# Patient Record
Sex: FEMALE | Race: WHITE | NOT HISPANIC OR LATINO | Employment: OTHER | URBAN - METROPOLITAN AREA
[De-identification: names, ages, dates, MRNs, and addresses within clinical notes are randomized per-mention and may not be internally consistent; named-entity substitution may affect disease eponyms.]

---

## 2017-01-04 ENCOUNTER — OFFICE VISIT (OUTPATIENT)
Dept: NEUROSURGERY | Facility: CLINIC | Age: 72
End: 2017-01-04

## 2017-01-04 VITALS
DIASTOLIC BLOOD PRESSURE: 60 MMHG | WEIGHT: 140 LBS | BODY MASS INDEX: 25.76 KG/M2 | HEIGHT: 62 IN | TEMPERATURE: 97.3 F | SYSTOLIC BLOOD PRESSURE: 144 MMHG

## 2017-01-04 DIAGNOSIS — M51.16 HERNIATION OF LUMBAR INTERVERTEBRAL DISC WITH RADICULOPATHY: Primary | ICD-10-CM

## 2017-01-04 PROCEDURE — 99024 POSTOP FOLLOW-UP VISIT: CPT | Performed by: PHYSICIAN ASSISTANT

## 2017-01-04 RX ORDER — ETODOLAC 600 MG/1
TABLET, EXTENDED RELEASE ORAL
Refills: 2 | COMMUNITY
Start: 2016-12-22 | End: 2017-07-08 | Stop reason: HOSPADM

## 2017-01-04 NOTE — PROGRESS NOTES
Patient: Paulina Johnson  : 1945  Chart #: [id number]    Date of Service: 2017    CHIEF COMPLAINT: right L4 5 disc herniation with radiculopathy and spinal stenosis    History of Present Illness Ms. Johnson is a kind 71-year-old female with a protracted history of severe back and predominantly right leg pain.  She had substantial walking and standing intolerance.  Her studies revealed a huge disc herniation at L4 5 with marked compromise of the spinal canal.  Given her ongoing symptoms, on 2016 she underwent right L4 5 laminotomy with medial facetectomy, foraminotomy and L4 5 discectomy.  Surgery was without overt intraoperative complication.    Today Ms. Johnson is 3 weeks postop.  She continues to have intermittent pain in the right leg.  She is more uncomfortable at nighttime.  She is using Aleve for pain control.  The severe pain she had prior to surgery is absent.  She has had no incisional difficulties.  She denies left-sided symptoms.    The following portions of the patient's history were reviewed and updated as appropriate: allergies, current medications, past family history, past medical history, past social history, past surgical history and problem list.    Review of Systems   Constitutional: Negative for activity change, appetite change, chills, diaphoresis, fatigue, fever and unexpected weight change.   HENT: Negative for congestion, dental problem, drooling, ear discharge, ear pain, facial swelling, hearing loss, mouth sores, nosebleeds, postnasal drip, rhinorrhea, sinus pressure, sneezing, sore throat, tinnitus, trouble swallowing and voice change.    Eyes: Negative for photophobia, pain, discharge, redness, itching and visual disturbance.   Respiratory: Negative for apnea, cough, choking, chest tightness, shortness of breath, wheezing and stridor.    Cardiovascular: Negative for chest pain, palpitations and leg swelling.   Gastrointestinal: Negative for abdominal distention,  "abdominal pain, anal bleeding, blood in stool, constipation, diarrhea, nausea, rectal pain and vomiting.   Endocrine: Negative for cold intolerance, heat intolerance, polydipsia, polyphagia and polyuria.   Genitourinary: Negative for decreased urine volume, difficulty urinating, dysuria, enuresis, flank pain, frequency, genital sores, hematuria and urgency.   Musculoskeletal: Positive for arthralgias, back pain, gait problem and myalgias. Negative for joint swelling, neck pain and neck stiffness.   Skin: Negative for color change, pallor, rash and wound.   Allergic/Immunologic: Negative for environmental allergies, food allergies and immunocompromised state.   Neurological: Positive for numbness. Negative for dizziness, tremors, seizures, syncope, facial asymmetry, speech difficulty, weakness, light-headedness and headaches.        Patient reports falling several times recently   Hematological: Negative for adenopathy. Does not bruise/bleed easily.   Psychiatric/Behavioral: Negative for agitation, behavioral problems, confusion, decreased concentration, dysphoric mood, hallucinations, self-injury, sleep disturbance and suicidal ideas. The patient is not nervous/anxious and is not hyperactive.        Objective   Vital Signs: Blood pressure 144/60, temperature 97.3 °F (36.3 °C), height 62\" (157.5 cm), weight 140 lb (63.5 kg).  Physical Exam   Constitutional: She appears well-developed and well-nourished.   Skin:   Examination of lumbar incision reveals incision to be intact.  There is some scabbing along the length of the incision.   No warmth erythema or induration. No swelling    Patient is ambulating more upright and without pain.   Nursing note and vitals reviewed.    Assessment/Plan   Medical Decision Making: Ms. Johnson is 3 weeks status post decompressive lumbar surgery and is making good progress.  She continues to have some mild, intermittant pain which I would expect to improve with some more time.  Her " incision is healing nicely and I discussed further wound care instructions.  She will be leaving for [place] next week and will not return until the weather is warm again in [place].  I encouraged her to get up and walk around frequently during her flight.  She will call us if she has any further issues.               Malika Fernando PA-C  Patient Care Team:  Hernan Thompson MD as PCP - General (Internal Medicine)  Hernan Thompson MD as Consulting Physician (Internal Medicine)

## 2017-01-04 NOTE — MR AVS SNAPSHOT
Paulina Johnson   1/4/2017 12:30 PM   Office Visit    Dept Phone:  [phone]   Encounter #:  [id number]    Provider:  Malika Fernando PA-C   Department:  [facility]                Your Full Care Plan              Your Updated Medication List          This list is accurate as of: 1/4/17  1:02 PM.  Always use your most recent med list.                aspirin 81 MG EC tablet       clopidogrel 75 MG tablet   Commonly known as:  PLAVIX   Take 1 tablet by mouth Daily. Resume in 1 week.       DULoxetine 60 MG capsule   Commonly known as:  CYMBALTA       etodolac  MG 24 hr tablet   Commonly known as:  LODINE XL       furosemide 40 MG tablet   Commonly known as:  LASIX       insulin aspart 100 UNIT/ML injection   Commonly known as:  novoLOG       insulin detemir 100 UNIT/ML injection   Commonly known as:  LEVEMIR       levothyroxine 50 MCG tablet   Commonly known as:  SYNTHROID, LEVOTHROID       metFORMIN 850 MG tablet   Commonly known as:  GLUCOPHAGE       oxyCODONE-acetaminophen 5-325 MG per tablet   Commonly known as:  PERCOCET   Take 1 tablet by mouth 3 (Three) Times a Day As Needed (as needed for moderate pain).       raloxifene 60 MG tablet   Commonly known as:  EVISTA       sertraline 50 MG tablet   Commonly known as:  ZOLOFT       simvastatin 20 MG tablet   Commonly known as:  ZOCOR       valsartan 160 MG tablet   Commonly known as:  DIOVAN               You Were Diagnosed With        Codes Comments    Herniation of lumbar intervertebral disc with radiculopathy    -  Primary ICD-10-CM: M51.16  ICD-9-CM: 722.10       Instructions     None    Patient Instructions History      Upcoming Appointments     Visit Type Date Time Department    POST-OP 1/4/2017 12:30 PM [facility]      MyChart Signup     [facility] allows you to send messages to your doctor, view your test results, renew your prescriptions, schedule  "appointments, and more. To sign up, go to [email or web address] and click on the Sign Up Now link in the New User? box. Enter your BTIG Activation Code exactly as it appears below along with the last four digits of your Social Security Number and your Date of Birth () to complete the sign-up process. If you do not sign up before the expiration date, you must request a new code.    BTIG Activation Code: [email or web address]  Expires: 2017  1:02 PM    If you have questions, you can email [email or web address] or call [phone] to talk to our BTIG staff. Remember, BTIG is NOT to be used for urgent needs. For medical emergencies, dial 911.               Other Info from Your Visit           Allergies     Azithromycin  Other (See Comments)    unsure    Ciprofloxacin  Other (See Comments)    unsure    Daypro [Oxaprozin]  Other (See Comments)    unsure    Lortab [Hydrocodone-acetaminophen]  Other (See Comments)    unsure    Penicillins  Other (See Comments)    unsure      Reason for Visit     Post-op           Vital Signs     Blood Pressure Temperature Height Weight Body Mass Index Smoking Status    144/60 97.3 °F (36.3 °C) 62\" (157.5 cm) 140 lb (63.5 kg) 25.61 kg/m2 Never Smoker      Problems and Diagnoses Noted     Herniation of lumbar intervertebral disc with radiculopathy    -  Primary        "

## 2017-04-07 ENCOUNTER — TELEPHONE (OUTPATIENT)
Dept: NEUROSURGERY | Facility: CLINIC | Age: 72
End: 2017-04-07

## 2017-04-07 RX ORDER — METHYLPREDNISOLONE 4 MG/1
TABLET ORAL
Qty: 1 EACH | Refills: 0 | Status: SHIPPED | OUTPATIENT
Start: 2017-04-07 | End: 2017-04-23

## 2017-04-07 NOTE — TELEPHONE ENCOUNTER
Provider:  Isra  Caller: patient  Time of call:  11:20   Phone #:  [phone]  Surgery:  Right lumbar discectomy  Surgery Date:  12-14-16  Last visit:   01/04/17  Next visit: None    BILL:         Reason for call:    Patient states that she has right hip pain that runs down her right lower extremity into the right foot.      When did it start: It has been going on all week.      Where is it located:right hip, right lower extremity and right foot.    How long has this been going on/is this new or the same as before surgery: She said it feels the same as before surgery.    Characteristics of symptom/severity: She describes the pain as stabbing.  She said she has to use her walker again.     Timing- Is it constant or intermittent: constant    What makes it worse: Being ambulatory.    What makes it better: Yanely knocks the edge off.    What therapies/medications have you tried: Aleve.  I told her to use some ice on her hip.

## 2017-04-07 NOTE — TELEPHONE ENCOUNTER
Called patient to discuss c/o right back pain x 5 days s/p Right L4/5 discectomy in Dec 2016.  States she was pain free for at least 3 weeks before resuming normal effort of housework and laundry.  She started having back pain on Sunday starting in her lower back and hip and radiating down the back of her leg to the bottom of the foot.  The pain is improved with aleve, laying down, and propping her leg on a pillow.  Walking makes the pain worse.  No falls since her surgery.  No apparent acute injuries related to the onset of her pain.      Recommended Ms. Johnson to take it easy this weekend and try to do as little housework as possible.  Advised her to avoid doing laundry over the weekend.  Advised her to monitor her sugars closely since will eRx Medro dosepak for the inflammation.  Advised her to hold the Aleve until after finishing the Medro dosepak.  Recommended ice packs for 20m q2h while awake.  Advised her to call on Monday if pain is not improved and we will schedule an appointment for her to be seen in clinic.

## 2017-04-10 ENCOUNTER — TELEPHONE (OUTPATIENT)
Dept: NEUROSURGERY | Facility: CLINIC | Age: 72
End: 2017-04-10

## 2017-04-10 NOTE — TELEPHONE ENCOUNTER
Called patient to f/up back pain over the weekend.  Last week she reported a progressive improvement of her pain s/p L4/5 discectomy in December. She most recently had a 3 week pain-free interval before developing sx of right back and hip pain after resuming normal effort of housework.  She reported the pain going from right back and hip going down back of leg to her foot.  The pain is improved with laying down and Aleve, provoked with walking.  Recommended ice therapy, rest, and a medro dosepak over the weekend.  I called her to see how her pain is today and she said she is still in pain.  I called Magdalena and she is calling patient to set up an appointment to see Dr. Dhaliwal tomorrow at 3:45.     KAH

## 2017-04-11 ENCOUNTER — OFFICE VISIT (OUTPATIENT)
Dept: NEUROSURGERY | Facility: CLINIC | Age: 72
End: 2017-04-11

## 2017-04-11 VITALS — BODY MASS INDEX: 25.03 KG/M2 | WEIGHT: 136 LBS | TEMPERATURE: 97.5 F | HEIGHT: 62 IN

## 2017-04-11 DIAGNOSIS — M51.16 HERNIATION OF LUMBAR INTERVERTEBRAL DISC WITH RADICULOPATHY: Primary | ICD-10-CM

## 2017-04-11 DIAGNOSIS — M48.062 LUMBAR STENOSIS WITH NEUROGENIC CLAUDICATION: ICD-10-CM

## 2017-04-11 PROCEDURE — 99213 OFFICE O/P EST LOW 20 MIN: CPT | Performed by: NEUROLOGICAL SURGERY

## 2017-04-11 RX ORDER — OXYCODONE HYDROCHLORIDE AND ACETAMINOPHEN 5; 325 MG/1; MG/1
1 TABLET ORAL 3 TIMES DAILY PRN
Qty: 50 TABLET | Refills: 0 | Status: ON HOLD | OUTPATIENT
Start: 2017-04-11 | End: 2017-04-25

## 2017-04-11 NOTE — PROGRESS NOTES
Patient: Paulina Johnson  : 1945    Primary Care Provider: Hernan Thompson MD    Requesting Provider: As above        History    Chief Complaint: Back and right leg pain.    History of Present Illness: Mr. Johnson is a 71-year-old woman who is well known to my service.  She presented with severe back and predominantly right leg pain.  She had significant walking and standing intolerance.  Her studies had revealed a very large disc herniation at L4-5 with marked compromise of the spinal canal.  On 2016 she underwent right L4-5 discectomy.  She was seen on one occasion in early January and was doing much better.  She went to [place] and was doing nicely.  She returned home about 2-1/2 weeks ago and then awoke a morning or 2 later with severe pain in her back right buttock.  The pain sometimes will extend all the way down to the foot.  She has no left-sided symptoms.  She denies bowel or bladder dysfunction.    Review of Systems   Constitutional: Negative for activity change, appetite change, chills, diaphoresis, fatigue, fever and unexpected weight change.   HENT: Negative for congestion, dental problem, drooling, ear discharge, ear pain, facial swelling, hearing loss, mouth sores, nosebleeds, postnasal drip, rhinorrhea, sinus pressure, sneezing, sore throat, tinnitus, trouble swallowing and voice change.    Eyes: Negative for photophobia, pain, discharge, redness, itching and visual disturbance.   Respiratory: Negative for apnea, cough, choking, chest tightness, shortness of breath, wheezing and stridor.    Cardiovascular: Negative for chest pain, palpitations and leg swelling.   Gastrointestinal: Negative for abdominal distention, abdominal pain, anal bleeding, blood in stool, constipation, diarrhea, nausea, rectal pain and vomiting.   Endocrine: Negative for cold intolerance, heat intolerance, polydipsia, polyphagia and polyuria.   Genitourinary: Negative for decreased urine volume,  "difficulty urinating, dysuria, enuresis, flank pain, frequency, genital sores, hematuria and urgency.   Musculoskeletal: Positive for back pain. Negative for arthralgias, gait problem, joint swelling, myalgias, neck pain and neck stiffness.   Skin: Negative for color change, pallor, rash and wound.   Allergic/Immunologic: Negative for environmental allergies, food allergies and immunocompromised state.   Neurological: Negative for dizziness, tremors, seizures, syncope, facial asymmetry, speech difficulty, weakness, light-headedness, numbness and headaches.   Hematological: Negative for adenopathy. Does not bruise/bleed easily.   Psychiatric/Behavioral: Negative for agitation, behavioral problems, confusion, decreased concentration, dysphoric mood, hallucinations, self-injury, sleep disturbance and suicidal ideas. The patient is not nervous/anxious and is not hyperactive.        The patient's past medical history, past surgical history, family history, and social history have been reviewed at length in the electronic medical record.    Physical Exam:   Temp 97.5 °F (36.4 °C)  Ht 62\" (157.5 cm)  Wt 136 lb (61.7 kg)  BMI 24.87 kg/m2  MUSCULOSKELETAL:  Straight leg raising is negative.  Edgar's Sign is negative.  ROM in back normal.  Tenderness in the back to palpation is not observed.  NEUROLOGICAL:  Strength is intact in the lower extremities to direct testing.  Muscle tone is normal throughout.  Station and gait are normal.  Sensation is intact to light touch testing throughout.  Deep tendon reflexes are 1+ and symmetrical.  Coordination is intact.      Medical Decision Making    Diagnosis:   Patient complaints the patient may harbor a recurrent radiculopathy.    Treatment Options:   I prescribed Percocet for pain.  I referred her for an MRI of the lumbar spine with and without gadolinium.  She will follow up thereafter.       Diagnosis Plan   1. Herniation of lumbar intervertebral disc with radiculopathy  " oxyCODONE-acetaminophen (PERCOCET) 5-325 MG per tablet   2. Lumbar stenosis with neurogenic claudication         Scribed for Vick Dhaliwal MD by Faith Garza CMA on 04/11/2017 at 4:45 PM    I, Dr. Dhaliwal, personally performed the services described in the documentation, as scribed in my presence, and it is both accurate and complete.

## 2017-04-19 ENCOUNTER — TELEPHONE (OUTPATIENT)
Dept: NEUROSURGERY | Facility: CLINIC | Age: 72
End: 2017-04-19

## 2017-04-19 ENCOUNTER — OFFICE VISIT (OUTPATIENT)
Dept: NEUROSURGERY | Facility: CLINIC | Age: 72
End: 2017-04-19

## 2017-04-19 ENCOUNTER — PREP FOR SURGERY (OUTPATIENT)
Dept: NEUROSURGERY | Facility: CLINIC | Age: 72
End: 2017-04-19

## 2017-04-19 ENCOUNTER — HOSPITAL ENCOUNTER (OUTPATIENT)
Dept: MRI IMAGING | Facility: HOSPITAL | Age: 72
Discharge: HOME | End: 2017-04-19
Attending: NEUROLOGICAL SURGERY | Admitting: NEUROLOGICAL SURGERY

## 2017-04-19 VITALS — HEIGHT: 62 IN | TEMPERATURE: 98.4 F | WEIGHT: 138 LBS | BODY MASS INDEX: 25.4 KG/M2

## 2017-04-19 DIAGNOSIS — M48.062 LUMBAR STENOSIS WITH NEUROGENIC CLAUDICATION: ICD-10-CM

## 2017-04-19 DIAGNOSIS — M51.26 RECURRENT HERNIATION OF LUMBAR DISC: Primary | ICD-10-CM

## 2017-04-19 DIAGNOSIS — M51.16 HERNIATION OF LUMBAR INTERVERTEBRAL DISC WITH RADICULOPATHY: ICD-10-CM

## 2017-04-19 DIAGNOSIS — M51.26 LUMBAR DISC HERNIATION: ICD-10-CM

## 2017-04-19 DIAGNOSIS — M51.26 RECURRENT DISPLACEMENT OF LUMBAR DISC: Primary | ICD-10-CM

## 2017-04-19 PROCEDURE — 99214 OFFICE O/P EST MOD 30 MIN: CPT | Performed by: NEUROLOGICAL SURGERY

## 2017-04-19 PROCEDURE — 72148 MRI LUMBAR SPINE W/O DYE: CPT

## 2017-04-19 RX ORDER — CHLORHEXIDINE GLUCONATE 4 G/100ML
SOLUTION TOPICAL
Qty: 120 ML | Refills: 0 | Status: SHIPPED | OUTPATIENT
Start: 2017-04-19 | End: 2017-04-25 | Stop reason: HOSPADM

## 2017-04-19 RX ORDER — SODIUM CHLORIDE 0.9 % (FLUSH) 0.9 %
1-10 SYRINGE (ML) INJECTION AS NEEDED
Status: CANCELLED | OUTPATIENT
Start: 2017-04-19

## 2017-04-19 RX ORDER — FAMOTIDINE 10 MG
20 TABLET ORAL
Status: CANCELLED | OUTPATIENT
Start: 2017-04-19

## 2017-04-19 NOTE — H&P
Patient: Paulina Johnson  : 1945     Primary Care Provider: Hernan Thompson MD     Requesting Provider: As above           History     Chief Complaint: Back and right leg pain.     History of Present Illness: Mr. Johnson is a 71-year-old woman who is well known to my service. She presented with severe back and predominantly right leg pain. She had significant walking and standing intolerance. Her studies had revealed a very large disc herniation at L4-5 with marked compromise of the spinal canal. On 2016 she underwent right L4-5 discectomy. She was seen on one occasion in early January and was doing much better. She went to [place] and was doing nicely. She returned home about 3-1/2 weeks ago and then awoke a morning or 2 later with severe pain in her back right buttock. The pain sometimes will extend all the way down to the foot. She has no left-sided symptoms. She denies bowel or bladder dysfunction. She continues to use a rolling walker to get around given her left pain. Her pain seems to be worse in the mornings.     Review of Systems   Constitutional: Negative for activity change, appetite change, chills, diaphoresis, fatigue, fever and unexpected weight change.   HENT: Negative for congestion, dental problem, drooling, ear discharge, ear pain, facial swelling, hearing loss, mouth sores, nosebleeds, postnasal drip, rhinorrhea, sinus pressure, sneezing, sore throat, tinnitus, trouble swallowing and voice change.   Eyes: Negative for photophobia, pain, discharge, redness, itching and visual disturbance.   Respiratory: Negative for apnea, cough, choking, chest tightness, shortness of breath, wheezing and stridor.   Cardiovascular: Negative for chest pain, palpitations and leg swelling.   Gastrointestinal: Negative for abdominal distention, abdominal pain, anal bleeding, blood in stool, constipation, diarrhea, nausea, rectal pain and vomiting.   Endocrine: Negative for cold intolerance,  "heat intolerance, polydipsia, polyphagia and polyuria.   Genitourinary: Negative for decreased urine volume, difficulty urinating, dysuria, enuresis, flank pain, frequency, genital sores, hematuria and urgency.   Musculoskeletal: Positive for back pain. Negative for arthralgias, gait problem, joint swelling, myalgias, neck pain and neck stiffness.   Skin: Negative for color change, pallor, rash and wound.   Allergic/Immunologic: Negative for environmental allergies, food allergies and immunocompromised state.   Neurological: Negative for dizziness, tremors, seizures, syncope, facial asymmetry, speech difficulty, weakness, light-headedness, numbness and headaches.   Hematological: Negative for adenopathy. Does not bruise/bleed easily.   Psychiatric/Behavioral: Negative for agitation, behavioral problems, confusion, decreased concentration, dysphoric mood, hallucinations, self-injury, sleep disturbance and suicidal ideas. The patient is not nervous/anxious and is not hyperactive.         The patient's past medical history, past surgical history, family history, and social history have been reviewed at length in the electronic medical record.     Past Medical History:   Diagnosis Date   • Arthritis    • Back pain    • Depression    • Diabetes mellitus     checks sugar 3 times per day    • History of stroke     2015   • Hyperlipidemia    • Hypertension    • Skin cancer     generalized areas multiple areas   • Wears glasses     \"driving\"     Past Surgical History:   Procedure Laterality Date   • ANTERIOR CERVICAL DISCECTOMY  12/18/2003    ACD w/ allografting and plating C5-7 (Dr. Dhaliwal)   • COLONOSCOPY      x5   • EYE SURGERY      cataracts bilat with lens    • HEMORRHOIDECTOMY     • KIDNEY SURGERY     • LUMBAR DISCECTOMY Right 12/14/2016    Procedure: RIGHT LUMBAR DISCECTOMY L4-5;  Surgeon: Vick Dhaliwal MD;  Location: [facility] beds at the [facility];  Service:    • REPLACEMENT TOTAL KNEE Bilateral    • TIBIA FRACTURE SURGERY      right  " "    No family history on file.  Social History     Social History   • Marital status:      Spouse name: N/A   • Number of children: N/A   • Years of education: N/A     Occupational History   • Not on file.     Social History Main Topics   • Smoking status: Never Smoker   • Smokeless tobacco: Never Used   • Alcohol use No   • Drug use: No   • Sexual activity: Not on file     Other Topics Concern   • Not on file     Social History Narrative     Allergies   Allergen Reactions   • Azithromycin Other (See Comments)     unsure   • Ciprofloxacin Other (See Comments)     unsure   • Daypro [Oxaprozin] Other (See Comments)     unsure   • Lortab [Hydrocodone-Acetaminophen] Other (See Comments)     unsure   • Penicillins Other (See Comments)     unsure       Physical Exam:   Temp 98.4 °F (36.9 °C)  Ht 62\" (157.5 cm)  Wt 138 lb (62.6 kg)  BMI 25.24 kg/m2  MUSCULOSKELETAL:  Straight leg raising is negative.  Edgar's Sign is negative.  ROM in back normal.  Tenderness in the back to palpation is not observed.  NEUROLOGICAL:  Strength is intact in the lower extremities to direct testing.  Muscle tone is normal throughout.  Station and gait are normal.  Sensation is intact to light touch testing throughout.        Medical Decision Making     Data Review:   Lumbar MRI from today reveals a large recurrent disc extrusion on the right at L4-5 that extends into the recess and even into the proximal foramen.     Diagnosis:   Recurrent right L4-5 disc herniation with severe radiculopathy.      Treatment Options:   Given her level of pain I recommended redo right L4-5 discectomy. This will likely improve her symptoms substantially although it may not completely eradicate them. The nature of the procedure as well as the potential risks, complications, limitations, and alternatives to the procedure were discussed at length with the patient and the patient has agreed to proceed with surgery.          Diagnosis Plan   1. Recurrent " displacement of lumbar disc      2. Herniation of lumbar intervertebral disc with radiculopathy      3. Lumbar stenosis with neurogenic claudication      4. Lumbar disc herniation

## 2017-04-19 NOTE — PROGRESS NOTES
Patient: Paulina Johnson  : 1945    Primary Care Provider: Hernan Thompson MD    Requesting Provider: As above        History    Chief Complaint: Back and right leg pain.    History of Present Illness: Mr. Johnson is a 71-year-old woman who is well known to my service. She presented with severe back and predominantly right leg pain. She had significant walking and standing intolerance. Her studies had revealed a very large disc herniation at L4-5 with marked compromise of the spinal canal. On 2016 she underwent right L4-5 discectomy. She was seen on one occasion in early January and was doing much better. She went to [place] and was doing nicely. She returned home about 3-1/2 weeks ago and then awoke a morning or 2 later with severe pain in her back right buttock. The pain sometimes will extend all the way down to the foot. She has no left-sided symptoms. She denies bowel or bladder dysfunction.  She continues to use a rolling walker to get around given her left pain.  Her pain seems to be worse in the mornings.    Review of Systems   Constitutional: Negative for activity change, appetite change, chills, diaphoresis, fatigue, fever and unexpected weight change.   HENT: Negative for congestion, dental problem, drooling, ear discharge, ear pain, facial swelling, hearing loss, mouth sores, nosebleeds, postnasal drip, rhinorrhea, sinus pressure, sneezing, sore throat, tinnitus, trouble swallowing and voice change.    Eyes: Negative for photophobia, pain, discharge, redness, itching and visual disturbance.   Respiratory: Negative for apnea, cough, choking, chest tightness, shortness of breath, wheezing and stridor.    Cardiovascular: Negative for chest pain, palpitations and leg swelling.   Gastrointestinal: Negative for abdominal distention, abdominal pain, anal bleeding, blood in stool, constipation, diarrhea, nausea, rectal pain and vomiting.   Endocrine: Negative for cold intolerance, heat  "intolerance, polydipsia, polyphagia and polyuria.   Genitourinary: Negative for decreased urine volume, difficulty urinating, dysuria, enuresis, flank pain, frequency, genital sores, hematuria and urgency.   Musculoskeletal: Positive for back pain. Negative for arthralgias, gait problem, joint swelling, myalgias, neck pain and neck stiffness.   Skin: Negative for color change, pallor, rash and wound.   Allergic/Immunologic: Negative for environmental allergies, food allergies and immunocompromised state.   Neurological: Negative for dizziness, tremors, seizures, syncope, facial asymmetry, speech difficulty, weakness, light-headedness, numbness and headaches.   Hematological: Negative for adenopathy. Does not bruise/bleed easily.   Psychiatric/Behavioral: Negative for agitation, behavioral problems, confusion, decreased concentration, dysphoric mood, hallucinations, self-injury, sleep disturbance and suicidal ideas. The patient is not nervous/anxious and is not hyperactive.        The patient's past medical history, past surgical history, family history, and social history have been reviewed at length in the electronic medical record.    Physical Exam:   Temp 98.4 °F (36.9 °C)  Ht 62\" (157.5 cm)  Wt 138 lb (62.6 kg)  BMI 25.24 kg/m2  MUSCULOSKELETAL:  Straight leg raising is negative.  Edgar's Sign is negative.  ROM in back normal.  Tenderness in the back to palpation is not observed.  NEUROLOGICAL:  Strength is intact in the lower extremities to direct testing.  Muscle tone is normal throughout.  Station and gait are normal.  Sensation is intact to light touch testing throughout.      Medical Decision Making    Data Review:   Lumbar MRI from today reveals a large recurrent disc extrusion on the right at L4-5 that extends into the recess and even into the proximal foramen.    Diagnosis:   Recurrent right L4-5 disc herniation with severe radiculopathy.     Treatment Options:   Given her level of pain I " recommended redo right L4-5 discectomy.  This will likely improve her symptoms substantially although it may not completely eradicate them.  The nature of the procedure as well as the potential risks, complications, limitations, and alternatives to the procedure were discussed at length with the patient and the patient has agreed to proceed with surgery.       Diagnosis Plan   1. Recurrent displacement of lumbar disc     2. Herniation of lumbar intervertebral disc with radiculopathy     3. Lumbar stenosis with neurogenic claudication     4. Lumbar disc herniation         Scribed for Vick Dhaliwal MD by Faith Garza CMA on 04/19/2017 at 2:04 PM    I, Dr. Dhaliwal, personally performed the services described in the documentation, as scribed in my presence, and it is both accurate and complete.

## 2017-04-19 NOTE — TELEPHONE ENCOUNTER
Provider:  Isra  Caller: Mehnaz  Time of call:     Phone #:  [phone]. [phone]  Surgery:  L4-5 Discectomy  Surgery Date:  12/14/17  Last visit:   4/11/17  Next visit: Today    BILL:         Reason for call:       Pt MRI was changed to no contrast per Dr. Cates due to CFR level of 27 and Creatine level of 1.8, wanted to make us aware

## 2017-04-23 ENCOUNTER — APPOINTMENT (OUTPATIENT)
Dept: PREADMISSION TESTING | Facility: HOSPITAL | Age: 72
End: 2017-04-23

## 2017-04-23 VITALS — BODY MASS INDEX: 25.96 KG/M2 | WEIGHT: 141.09 LBS | HEIGHT: 62 IN

## 2017-04-23 LAB
DEPRECATED RDW RBC AUTO: 48.2 FL (ref 37–54)
ERYTHROCYTE [DISTWIDTH] IN BLOOD BY AUTOMATED COUNT: 15.4 % (ref 11.3–14.5)
HCT VFR BLD AUTO: 30.2 % (ref 34.5–44)
HGB BLD-MCNC: 9.4 G/DL (ref 11.5–15.5)
MCH RBC QN AUTO: 26.7 PG (ref 27–31)
MCHC RBC AUTO-ENTMCNC: 31.1 G/DL (ref 32–36)
MCV RBC AUTO: 85.8 FL (ref 80–99)
MRSA DNA SPEC QL NAA+PROBE: NEGATIVE
PLATELET # BLD AUTO: 433 10*3/MM3 (ref 150–450)
PMV BLD AUTO: 8.7 FL (ref 6–12)
POTASSIUM BLD-SCNC: 4.6 MMOL/L (ref 3.5–5.5)
RBC # BLD AUTO: 3.52 10*6/MM3 (ref 3.89–5.14)
WBC NRBC COR # BLD: 7.23 10*3/MM3 (ref 3.5–10.8)

## 2017-04-23 PROCEDURE — 84132 ASSAY OF SERUM POTASSIUM: CPT

## 2017-04-23 PROCEDURE — 85027 COMPLETE CBC AUTOMATED: CPT

## 2017-04-23 PROCEDURE — 87641 MR-STAPH DNA AMP PROBE: CPT

## 2017-04-23 PROCEDURE — 36415 COLL VENOUS BLD VENIPUNCTURE: CPT

## 2017-04-24 ENCOUNTER — ANESTHESIA EVENT (OUTPATIENT)
Dept: PERIOP | Facility: HOSPITAL | Age: 72
End: 2017-04-24

## 2017-04-24 ENCOUNTER — APPOINTMENT (OUTPATIENT)
Dept: GENERAL RADIOLOGY | Facility: HOSPITAL | Age: 72
End: 2017-04-24

## 2017-04-24 ENCOUNTER — ANESTHESIA (OUTPATIENT)
Dept: PERIOP | Facility: HOSPITAL | Age: 72
End: 2017-04-24

## 2017-04-24 ENCOUNTER — HOSPITAL ENCOUNTER (OUTPATIENT)
Facility: HOSPITAL | Age: 72
Discharge: HOME | End: 2017-04-25
Attending: NEUROLOGICAL SURGERY | Admitting: NEUROLOGICAL SURGERY

## 2017-04-24 DIAGNOSIS — M51.26 RECURRENT HERNIATION OF LUMBAR DISC: ICD-10-CM

## 2017-04-24 DIAGNOSIS — M51.16 HERNIATION OF LUMBAR INTERVERTEBRAL DISC WITH RADICULOPATHY: ICD-10-CM

## 2017-04-24 LAB
BASE EXCESS BLDA CALC-SCNC: 0 MMOL/L (ref 0–2)
CA-I BLDA-SCNC: 1.18 MMOL/L (ref 1.12–1.3)
CHLORIDE BLDA-SCNC: 105 MMOL/L (ref 98–105)
CO2 BLDA-SCNC: 23.6 MMOL/L (ref 23–27)
GLUCOSE BLDA-MCNC: 275 MG/DL (ref 67–93)
GLUCOSE BLDC GLUCOMTR-MCNC: 129 MG/DL (ref 70–130)
GLUCOSE BLDC GLUCOMTR-MCNC: 195 MG/DL (ref 70–130)
GLUCOSE BLDC GLUCOMTR-MCNC: 270 MG/DL (ref 70–130)
GLUCOSE BLDC GLUCOMTR-MCNC: 288 MG/DL (ref 70–130)
GLUCOSE BLDC GLUCOMTR-MCNC: 306 MG/DL (ref 70–130)
GLUCOSE BLDC GLUCOMTR-MCNC: 400 MG/DL (ref 70–130)
GLUCOSE BLDC GLUCOMTR-MCNC: 469 MG/DL (ref 70–130)
HCO3 BLDA-SCNC: 22.6 MMOL/L (ref 20–26)
HCT VFR BLDA CALC: 27 % (ref 39–51)
HGB BLDA-MCNC: 9.3 G/DL (ref 10–16)
PCO2 BLDA: 29.5 MM HG (ref 34–46)
PH BLDA: 7.5 PH UNITS (ref 7.34–7.46)
PO2 BLDA: 198.4 MMHG (ref 79–99)
POTASSIUM BLDA-SCNC: 4.72 MMOL/L (ref 3.5–5.3)
SAO2 % BLD: 98.8 % (ref 92–98)
SODIUM BLDA-SCNC: 134.8 MMOL/L (ref 134–146)

## 2017-04-24 PROCEDURE — 85018 HEMOGLOBIN: CPT | Performed by: NEUROLOGICAL SURGERY

## 2017-04-24 PROCEDURE — 63710000001 INSULIN LISPRO (HUMAN) PER 5 UNITS: Performed by: NURSE ANESTHETIST, CERTIFIED REGISTERED

## 2017-04-24 PROCEDURE — 82962 GLUCOSE BLOOD TEST: CPT

## 2017-04-24 PROCEDURE — 25010000002 FENTANYL CITRATE (PF) 100 MCG/2ML SOLUTION: Performed by: NURSE ANESTHETIST, CERTIFIED REGISTERED

## 2017-04-24 PROCEDURE — 63042 LAMINOTOMY SINGLE LUMBAR: CPT | Performed by: NEUROLOGICAL SURGERY

## 2017-04-24 PROCEDURE — 82330 ASSAY OF CALCIUM: CPT | Performed by: NEUROLOGICAL SURGERY

## 2017-04-24 PROCEDURE — 82805 BLOOD GASES W/O2 SATURATION: CPT | Performed by: NEUROLOGICAL SURGERY

## 2017-04-24 PROCEDURE — 84295 ASSAY OF SERUM SODIUM: CPT | Performed by: NEUROLOGICAL SURGERY

## 2017-04-24 PROCEDURE — 25010000002 ONDANSETRON PER 1 MG: Performed by: NURSE ANESTHETIST, CERTIFIED REGISTERED

## 2017-04-24 PROCEDURE — A9270 NON-COVERED ITEM OR SERVICE: HCPCS | Performed by: ANESTHESIOLOGY

## 2017-04-24 PROCEDURE — 25010000002 PHENYLEPHRINE PER 1 ML: Performed by: NURSE ANESTHETIST, CERTIFIED REGISTERED

## 2017-04-24 PROCEDURE — 63710000001 INSULIN REGULAR HUMAN PER 5 UNITS: Performed by: ANESTHESIOLOGY

## 2017-04-24 PROCEDURE — 25010000002 NEOSTIGMINE PER 0.5 MG: Performed by: NURSE ANESTHETIST, CERTIFIED REGISTERED

## 2017-04-24 PROCEDURE — 25010000002 FENTANYL CITRATE (PF) 100 MCG/2ML SOLUTION: Performed by: ANESTHESIOLOGY

## 2017-04-24 PROCEDURE — 82435 ASSAY OF BLOOD CHLORIDE: CPT | Performed by: NEUROLOGICAL SURGERY

## 2017-04-24 PROCEDURE — 25010000002 ONDANSETRON PER 1 MG: Performed by: NEUROLOGICAL SURGERY

## 2017-04-24 PROCEDURE — 25010000002 PROPOFOL 10 MG/ML EMULSION: Performed by: NURSE ANESTHETIST, CERTIFIED REGISTERED

## 2017-04-24 PROCEDURE — 63710000001 FAMOTIDINE 20 MG TABLET: Performed by: ANESTHESIOLOGY

## 2017-04-24 PROCEDURE — 25010000002 VANCOMYCIN PER 500 MG: Performed by: NEUROLOGICAL SURGERY

## 2017-04-24 PROCEDURE — 84132 ASSAY OF SERUM POTASSIUM: CPT | Performed by: NEUROLOGICAL SURGERY

## 2017-04-24 PROCEDURE — G0378 HOSPITAL OBSERVATION PER HR: HCPCS

## 2017-04-24 PROCEDURE — 63710000001 INSULIN REGULAR HUMAN PER 5 UNITS: Performed by: NEUROLOGICAL SURGERY

## 2017-04-24 PROCEDURE — A9270 NON-COVERED ITEM OR SERVICE: HCPCS | Performed by: NURSE ANESTHETIST, CERTIFIED REGISTERED

## 2017-04-24 PROCEDURE — 76000 FLUOROSCOPY <1 HR PHYS/QHP: CPT

## 2017-04-24 RX ORDER — SODIUM CHLORIDE 0.9 % (FLUSH) 0.9 %
1-10 SYRINGE (ML) INJECTION AS NEEDED
Status: DISCONTINUED | OUTPATIENT
Start: 2017-04-24 | End: 2017-04-25 | Stop reason: HOSPADM

## 2017-04-24 RX ORDER — DIPHENHYDRAMINE HCL 25 MG
25 CAPSULE ORAL NIGHTLY PRN
Status: DISCONTINUED | OUTPATIENT
Start: 2017-04-24 | End: 2017-04-25 | Stop reason: HOSPADM

## 2017-04-24 RX ORDER — ATORVASTATIN CALCIUM 10 MG/1
2.5 TABLET, FILM COATED ORAL DAILY
Status: DISCONTINUED | OUTPATIENT
Start: 2017-04-24 | End: 2017-04-25 | Stop reason: HOSPADM

## 2017-04-24 RX ORDER — VALSARTAN 160 MG/1
160 TABLET ORAL DAILY
Status: DISCONTINUED | OUTPATIENT
Start: 2017-04-24 | End: 2017-04-25 | Stop reason: HOSPADM

## 2017-04-24 RX ORDER — NALOXONE HCL 0.4 MG/ML
0.4 VIAL (ML) INJECTION
Status: DISCONTINUED | OUTPATIENT
Start: 2017-04-24 | End: 2017-04-25 | Stop reason: HOSPADM

## 2017-04-24 RX ORDER — FUROSEMIDE 40 MG/1
40 TABLET ORAL DAILY
Status: DISCONTINUED | OUTPATIENT
Start: 2017-04-24 | End: 2017-04-25 | Stop reason: HOSPADM

## 2017-04-24 RX ORDER — FENTANYL CITRATE 50 UG/ML
50 INJECTION, SOLUTION INTRAMUSCULAR; INTRAVENOUS
Status: DISCONTINUED | OUTPATIENT
Start: 2017-04-24 | End: 2017-04-24 | Stop reason: HOSPADM

## 2017-04-24 RX ORDER — MAGNESIUM HYDROXIDE 1200 MG/15ML
LIQUID ORAL AS NEEDED
Status: DISCONTINUED | OUTPATIENT
Start: 2017-04-24 | End: 2017-04-24 | Stop reason: HOSPADM

## 2017-04-24 RX ORDER — FAMOTIDINE 10 MG/ML
20 INJECTION, SOLUTION INTRAVENOUS 2 TIMES DAILY
Status: DISCONTINUED | OUTPATIENT
Start: 2017-04-24 | End: 2017-04-25 | Stop reason: HOSPADM

## 2017-04-24 RX ORDER — ATRACURIUM BESYLATE 10 MG/ML
INJECTION, SOLUTION INTRAVENOUS AS NEEDED
Status: DISCONTINUED | OUTPATIENT
Start: 2017-04-24 | End: 2017-04-24 | Stop reason: SURG

## 2017-04-24 RX ORDER — FAMOTIDINE 20 MG/1
20 TABLET, FILM COATED ORAL
Status: DISCONTINUED | OUTPATIENT
Start: 2017-04-24 | End: 2017-04-24

## 2017-04-24 RX ORDER — FAMOTIDINE 10 MG/ML
20 INJECTION, SOLUTION INTRAVENOUS
Status: DISCONTINUED | OUTPATIENT
Start: 2017-04-24 | End: 2017-04-24

## 2017-04-24 RX ORDER — PROPOFOL 10 MG/ML
VIAL (ML) INTRAVENOUS AS NEEDED
Status: DISCONTINUED | OUTPATIENT
Start: 2017-04-24 | End: 2017-04-24 | Stop reason: SURG

## 2017-04-24 RX ORDER — LEVOTHYROXINE SODIUM 0.05 MG/1
50 TABLET ORAL DAILY
Status: DISCONTINUED | OUTPATIENT
Start: 2017-04-24 | End: 2017-04-25 | Stop reason: HOSPADM

## 2017-04-24 RX ORDER — ONDANSETRON 2 MG/ML
4 INJECTION INTRAMUSCULAR; INTRAVENOUS EVERY 6 HOURS PRN
Status: DISCONTINUED | OUTPATIENT
Start: 2017-04-24 | End: 2017-04-25 | Stop reason: HOSPADM

## 2017-04-24 RX ORDER — BISACODYL 5 MG/1
5 TABLET, DELAYED RELEASE ORAL DAILY PRN
Status: DISCONTINUED | OUTPATIENT
Start: 2017-04-24 | End: 2017-04-25 | Stop reason: HOSPADM

## 2017-04-24 RX ORDER — DULOXETIN HYDROCHLORIDE 30 MG/1
30 CAPSULE, DELAYED RELEASE ORAL DAILY
Status: DISCONTINUED | OUTPATIENT
Start: 2017-04-24 | End: 2017-04-25 | Stop reason: HOSPADM

## 2017-04-24 RX ORDER — SODIUM CHLORIDE 9 MG/ML
INJECTION, SOLUTION INTRAVENOUS CONTINUOUS PRN
Status: DISCONTINUED | OUTPATIENT
Start: 2017-04-24 | End: 2017-04-24 | Stop reason: SURG

## 2017-04-24 RX ORDER — ONDANSETRON 2 MG/ML
INJECTION INTRAMUSCULAR; INTRAVENOUS AS NEEDED
Status: DISCONTINUED | OUTPATIENT
Start: 2017-04-24 | End: 2017-04-24 | Stop reason: SURG

## 2017-04-24 RX ORDER — MORPHINE SULFATE 10 MG/ML
4 INJECTION INTRAMUSCULAR; INTRAVENOUS; SUBCUTANEOUS EVERY 4 HOURS PRN
Status: DISCONTINUED | OUTPATIENT
Start: 2017-04-24 | End: 2017-04-25 | Stop reason: HOSPADM

## 2017-04-24 RX ORDER — DOCUSATE SODIUM 100 MG/1
100 CAPSULE, LIQUID FILLED ORAL 2 TIMES DAILY PRN
Status: DISCONTINUED | OUTPATIENT
Start: 2017-04-24 | End: 2017-04-25 | Stop reason: HOSPADM

## 2017-04-24 RX ORDER — BUPIVACAINE HYDROCHLORIDE AND EPINEPHRINE 2.5; 5 MG/ML; UG/ML
INJECTION, SOLUTION EPIDURAL; INFILTRATION; INTRACAUDAL; PERINEURAL AS NEEDED
Status: DISCONTINUED | OUTPATIENT
Start: 2017-04-24 | End: 2017-04-24 | Stop reason: HOSPADM

## 2017-04-24 RX ORDER — ACETAMINOPHEN 325 MG/1
650 TABLET ORAL EVERY 4 HOURS PRN
Status: DISCONTINUED | OUTPATIENT
Start: 2017-04-24 | End: 2017-04-25 | Stop reason: HOSPADM

## 2017-04-24 RX ORDER — GLYCOPYRROLATE 0.2 MG/ML
INJECTION INTRAMUSCULAR; INTRAVENOUS AS NEEDED
Status: DISCONTINUED | OUTPATIENT
Start: 2017-04-24 | End: 2017-04-24 | Stop reason: SURG

## 2017-04-24 RX ORDER — SODIUM CHLORIDE 0.9 % (FLUSH) 0.9 %
1-10 SYRINGE (ML) INJECTION AS NEEDED
Status: DISCONTINUED | OUTPATIENT
Start: 2017-04-24 | End: 2017-04-24

## 2017-04-24 RX ORDER — FENTANYL CITRATE 50 UG/ML
25 INJECTION, SOLUTION INTRAMUSCULAR; INTRAVENOUS
Status: DISCONTINUED | OUTPATIENT
Start: 2017-04-24 | End: 2017-04-24 | Stop reason: HOSPADM

## 2017-04-24 RX ORDER — NICOTINE POLACRILEX 4 MG
15 LOZENGE BUCCAL
Status: DISCONTINUED | OUTPATIENT
Start: 2017-04-24 | End: 2017-04-25 | Stop reason: HOSPADM

## 2017-04-24 RX ORDER — OXYCODONE HYDROCHLORIDE AND ACETAMINOPHEN 5; 325 MG/1; MG/1
1 TABLET ORAL 3 TIMES DAILY PRN
Status: DISCONTINUED | OUTPATIENT
Start: 2017-04-24 | End: 2017-04-25 | Stop reason: HOSPADM

## 2017-04-24 RX ORDER — BISACODYL 10 MG
10 SUPPOSITORY, RECTAL RECTAL DAILY PRN
Status: DISCONTINUED | OUTPATIENT
Start: 2017-04-24 | End: 2017-04-25 | Stop reason: HOSPADM

## 2017-04-24 RX ORDER — SODIUM CHLORIDE, SODIUM LACTATE, POTASSIUM CHLORIDE, CALCIUM CHLORIDE 600; 310; 30; 20 MG/100ML; MG/100ML; MG/100ML; MG/100ML
75 INJECTION, SOLUTION INTRAVENOUS CONTINUOUS
Status: DISCONTINUED | OUTPATIENT
Start: 2017-04-24 | End: 2017-04-25

## 2017-04-24 RX ORDER — FAMOTIDINE 20 MG/1
20 TABLET, FILM COATED ORAL 2 TIMES DAILY
Status: DISCONTINUED | OUTPATIENT
Start: 2017-04-24 | End: 2017-04-25 | Stop reason: HOSPADM

## 2017-04-24 RX ORDER — LIDOCAINE HYDROCHLORIDE 10 MG/ML
INJECTION, SOLUTION INFILTRATION; PERINEURAL AS NEEDED
Status: DISCONTINUED | OUTPATIENT
Start: 2017-04-24 | End: 2017-04-24 | Stop reason: SURG

## 2017-04-24 RX ORDER — ASPIRIN 81 MG/1
81 TABLET ORAL DAILY
Status: DISCONTINUED | OUTPATIENT
Start: 2017-04-24 | End: 2017-04-25 | Stop reason: HOSPADM

## 2017-04-24 RX ORDER — SODIUM CHLORIDE, SODIUM LACTATE, POTASSIUM CHLORIDE, CALCIUM CHLORIDE 600; 310; 30; 20 MG/100ML; MG/100ML; MG/100ML; MG/100ML
9 INJECTION, SOLUTION INTRAVENOUS CONTINUOUS PRN
Status: DISCONTINUED | OUTPATIENT
Start: 2017-04-24 | End: 2017-04-24 | Stop reason: HOSPADM

## 2017-04-24 RX ORDER — SODIUM CHLORIDE, SODIUM LACTATE, POTASSIUM CHLORIDE, CALCIUM CHLORIDE 600; 310; 30; 20 MG/100ML; MG/100ML; MG/100ML; MG/100ML
9 INJECTION, SOLUTION INTRAVENOUS CONTINUOUS PRN
Status: DISCONTINUED | OUTPATIENT
Start: 2017-04-24 | End: 2017-04-24

## 2017-04-24 RX ORDER — DEXTROSE MONOHYDRATE 25 G/50ML
25 INJECTION, SOLUTION INTRAVENOUS
Status: DISCONTINUED | OUTPATIENT
Start: 2017-04-24 | End: 2017-04-25 | Stop reason: HOSPADM

## 2017-04-24 RX ORDER — SODIUM CHLORIDE 0.9 % (FLUSH) 0.9 %
1-10 SYRINGE (ML) INJECTION AS NEEDED
Status: DISCONTINUED | OUTPATIENT
Start: 2017-04-24 | End: 2017-04-24 | Stop reason: HOSPADM

## 2017-04-24 RX ORDER — LIDOCAINE HYDROCHLORIDE 10 MG/ML
0.5 INJECTION, SOLUTION EPIDURAL; INFILTRATION; INTRACAUDAL; PERINEURAL ONCE AS NEEDED
Status: DISCONTINUED | OUTPATIENT
Start: 2017-04-24 | End: 2017-04-24

## 2017-04-24 RX ORDER — VANCOMYCIN HYDROCHLORIDE 1 G/200ML
15 INJECTION, SOLUTION INTRAVENOUS EVERY 12 HOURS
Status: COMPLETED | OUTPATIENT
Start: 2017-04-24 | End: 2017-04-24

## 2017-04-24 RX ORDER — VANCOMYCIN HYDROCHLORIDE 1 G/200ML
15 INJECTION, SOLUTION INTRAVENOUS ONCE
Status: COMPLETED | OUTPATIENT
Start: 2017-04-24 | End: 2017-04-24

## 2017-04-24 RX ORDER — SENNA AND DOCUSATE SODIUM 50; 8.6 MG/1; MG/1
1 TABLET, FILM COATED ORAL NIGHTLY PRN
Status: DISCONTINUED | OUTPATIENT
Start: 2017-04-24 | End: 2017-04-25 | Stop reason: HOSPADM

## 2017-04-24 RX ORDER — FENTANYL CITRATE 50 UG/ML
INJECTION, SOLUTION INTRAMUSCULAR; INTRAVENOUS AS NEEDED
Status: DISCONTINUED | OUTPATIENT
Start: 2017-04-24 | End: 2017-04-24 | Stop reason: SURG

## 2017-04-24 RX ORDER — LIDOCAINE HYDROCHLORIDE 10 MG/ML
0.5 INJECTION, SOLUTION EPIDURAL; INFILTRATION; INTRACAUDAL; PERINEURAL ONCE AS NEEDED
Status: COMPLETED | OUTPATIENT
Start: 2017-04-24 | End: 2017-04-24

## 2017-04-24 RX ADMIN — ATRACURIUM BESYLATE 10 MG: 10 INJECTION, SOLUTION INTRAVENOUS at 11:25

## 2017-04-24 RX ADMIN — ONDANSETRON 4 MG: 2 INJECTION INTRAMUSCULAR; INTRAVENOUS at 11:42

## 2017-04-24 RX ADMIN — LIDOCAINE HYDROCHLORIDE 0.2 ML: 10 INJECTION, SOLUTION EPIDURAL; INFILTRATION; INTRACAUDAL; PERINEURAL at 08:13

## 2017-04-24 RX ADMIN — INSULIN HUMAN 5 UNITS: 100 INJECTION, SOLUTION PARENTERAL at 09:11

## 2017-04-24 RX ADMIN — Medication 3 MG: at 11:45

## 2017-04-24 RX ADMIN — METFORMIN HYDROCHLORIDE 850 MG: 850 TABLET, FILM COATED ORAL at 15:07

## 2017-04-24 RX ADMIN — SODIUM CHLORIDE, POTASSIUM CHLORIDE, SODIUM LACTATE AND CALCIUM CHLORIDE 9 ML/HR: 600; 310; 30; 20 INJECTION, SOLUTION INTRAVENOUS at 08:13

## 2017-04-24 RX ADMIN — OXYCODONE AND ACETAMINOPHEN 1 TABLET: 5; 325 TABLET ORAL at 21:42

## 2017-04-24 RX ADMIN — FAMOTIDINE 20 MG: 20 TABLET ORAL at 18:01

## 2017-04-24 RX ADMIN — OXYCODONE AND ACETAMINOPHEN 1 TABLET: 5; 325 TABLET ORAL at 15:00

## 2017-04-24 RX ADMIN — SODIUM CHLORIDE: 9 INJECTION, SOLUTION INTRAVENOUS at 11:42

## 2017-04-24 RX ADMIN — VANCOMYCIN HYDROCHLORIDE 1000 MG: 1 INJECTION, SOLUTION INTRAVENOUS at 21:41

## 2017-04-24 RX ADMIN — INSULIN HUMAN 10 UNITS: 100 INJECTION, SOLUTION PARENTERAL at 08:28

## 2017-04-24 RX ADMIN — PHENYLEPHRINE HYDROCHLORIDE 50 MCG: 10 INJECTION INTRAVENOUS at 11:03

## 2017-04-24 RX ADMIN — INSULIN HUMAN 8 UNITS: 100 INJECTION, SOLUTION PARENTERAL at 16:17

## 2017-04-24 RX ADMIN — ATRACURIUM BESYLATE 30 MG: 10 INJECTION, SOLUTION INTRAVENOUS at 10:51

## 2017-04-24 RX ADMIN — VANCOMYCIN HYDROCHLORIDE 1000 MG: 1 INJECTION, SOLUTION INTRAVENOUS at 10:13

## 2017-04-24 RX ADMIN — ROBINUL 0.4 MG: 0.2 INJECTION INTRAMUSCULAR; INTRAVENOUS at 11:45

## 2017-04-24 RX ADMIN — LIDOCAINE HYDROCHLORIDE 30 MG: 10 INJECTION, SOLUTION INFILTRATION; PERINEURAL at 10:51

## 2017-04-24 RX ADMIN — VALSARTAN 160 MG: 160 TABLET, FILM COATED ORAL at 15:00

## 2017-04-24 RX ADMIN — FAMOTIDINE 20 MG: 20 TABLET ORAL at 08:16

## 2017-04-24 RX ADMIN — INSULIN LISPRO 7 UNITS: 100 INJECTION, SOLUTION INTRAVENOUS; SUBCUTANEOUS at 12:38

## 2017-04-24 RX ADMIN — ATORVASTATIN CALCIUM 2.5 MG: 10 TABLET, FILM COATED ORAL at 15:02

## 2017-04-24 RX ADMIN — LEVOTHYROXINE SODIUM 50 MCG: 50 TABLET ORAL at 15:01

## 2017-04-24 RX ADMIN — FENTANYL CITRATE 50 MCG: 50 INJECTION, SOLUTION INTRAMUSCULAR; INTRAVENOUS at 09:07

## 2017-04-24 RX ADMIN — PROPOFOL 120 MG: 10 INJECTION, EMULSION INTRAVENOUS at 10:51

## 2017-04-24 RX ADMIN — FUROSEMIDE 40 MG: 40 TABLET ORAL at 15:00

## 2017-04-24 RX ADMIN — FENTANYL CITRATE 50 MCG: 50 INJECTION, SOLUTION INTRAMUSCULAR; INTRAVENOUS at 10:48

## 2017-04-24 RX ADMIN — DULOXETINE HYDROCHLORIDE 30 MG: 30 CAPSULE, DELAYED RELEASE ORAL at 15:00

## 2017-04-24 RX ADMIN — METFORMIN HYDROCHLORIDE 850 MG: 850 TABLET, FILM COATED ORAL at 21:42

## 2017-04-24 RX ADMIN — ASPIRIN 81 MG: 81 TABLET, COATED ORAL at 15:01

## 2017-04-24 RX ADMIN — ONDANSETRON 4 MG: 2 INJECTION INTRAMUSCULAR; INTRAVENOUS at 18:52

## 2017-04-24 NOTE — ANESTHESIA PROCEDURE NOTES
Peripheral IV    Patient location during procedure: OR  Line placed for Fluids/Medication Admin.  Performed By   Anesthesiologist: NEHEMIAS NEUMANN  Preanesthetic Checklist  Completed: patient identified, site marked, surgical consent, pre-op evaluation, timeout performed, IV checked, risks and benefits discussed and monitors and equipment checked  Peripheral IV Prep   Prep: alcohol swabs  Peripheral IV Procedure   Location:  Hand  Catheter size: 20 G         Post Assessment   Dressing Type: tape and transparent.    IV Dressing/Site: clean, dry and intact

## 2017-04-24 NOTE — ANESTHESIA PROCEDURE NOTES
Airway  Airway not difficult    General Information and Staff    Patient location during procedure: OR  CRNA: CARMEN LUNA    Indications and Patient Condition  Indications for airway management: airway protection    Preoxygenated: yes  MILS not maintained throughout  Mask difficulty assessment: 1 - vent by mask    Final Airway Details  Final airway type: endotracheal airway      Successful airway: ETT  Cuffed: yes   Successful intubation technique: direct laryngoscopy  Facilitating devices/methods: intubating stylet  Endotracheal tube insertion site: oral  Blade: Ramón  Blade size: #3  ETT size: 7.0 mm  Cormack-Lehane Classification: grade III - view of epiglottis only  Placement verified by: chest auscultation and capnometry   Measured from: lips  ETT to lips (cm): 20  Number of attempts at approach: 1    Additional Comments  Negative epigastric sounds, Breath sound equal bilaterally with symmetric chest rise and fall

## 2017-04-24 NOTE — ANESTHESIA POSTPROCEDURE EVALUATION
Patient: Paulina Johnson    Procedure Summary     Date Anesthesia Start Anesthesia Stop Room / Location    04/24/17 1046   [facility] / [facility]       Procedure Diagnosis Surgeon Provider    RIGHT RE-DO LUMBAR DISCECTOMY L4-5 (Right Spine Lumbar) Recurrent herniation of lumbar disc  (Recurrent herniation of lumbar disc [M51.26]) MD Ryan Rodriguez MD          Anesthesia Type: general  Last vitals  /66 (04/24/17 1215)    Temp 98.4 °F (36.9 °C) (04/24/17 1209)    Pulse 102 (04/24/17 1215)   Resp 14 (04/24/17 1215)    SpO2 97 % (04/24/17 1215)      Post Anesthesia Care and Evaluation    Patient location during evaluation: PACU  Patient participation: complete - patient participated  Level of consciousness: awake and alert  Pain score: 0  Pain management: adequate  Airway patency: patent  Anesthetic complications: No anesthetic complications  PONV Status: none  Cardiovascular status: hemodynamically stable and acceptable  Respiratory status: nonlabored ventilation, acceptable and nasal cannula  Hydration status: acceptable

## 2017-04-24 NOTE — ANESTHESIA PREPROCEDURE EVALUATION
Anesthesia Evaluation     Patient summary reviewed and Nursing notes reviewed   no history of anesthetic complications:  NPO Status: > 8 hours   Airway   Mallampati: II  TM distance: >3 FB  Neck ROM: full  no difficulty expected  Dental - normal exam     Pulmonary - normal exam    breath sounds clear to auscultation  (+) recent URI resolved,   Cardiovascular   Exercise tolerance: good (4-7 METS)    Rhythm: regular  Rate: abnormal    (+) hypertension well controlled,       Neuro/Psych  (+) TIA, CVA residual symptoms, numbness, psychiatric history Anxiety,    GI/Hepatic/Renal/Endo    (+)  diabetes mellitus type 2 poorly controlled using insulin, hypothyroidism,     Musculoskeletal     (+) back pain,   Abdominal  - normal exam  (-) obese    Abdomen: soft.   Substance History      OB/GYN          Other   (+) arthritis                               Anesthesia Plan    ASA 3     general     intravenous induction   Anesthetic plan and risks discussed with patient.    Plan discussed with CRNA.

## 2017-04-24 NOTE — PLAN OF CARE
Problem: Patient Care Overview (Adult)  Goal: Plan of Care Review  Outcome: Ongoing (interventions implemented as appropriate)    04/24/17 8296   Coping/Psychosocial Response Interventions   Plan Of Care Reviewed With patient;spouse   Patient Care Overview   Progress no change   Outcome Evaluation   Outcome Summary/Follow up Plan Patient is taking po pain meds and so far they seem to control the pain.

## 2017-04-24 NOTE — H&P (VIEW-ONLY)
Patient: Paulina Johnson  : 1945     Primary Care Provider: Hernan Thompson MD     Requesting Provider: As above           History     Chief Complaint: Back and right leg pain.     History of Present Illness: Mr. Johnson is a 71-year-old woman who is well known to my service. She presented with severe back and predominantly right leg pain. She had significant walking and standing intolerance. Her studies had revealed a very large disc herniation at L4-5 with marked compromise of the spinal canal. On 2016 she underwent right L4-5 discectomy. She was seen on one occasion in early January and was doing much better. She went to [place] and was doing nicely. She returned home about 3-1/2 weeks ago and then awoke a morning or 2 later with severe pain in her back right buttock. The pain sometimes will extend all the way down to the foot. She has no left-sided symptoms. She denies bowel or bladder dysfunction. She continues to use a rolling walker to get around given her left pain. Her pain seems to be worse in the mornings.     Review of Systems   Constitutional: Negative for activity change, appetite change, chills, diaphoresis, fatigue, fever and unexpected weight change.   HENT: Negative for congestion, dental problem, drooling, ear discharge, ear pain, facial swelling, hearing loss, mouth sores, nosebleeds, postnasal drip, rhinorrhea, sinus pressure, sneezing, sore throat, tinnitus, trouble swallowing and voice change.   Eyes: Negative for photophobia, pain, discharge, redness, itching and visual disturbance.   Respiratory: Negative for apnea, cough, choking, chest tightness, shortness of breath, wheezing and stridor.   Cardiovascular: Negative for chest pain, palpitations and leg swelling.   Gastrointestinal: Negative for abdominal distention, abdominal pain, anal bleeding, blood in stool, constipation, diarrhea, nausea, rectal pain and vomiting.   Endocrine: Negative for cold intolerance,  "heat intolerance, polydipsia, polyphagia and polyuria.   Genitourinary: Negative for decreased urine volume, difficulty urinating, dysuria, enuresis, flank pain, frequency, genital sores, hematuria and urgency.   Musculoskeletal: Positive for back pain. Negative for arthralgias, gait problem, joint swelling, myalgias, neck pain and neck stiffness.   Skin: Negative for color change, pallor, rash and wound.   Allergic/Immunologic: Negative for environmental allergies, food allergies and immunocompromised state.   Neurological: Negative for dizziness, tremors, seizures, syncope, facial asymmetry, speech difficulty, weakness, light-headedness, numbness and headaches.   Hematological: Negative for adenopathy. Does not bruise/bleed easily.   Psychiatric/Behavioral: Negative for agitation, behavioral problems, confusion, decreased concentration, dysphoric mood, hallucinations, self-injury, sleep disturbance and suicidal ideas. The patient is not nervous/anxious and is not hyperactive.         The patient's past medical history, past surgical history, family history, and social history have been reviewed at length in the electronic medical record.     Past Medical History:   Diagnosis Date   • Arthritis    • Back pain    • Depression    • Diabetes mellitus     checks sugar 3 times per day    • History of stroke     2015   • Hyperlipidemia    • Hypertension    • Skin cancer     generalized areas multiple areas   • Wears glasses     \"driving\"     Past Surgical History:   Procedure Laterality Date   • ANTERIOR CERVICAL DISCECTOMY  12/18/2003    ACD w/ allografting and plating C5-7 (Dr. Dhaliwal)   • COLONOSCOPY      x5   • EYE SURGERY      cataracts bilat with lens    • HEMORRHOIDECTOMY     • KIDNEY SURGERY     • LUMBAR DISCECTOMY Right 12/14/2016    Procedure: RIGHT LUMBAR DISCECTOMY L4-5;  Surgeon: Vick Dhaliwal MD;  Location: [facility];  Service:    • REPLACEMENT TOTAL KNEE Bilateral    • TIBIA FRACTURE SURGERY      right  " "    No family history on file.  Social History     Social History   • Marital status:      Spouse name: N/A   • Number of children: N/A   • Years of education: N/A     Occupational History   • Not on file.     Social History Main Topics   • Smoking status: Never Smoker   • Smokeless tobacco: Never Used   • Alcohol use No   • Drug use: No   • Sexual activity: Not on file     Other Topics Concern   • Not on file     Social History Narrative     Allergies   Allergen Reactions   • Azithromycin Other (See Comments)     unsure   • Ciprofloxacin Other (See Comments)     unsure   • Daypro [Oxaprozin] Other (See Comments)     unsure   • Lortab [Hydrocodone-Acetaminophen] Other (See Comments)     unsure   • Penicillins Other (See Comments)     unsure       Physical Exam:   Temp 98.4 °F (36.9 °C)  Ht 62\" (157.5 cm)  Wt 138 lb (62.6 kg)  BMI 25.24 kg/m2  MUSCULOSKELETAL:  Straight leg raising is negative.  Edgar's Sign is negative.  ROM in back normal.  Tenderness in the back to palpation is not observed.  NEUROLOGICAL:  Strength is intact in the lower extremities to direct testing.  Muscle tone is normal throughout.  Station and gait are normal.  Sensation is intact to light touch testing throughout.        Medical Decision Making     Data Review:   Lumbar MRI from today reveals a large recurrent disc extrusion on the right at L4-5 that extends into the recess and even into the proximal foramen.     Diagnosis:   Recurrent right L4-5 disc herniation with severe radiculopathy.      Treatment Options:   Given her level of pain I recommended redo right L4-5 discectomy. This will likely improve her symptoms substantially although it may not completely eradicate them. The nature of the procedure as well as the potential risks, complications, limitations, and alternatives to the procedure were discussed at length with the patient and the patient has agreed to proceed with surgery.          Diagnosis Plan   1. Recurrent " displacement of lumbar disc      2. Herniation of lumbar intervertebral disc with radiculopathy      3. Lumbar stenosis with neurogenic claudication      4. Lumbar disc herniation

## 2017-04-24 NOTE — OP NOTE
DATE OF PROCEDURE:  04/24/2017    PREOPERATIVE DIAGNOSIS:  Recurrent right L4-L5 disk herniation.      POSTOPERATIVE DIAGNOSIS: Recurrent right L4-L5 disk herniation.     PROCEDURES PERFORMED:  Redo right L4-L5 diskectomy.     SURGEON: Vick Dhaliwal MD     ASSISTANTS:   1.  Dee Flores PA-C   2.  Fadia Bazan PA-C     ANESTHESIA: General endotracheal.     ESTIMATED BLOOD LOSS: Minimal.     SPECIMEN: Disk was harvested, but not sent to pathology.     COMPLICATIONS: None.     INDICATIONS FOR PROCEDURE: The patient is a 71-year-old woman, who in 120/2016 underwent uncomplicated right L4-L5 diskectomy and did well for a long time and then developed recurrent symptoms more recently. She has had severe debilitating pain extending from her back into the right leg. Studies reveal a large recurrent disk herniation at L4-L5 that was felt to be the source for her symptoms. As such, she presents at this time for redo diskectomy. The nature of the procedure, as well as the potential risks, complications, and limitations have been outlined to the patient and she has agreed to proceed with surgery.     DESCRIPTION OF PROCEDURE: The patient was brought to the operating room and while on her cart, general endotracheal anesthesia was achieved. She was then turned prone onto the [place] saddle frame. Special care was ensured to protect pressure points. Her low back was prepared and draped in the usual fashion. The previous incision was opened. The underlying tissues were divided with cautery to provide exposure to the right L4 hemilamina. Another radiograph confirmed this to be the operative level. The laminotomy was extended up as was the medial facetectomy extended. Granulation tissue was taken down. A large amount of gritty disk material had extruded into the gutter and outside of the disk space. This was evacuated using suction and a small pituitary rongeur.  A blunt probe was utilized to tease additional disk material from  cephalad and more contralaterally.  The disk space was incised and additional friable disk material was evacuated using an array of pituitary rongeurs. At completion of the procedure, the dural sac was well decompressed. No further disk fragments were noted. A blunt probe could be passed along the L4 and L5 nerve roots into the foramina. With a Valsalva maneuver, there was no significant bleeding or evidence of CSF leak. The wound was irrigated with an antibiotic-containing solution. The paraspinous muscle fascia was reapproximated in an interrupted fashion with 0 Vicryl suture. Then, 0.25% Marcaine was instilled in the paraspinous musculature and subcutaneous tissue. The subcutaneous tissues were closed in layers with 3-0 Vicryl suture. The skin was closed in a running subcuticular fashion with 3-0 Vicryl suture. Steri-Strips and sterile dressing were applied. She was rolled onto her cart, extubated, and taken to the recovery room in satisfactory condition. She did receive preoperative antibiotics.       MD JADA Salinas/gilma  DD: 04/24/2017 12:01:43  DT: 04/24/2017 13:23:31  Voice Rec. ID #[id number]  Voice Original ID #[id number]  Doc ID #[id number]  Rev. #0  cc:

## 2017-04-24 NOTE — ANESTHESIA POSTPROCEDURE EVALUATION
Patient: Paulina Johnson    Procedure Summary     Date Anesthesia Start Anesthesia Stop Room / Location    04/24/17 1046  [facility]       Procedure Diagnosis Surgeon Provider    RIGHT RE-DO LUMBAR DISCECTOMY L4-5 (Right Spine Lumbar) Recurrent herniation of lumbar disc  (Recurrent herniation of lumbar disc [M51.26]) MD Ryan Rodriguez MD          Anesthesia Type: general  Last vitals  BP     Temp      Pulse     Resp      SpO2        Post Anesthesia Care and Evaluation    Patient location during evaluation: PACU  Patient participation: complete - patient participated  Level of consciousness: awake and alert  Pain score: 0  Pain management: adequate  Airway patency: patent  Anesthetic complications: No anesthetic complications  PONV Status: none  Cardiovascular status: hemodynamically stable and acceptable  Respiratory status: nonlabored ventilation, acceptable and nasal cannula  Hydration status: acceptable

## 2017-04-24 NOTE — INTERVAL H&P NOTE
"Pre-Op H&P (See Recent Office Note Attached)    Chief complaint: Low back pain into RLE    Review of Systems:  General ROS:  no fever, chills, rashes, No change since last office visit  Cardiovascular ROS: no chest pain or dyspnea on exertion  Respiratory ROS: no cough, shortness of breath, or wheezing  Endo:  BS 70's last night and didn't take insulins.  This AM preop , 10 u R insulin given.  Recheck 400, just given 5 u R insulin.  Will monitor BS and treatment per anesthesia for control perioperatively.    Immunization History:   Influenza:  Yes 2016  Pneumococcal:  Yes < 5 years  Tetanus:  unknown    Vital Signs:  BP (!) 183/85 (BP Location: Right arm, Patient Position: Lying)  Pulse 96  Temp 97.7 °F (36.5 °C) (Temporal Artery )   Resp 18  Ht 62\" (157.5 cm)  Wt 141 lb (64 kg)  SpO2 98%  BMI 25.79 kg/m2    Physical Exam:    CV:  S1S2 regular rate and rhythm, no murmur               Resp:  Clear to auscultation; respirations regular, even and unlabored    Results Review:    I reviewed the patient's new clinical results.    Maddie Terry, APRN  4/24/2017 9:04 AM      "

## 2017-04-24 NOTE — BRIEF OP NOTE
LUMBAR DISCECTOMY POSTERIOR 1-2 LEVELS  Procedure Note    Paulina Johnson  4/24/2017    Pre-op Diagnosis:   Recurrent herniation of lumbar disc [M51.26]    Post-op Diagnosis:     Post-Op Diagnosis Codes:     * Recurrent herniation of lumbar disc [M51.26]    Procedure/CPT® Codes:      Procedure(s):  RIGHT RE-DO LUMBAR DISCECTOMY L4-5    Surgeon(s):  Vick Dhaliwal MD    Anesthesia: General    Staff:   Circulator: Sima Brush RN; Sima Nix RN  Scrub Person: Guanaco Law; Yanci Moy  Nursing Assistant: Ansley Alcantara  Assistant: Dee Flores PA-C; Fadia Bazan PA-C    Estimated Blood Loss: * No values recorded between 4/24/2017 10:47 AM and 4/24/2017 12:02 PM *  Urine Voided: * No values recorded between 4/24/2017 10:47 AM and 4/24/2017 12:02 PM *    Specimens:                * No specimens in log *      Drains:           Findings: As above    Complications: None      Vick Dhaliwal MD     Date: 4/24/2017  Time: 12:02 PM

## 2017-04-25 VITALS
RESPIRATION RATE: 16 BRPM | OXYGEN SATURATION: 97 % | WEIGHT: 141 LBS | DIASTOLIC BLOOD PRESSURE: 53 MMHG | TEMPERATURE: 97.4 F | HEART RATE: 85 BPM | SYSTOLIC BLOOD PRESSURE: 114 MMHG | BODY MASS INDEX: 25.95 KG/M2 | HEIGHT: 62 IN

## 2017-04-25 LAB
GLUCOSE BLDC GLUCOMTR-MCNC: 172 MG/DL (ref 70–130)
GLUCOSE BLDC GLUCOMTR-MCNC: 195 MG/DL (ref 70–130)
GLUCOSE BLDC GLUCOMTR-MCNC: 363 MG/DL (ref 70–130)
GLUCOSE BLDC GLUCOMTR-MCNC: 45 MG/DL (ref 70–130)
GLUCOSE BLDC GLUCOMTR-MCNC: 51 MG/DL (ref 70–130)
GLUCOSE BLDC GLUCOMTR-MCNC: 54 MG/DL (ref 70–130)
GLUCOSE BLDC GLUCOMTR-MCNC: 80 MG/DL (ref 70–130)
GLUCOSE BLDC GLUCOMTR-MCNC: 82 MG/DL (ref 70–130)

## 2017-04-25 PROCEDURE — 82962 GLUCOSE BLOOD TEST: CPT

## 2017-04-25 PROCEDURE — 63710000001 INSULIN LISPRO (HUMAN) PER 5 UNITS: Performed by: NEUROLOGICAL SURGERY

## 2017-04-25 PROCEDURE — 63710000001 INSULIN DETEMIR PER 5 UNITS: Performed by: NEUROLOGICAL SURGERY

## 2017-04-25 PROCEDURE — G0378 HOSPITAL OBSERVATION PER HR: HCPCS

## 2017-04-25 RX ORDER — CLOPIDOGREL BISULFATE 75 MG/1
75 TABLET ORAL DAILY
Qty: 30 TABLET | Status: ON HOLD
Start: 2017-04-25 | End: 2017-07-08

## 2017-04-25 RX ORDER — OXYCODONE HYDROCHLORIDE AND ACETAMINOPHEN 5; 325 MG/1; MG/1
1 TABLET ORAL 3 TIMES DAILY PRN
Qty: 50 TABLET | Refills: 0 | Status: SHIPPED | OUTPATIENT
Start: 2017-04-25 | End: 2017-06-09 | Stop reason: SDUPTHER

## 2017-04-25 RX ADMIN — DULOXETINE HYDROCHLORIDE 30 MG: 30 CAPSULE, DELAYED RELEASE ORAL at 08:06

## 2017-04-25 RX ADMIN — VALSARTAN 160 MG: 160 TABLET, FILM COATED ORAL at 08:06

## 2017-04-25 RX ADMIN — ATORVASTATIN CALCIUM 2.5 MG: 10 TABLET, FILM COATED ORAL at 08:06

## 2017-04-25 RX ADMIN — INSULIN DETEMIR 15 UNITS: 100 INJECTION, SOLUTION SUBCUTANEOUS at 08:09

## 2017-04-25 RX ADMIN — FAMOTIDINE 20 MG: 20 TABLET ORAL at 08:06

## 2017-04-25 RX ADMIN — OXYCODONE AND ACETAMINOPHEN 1 TABLET: 5; 325 TABLET ORAL at 01:44

## 2017-04-25 RX ADMIN — SODIUM CHLORIDE, POTASSIUM CHLORIDE, SODIUM LACTATE AND CALCIUM CHLORIDE 75 ML/HR: 600; 310; 30; 20 INJECTION, SOLUTION INTRAVENOUS at 01:12

## 2017-04-25 RX ADMIN — ASPIRIN 81 MG: 81 TABLET, COATED ORAL at 08:06

## 2017-04-25 RX ADMIN — LEVOTHYROXINE SODIUM 50 MCG: 50 TABLET ORAL at 08:07

## 2017-04-25 RX ADMIN — INSULIN HUMAN 3 UNITS: 100 INJECTION, SOLUTION PARENTERAL at 06:20

## 2017-04-25 RX ADMIN — FUROSEMIDE 40 MG: 40 TABLET ORAL at 08:06

## 2017-04-25 RX ADMIN — INSULIN HUMAN 12 UNITS: 100 INJECTION, SOLUTION PARENTERAL at 00:17

## 2017-04-25 RX ADMIN — OXYCODONE AND ACETAMINOPHEN 1 TABLET: 5; 325 TABLET ORAL at 11:20

## 2017-04-25 RX ADMIN — METFORMIN HYDROCHLORIDE 850 MG: 850 TABLET, FILM COATED ORAL at 08:06

## 2017-04-25 RX ADMIN — INSULIN LISPRO 12 UNITS: 100 INJECTION, SOLUTION INTRAVENOUS; SUBCUTANEOUS at 08:14

## 2017-04-25 NOTE — PROGRESS NOTES
Discharge Planning Assessment  [facility]     Patient Name: Paulina Johnson  MRN: [id number]  Today's Date: 4/25/2017    Admit Date: 4/24/2017          Discharge Needs Assessment       04/25/17 1012    Discharge Needs Assessment    Equipment Currently Used at Home glucometer;grab bar;walker, rolling;cane, quad    Equipment Needed After Discharge none    Transportation Available car;family or friend will provide      04/25/17 1007    Living Environment    Lives With spouse    Living Arrangements condominium    Provides Primary Care For no one    Primary Care Provided By spouse/significant other    Quality Of Family Relationships supportive    Able to Return to Prior Living Arrangements yes    Discharge Needs Assessment    Concerns To Be Addressed no discharge needs identified    Readmission Within The Last 30 Days no previous admission in last 30 days            Discharge Plan       04/25/17 1021    Case Management/Social Work Plan    Plan D/C Plan    Patient/Family In Agreement With Plan yes    Additional Comments Spoke with patient. Her d/c plan is to go home. She denies any d/c needs. Family will transport home in a private vehicle. CM will continue to follow.        Discharge Placement     No information found        Expected Discharge Date and Time     Expected Discharge Date Expected Discharge Time    Apr 25, 2017               Demographic Summary       04/25/17 1008    Primary Care Physician Information    Name Dr. Hernan Thompson      04/25/17 1005    Referral Information    Admission Type inpatient    Arrived From home or self-care    Referral Source nursing    Reason For Consult discharge planning    Record Reviewed history and physical;medical record    Primary Care Physician Information    Name             Functional Status       04/25/17 1006    Functional Status Current    Current Functional Level Comment See PT/ Nursing assessment    Change in Functional Status Since Onset of Current  Illness/Injury yes    Functional Status Prior    Ambulation 1-->assistive equipment    Transferring 0-->independent    Toileting 0-->independent    Bathing 0-->independent    Dressing 0-->independent    Eating 0-->independent    Communication 0-->understands/communicates without difficulty    Swallowing 0-->swallows foods/liquids without difficulty    IADL    Medications independent    Meal Preparation independent    Housekeeping independent    Laundry independent    Shopping independent    Oral Care independent    Activity Tolerance    Current Activity Limitations none    Usual Activity Tolerance moderate    Current Activity Tolerance fair    Employment/Financial    Employment/Finance Comments Insurance information verified            Psychosocial     None            Abuse/Neglect     None            Legal     None            Substance Abuse     None            Patient Forms     None          Faith Jensen, APRN

## 2017-04-25 NOTE — PROGRESS NOTES
"NEUROSURGERY PROGRESS NOTE     LOS: 0 days   Patient Care Team:  Hernan Thompson MD as PCP - General (Internal Medicine)  Hernan Thompson MD as Referring Physician (Internal Medicine)    Chief Complaint: Back and right leg pain.    POD#: 1 Day Post-Op  Procedures:  Redo right L4-5 discectomy.    Interval History:   Patient Complaints: Ongoing back and right leg pain with ambulation, but overall improved.  Patient Denies: Weakness, numbness, or voiding difficulty.    Vital Signs: Blood pressure 115/56, pulse 95, temperature 97 °F (36.1 °C), temperature source Temporal Artery , resp. rate 16, height 62\" (157.5 cm), weight 141 lb (64 kg), SpO2 90 %.  Intake/Output:   Intake/Output Summary (Last 24 hours) at 04/25/17 0553  Last data filed at 04/25/17 0142   Gross per 24 hour   Intake             1050 ml   Output             1550 ml   Net             -500 ml       Physical Exam:  The patient is awake, alert, and well oriented.  Motor and sensory function is intact in both lower extremities.  Dry dressing is in place on her incision.     Assessment/Plan:  1.  Recurrent right L4-5 discectomy status post redo discectomy.  2.  Disposition: Mobilize.  Home at midday.  Residual pain should dissipate over time.      Vick Dhaliwal MD  04/25/17  5:53 AM    "

## 2017-04-25 NOTE — PLAN OF CARE
Problem: Patient Care Overview (Adult)  Goal: Plan of Care Review  Outcome: Ongoing (interventions implemented as appropriate)    04/25/17 0419   Coping/Psychosocial Response Interventions   Plan Of Care Reviewed With patient   Patient Care Overview   Progress progress toward functional goals as expected         Problem: Perioperative Period (Adult)  Goal: Signs and Symptoms of Listed Potential Problems Will be Absent or Manageable (Perioperative Period)  Outcome: Ongoing (interventions implemented as appropriate)

## 2017-04-27 ENCOUNTER — TELEPHONE (OUTPATIENT)
Dept: NEUROSURGERY | Facility: CLINIC | Age: 72
End: 2017-04-27

## 2017-04-27 RX ORDER — POLYETHYLENE GLYCOL 3350 17 G/17G
17 POWDER, FOR SOLUTION ORAL 2 TIMES DAILY
Qty: 250 G | Refills: 1 | Status: SHIPPED | OUTPATIENT
Start: 2017-04-27 | End: 2017-06-21 | Stop reason: SDUPTHER

## 2017-04-27 RX ORDER — DOCUSATE SODIUM 100 MG/1
100 CAPSULE, LIQUID FILLED ORAL 3 TIMES DAILY PRN
Qty: 45 CAPSULE | Refills: 0 | Status: SHIPPED | OUTPATIENT
Start: 2017-04-27 | End: 2017-06-21 | Stop reason: SDUPTHER

## 2017-04-27 NOTE — TELEPHONE ENCOUNTER
Provider:  Isra  Caller: Paulina Johnson  Time of call:   09:17 AM  Phone #:  [phone]  Surgery:  RIGHT RE-DO LUMBAR DISCECTOMY L4-5  Surgery Date:  04/24/17  Last visit:   04/19/17  Next visit: 05/17/17        Reason for call:       Pt called in, said that she has not had a bowel movement since before surgery, would like us to call something in for her to help with this.

## 2017-04-27 NOTE — TELEPHONE ENCOUNTER
Called pt back, adv of the medications that were called in as well as Kathy's advice. She understood, no further questions

## 2017-05-03 ENCOUNTER — TELEPHONE (OUTPATIENT)
Dept: NEUROSURGERY | Facility: CLINIC | Age: 72
End: 2017-05-03

## 2017-05-04 RX ORDER — OXYCODONE HYDROCHLORIDE AND ACETAMINOPHEN 5; 325 MG/1; MG/1
1 TABLET ORAL EVERY 8 HOURS PRN
Qty: 45 TABLET | Refills: 0 | Status: SHIPPED | OUTPATIENT
Start: 2017-05-04 | End: 2017-06-21 | Stop reason: SDUPTHER

## 2017-05-08 ENCOUNTER — TELEPHONE (OUTPATIENT)
Dept: NEUROSURGERY | Facility: CLINIC | Age: 72
End: 2017-05-08

## 2017-05-09 RX ORDER — METHYLPREDNISOLONE 4 MG/1
TABLET ORAL
Qty: 1 EACH | Refills: 0 | Status: SHIPPED | OUTPATIENT
Start: 2017-05-09 | End: 2017-06-21

## 2017-05-17 ENCOUNTER — OFFICE VISIT (OUTPATIENT)
Dept: NEUROSURGERY | Facility: CLINIC | Age: 72
End: 2017-05-17

## 2017-05-17 VITALS
DIASTOLIC BLOOD PRESSURE: 70 MMHG | WEIGHT: 133 LBS | HEIGHT: 62 IN | BODY MASS INDEX: 24.48 KG/M2 | TEMPERATURE: 97.6 F | SYSTOLIC BLOOD PRESSURE: 122 MMHG

## 2017-05-17 DIAGNOSIS — M51.26 RECURRENT HERNIATION OF LUMBAR DISC: Primary | ICD-10-CM

## 2017-05-17 DIAGNOSIS — M51.16 HERNIATION OF LUMBAR INTERVERTEBRAL DISC WITH RADICULOPATHY: ICD-10-CM

## 2017-05-17 PROCEDURE — 99024 POSTOP FOLLOW-UP VISIT: CPT | Performed by: PHYSICIAN ASSISTANT

## 2017-05-17 RX ORDER — GABAPENTIN 300 MG/1
CAPSULE ORAL
Qty: 90 CAPSULE | Refills: 0 | Status: SHIPPED | OUTPATIENT
Start: 2017-05-17 | End: 2017-06-21 | Stop reason: SINTOL

## 2017-05-30 ENCOUNTER — TELEPHONE (OUTPATIENT)
Dept: NEUROSURGERY | Facility: CLINIC | Age: 72
End: 2017-05-30

## 2017-06-08 ENCOUNTER — TELEPHONE (OUTPATIENT)
Dept: NEUROSURGERY | Facility: CLINIC | Age: 72
End: 2017-06-08

## 2017-06-08 NOTE — TELEPHONE ENCOUNTER
We usually wait a couple of months, unless the pain is severe and out of proportion to normal postop pain.  She hasn't been seen since 5/17. I would be happy to see her tomorrow if she would like, or we can work her in with Kathy Fernando or Dee next week.

## 2017-06-08 NOTE — TELEPHONE ENCOUNTER
Called pt and she would like to be seen tomorrow. She has been scheduled to see Sweetie Bazan PA-C tomorrow at 1:30pm.

## 2017-06-08 NOTE — TELEPHONE ENCOUNTER
Provider:  Isra  Caller: self  Time of call: 131    Phone #:  [phone]  Surgery:  Lumbar discectomy re-do  Surgery Date:  4/24/17  Last visit:   5/17/17  Next visit: 6/30/17         Reason for call:         Pt called to report that she cannot tolerate gabapentin or Lyrica as they cause undesirable side effects. She would like to know how long to wait for her pain to improve before pursuing another therapeutic option. Also, she says she called yesterday and was never called back, there is no documentation for this.

## 2017-06-09 ENCOUNTER — OFFICE VISIT (OUTPATIENT)
Dept: NEUROSURGERY | Facility: CLINIC | Age: 72
End: 2017-06-09

## 2017-06-09 VITALS
SYSTOLIC BLOOD PRESSURE: 140 MMHG | HEIGHT: 62 IN | BODY MASS INDEX: 24.95 KG/M2 | DIASTOLIC BLOOD PRESSURE: 70 MMHG | WEIGHT: 135.6 LBS | TEMPERATURE: 96.5 F

## 2017-06-09 DIAGNOSIS — M51.16 HERNIATION OF LUMBAR INTERVERTEBRAL DISC WITH RADICULOPATHY: ICD-10-CM

## 2017-06-09 DIAGNOSIS — Z98.890 S/P DISCECTOMY: Primary | ICD-10-CM

## 2017-06-09 DIAGNOSIS — M79.604 RIGHT LEG PAIN: ICD-10-CM

## 2017-06-09 PROCEDURE — 99024 POSTOP FOLLOW-UP VISIT: CPT | Performed by: PHYSICIAN ASSISTANT

## 2017-06-09 RX ORDER — OXYCODONE HYDROCHLORIDE AND ACETAMINOPHEN 5; 325 MG/1; MG/1
1 TABLET ORAL 3 TIMES DAILY PRN
Qty: 50 TABLET | Refills: 0 | Status: SHIPPED | OUTPATIENT
Start: 2017-06-09 | End: 2017-06-21 | Stop reason: SDUPTHER

## 2017-06-09 RX ORDER — OXYCODONE HYDROCHLORIDE AND ACETAMINOPHEN 5; 325 MG/1; MG/1
1 TABLET ORAL 3 TIMES DAILY PRN
Qty: 50 TABLET | Refills: 0 | Status: SHIPPED | OUTPATIENT
Start: 2017-06-09 | End: 2017-06-09 | Stop reason: SDUPTHER

## 2017-06-09 NOTE — PROGRESS NOTES
"Subjective     Chief Complaint:  Paulina Johnson is a 71 y.o. female who is here today for a second follow up after repeat discectomy on 4/24/17    History of Present Illness  Ms. Johnson is a very pleasant 71-year-old female who underwent an uncomplicated right L4-5 discectomy in December 2016. And initially did very well.  In March of this year, she had gone on vacation to [place], and upon her return, she began having severe pain in her back and into the right leg.  An MRI revealed a large recurrent disc herniation at L4 5, and on 4/24/2017 she underwent redo right L4 5 discectomy.     She was seen for her first postop visit from this second surgery on 5/17/17, at 3 weeks postop.  At that time, she had only modest improvement in her symptoms, with a few good days and some bad days. Since then, she states that she has had persistent pain in her right leg. It radiates from her back down the back of her right thigh, then wraps to the lateral thigh and into the front of her right shin.  She can get some relief lying flat, but sitting, especially in a car, is uncomfortable.  Standing and walking make the pain grow increasingly severe, and she cannot even stand at the sink to do dishes for more than about 10min. This has affected her quality of life significantly as she is only up long enough to get about within the home, and she is not really able to participate in cooking or chores much.   She is taking two to three Percocet 5 daily. She consistently needs one in the morning and one at bedtime. She takes aleve during the day and avoids the narcotics as much as possible.    When seen on 5/17, she was started on Gabapentin; this made her very dizzy and she could not tolerate it. She was switched to Lyrica, which also made her feel \"loopy\".  She denies weakness or falls. She uses a walker to help her with ambulation. She denies saddle anesthesia, bowel, or bladder dysfunction. She has no left leg pain.     The " following portions of the patient's history were reviewed and updated as appropriate: allergies, current medications, past family history, past medical history, past social history, past surgical history and problem list.    Family history: History reviewed. No pertinent family history.    Social history:   Social History     Social History   • Marital status:      Spouse name: N/A   • Number of children: N/A   • Years of education: N/A     Occupational History   • Not on file.     Social History Main Topics   • Smoking status: Never Smoker   • Smokeless tobacco: Never Used   • Alcohol use No   • Drug use: No   • Sexual activity: Not on file     Other Topics Concern   • Not on file     Social History Narrative       Review of Systems   Constitutional: Negative for activity change, appetite change, chills, diaphoresis, fatigue, fever and unexpected weight change.   HENT: Negative for congestion, dental problem, drooling, ear discharge, ear pain, facial swelling, hearing loss, mouth sores, nosebleeds, postnasal drip, rhinorrhea, sinus pressure, sneezing, sore throat, tinnitus, trouble swallowing and voice change.    Eyes: Negative for photophobia, pain, discharge, redness, itching and visual disturbance.   Respiratory: Negative for apnea, cough, choking, chest tightness, shortness of breath, wheezing and stridor.    Cardiovascular: Negative for chest pain, palpitations and leg swelling.   Gastrointestinal: Negative for abdominal distention, abdominal pain, anal bleeding, blood in stool, constipation, diarrhea, nausea, rectal pain and vomiting.   Endocrine: Negative for cold intolerance, heat intolerance, polydipsia, polyphagia and polyuria.   Genitourinary: Negative for decreased urine volume, difficulty urinating, dysuria, enuresis, flank pain, frequency, genital sores, hematuria and urgency.   Musculoskeletal: Negative for arthralgias, back pain, gait problem, joint swelling, myalgias, neck pain and neck  "stiffness.   Skin: Negative for color change, pallor, rash and wound.   Allergic/Immunologic: Negative for environmental allergies, food allergies and immunocompromised state.   Neurological: Negative for dizziness, tremors, seizures, syncope, facial asymmetry, speech difficulty, weakness, light-headedness, numbness and headaches.   Hematological: Negative for adenopathy. Does not bruise/bleed easily.   Psychiatric/Behavioral: Negative for agitation, behavioral problems, confusion, decreased concentration, dysphoric mood, hallucinations, self-injury, sleep disturbance and suicidal ideas. The patient is not nervous/anxious and is not hyperactive.        Objective   Blood pressure 140/70, temperature 96.5 °F (35.8 °C), height 62\" (157.5 cm), weight 135 lb 9.6 oz (61.5 kg).  Body mass index is 24.8 kg/(m^2).    Physical Exam   Constitutional: She is oriented to person, place, and time. She appears well-developed and well-nourished. No distress.   Patient is neat and well groomed.  Patient is sitting with her weight resting on the left side throughout our interview and exam   HENT:   Head: Normocephalic and atraumatic.   Eyes: EOM are normal. Pupils are equal, round, and reactive to light.   Neck: Normal range of motion. Neck supple.   Pulmonary/Chest: Effort normal. No respiratory distress.   Musculoskeletal:   Gait is slow, but steady and independent without spasticity.  She turns her right foot out slightly with walking.  LE strength is 4/5 and equal to direct testing bilaterally except dorsiflexion of the right foot, which is slightly weaker on the right side.  Straight leg raise is negative bilaterally   Neurological: She is alert and oriented to person, place, and time.   LE reflexes are absent at the right patella and ankle. They are 1+ on the left     Skin: Skin is warm and dry.   Psychiatric: She has a normal mood and affect. Her behavior is normal.   Pleasant and cooperative. Recent and remotememory intact.  "         Assessment/Plan   Ms Johnson has continued to struggle, with little improvement since her second discectomy.  This is clearly affecting her day-to-day quality of life.  We will obtain a new MRI of the lumbar spine as well as flexion/extension xrays since her symptoms are consistently worse with walking or standing of any duration.  She will follow up with Dr. Dhaliwal after these are complete for him to review the images.     Paulina was seen today for back pain.    Diagnoses and all orders for this visit:    S/P right L4-5 laminotomy with medial facetectomy, foraminotomy and L4-5 diskectomy  -     MRI Lumbar Spine With & Without Contrast; Future  -     XR spine lumbar complete w flex ext; Future    Right leg pain    Herniation of lumbar intervertebral disc with radiculopathy  -     Discontinue: oxyCODONE-acetaminophen (PERCOCET) 5-325 MG per tablet; Take 1 tablet by mouth 3 (Three) Times a Day As Needed (as needed for moderate pain).  -     oxyCODONE-acetaminophen (PERCOCET) 5-325 MG per tablet; Take 1 tablet by mouth 3 (Three) Times a Day As Needed (as needed for moderate pain).      Return in about 2 weeks (around 6/23/2017).

## 2017-06-19 ENCOUNTER — TELEPHONE (OUTPATIENT)
Dept: NEUROSURGERY | Facility: CLINIC | Age: 72
End: 2017-06-19

## 2017-06-19 NOTE — TELEPHONE ENCOUNTER
Provider:  Isra  Caller: Paulina Johnson  Time of call:   11:48 AM  Phone #:  [phone]  Surgery:  RIGHT RE-DO LUMBAR DISCECTOMY L4-5  Surgery Date:  04/24/17  Last visit:   06/09/17  Next visit: 06/21 & 06/30?    Reason for call:       Pt called in and left strange voicemail, didn't really make sense, just something about how she probably won't be able to make her appointment because she's been sick, didn't go into details.     Called pt back, she said that she is in such extreme pain that she has been sick and throwing up, as such she wanted to move her appointment up. Adv pt that her appt is on Wednesday, the day after tomorrow, she understood but still wanted to see if she could come in tomorrow because she is hurting so bad. Adv her that I would transfer her to [facility] to see if she could reschedule her appointment to Tuesday, she understood. Transferred pt.

## 2017-06-21 ENCOUNTER — PREP FOR SURGERY (OUTPATIENT)
Dept: OTHER | Facility: HOSPITAL | Age: 72
End: 2017-06-21

## 2017-06-21 ENCOUNTER — OFFICE VISIT (OUTPATIENT)
Dept: NEUROSURGERY | Facility: CLINIC | Age: 72
End: 2017-06-21

## 2017-06-21 ENCOUNTER — HOSPITAL ENCOUNTER (OUTPATIENT)
Dept: MRI IMAGING | Facility: HOSPITAL | Age: 72
Discharge: HOME | End: 2017-06-21
Admitting: PHYSICIAN ASSISTANT

## 2017-06-21 ENCOUNTER — HOSPITAL ENCOUNTER (OUTPATIENT)
Dept: GENERAL RADIOLOGY | Facility: HOSPITAL | Age: 72
Discharge: HOME | End: 2017-06-21

## 2017-06-21 VITALS — BODY MASS INDEX: 24.66 KG/M2 | WEIGHT: 134 LBS | HEIGHT: 62 IN | TEMPERATURE: 97.6 F

## 2017-06-21 DIAGNOSIS — Z98.890 S/P DISCECTOMY: ICD-10-CM

## 2017-06-21 DIAGNOSIS — M48.062 LUMBAR STENOSIS WITH NEUROGENIC CLAUDICATION: ICD-10-CM

## 2017-06-21 DIAGNOSIS — M53.2X6 LUMBAR SPINE INSTABILITY: ICD-10-CM

## 2017-06-21 DIAGNOSIS — M51.26 RECURRENT HERNIATION OF LUMBAR DISC: Primary | ICD-10-CM

## 2017-06-21 DIAGNOSIS — M53.2X6 LUMBAR SPINE INSTABILITY: Primary | ICD-10-CM

## 2017-06-21 PROCEDURE — 82565 ASSAY OF CREATININE: CPT

## 2017-06-21 PROCEDURE — 99024 POSTOP FOLLOW-UP VISIT: CPT | Performed by: NEUROLOGICAL SURGERY

## 2017-06-21 PROCEDURE — 72148 MRI LUMBAR SPINE W/O DYE: CPT

## 2017-06-21 PROCEDURE — 72114 X-RAY EXAM L-S SPINE BENDING: CPT

## 2017-06-21 RX ORDER — SODIUM CHLORIDE 0.9 % (FLUSH) 0.9 %
1-10 SYRINGE (ML) INJECTION AS NEEDED
Status: CANCELLED | OUTPATIENT
Start: 2017-06-21

## 2017-06-21 RX ORDER — IBUPROFEN 200 MG
800 TABLET ORAL ONCE
Status: CANCELLED | OUTPATIENT
Start: 2017-06-21 | End: 2017-06-21

## 2017-06-21 RX ORDER — ACETAMINOPHEN 325 MG/1
1000 TABLET ORAL ONCE
Status: CANCELLED | OUTPATIENT
Start: 2017-06-21 | End: 2017-06-21

## 2017-06-21 RX ORDER — FAMOTIDINE 10 MG
20 TABLET ORAL
Status: CANCELLED | OUTPATIENT
Start: 2017-06-21

## 2017-06-21 RX ORDER — POLYETHYLENE GLYCOL 3350 17 G/17G
17 POWDER, FOR SOLUTION ORAL 2 TIMES DAILY
Qty: 250 G | Refills: 1 | Status: ON HOLD | OUTPATIENT
Start: 2017-06-21 | End: 2017-08-22

## 2017-06-21 RX ORDER — OXYCODONE HCL 10 MG/1
10 TABLET, FILM COATED, EXTENDED RELEASE ORAL ONCE
Status: CANCELLED | OUTPATIENT
Start: 2017-06-21 | End: 2017-06-21

## 2017-06-21 RX ORDER — CHLORHEXIDINE GLUCONATE 4 G/100ML
SOLUTION TOPICAL
Qty: 120 ML | Refills: 0 | Status: ON HOLD | OUTPATIENT
Start: 2017-06-21 | End: 2017-07-05

## 2017-06-21 RX ORDER — OXYCODONE HYDROCHLORIDE AND ACETAMINOPHEN 5; 325 MG/1; MG/1
1 TABLET ORAL 2 TIMES DAILY PRN
Qty: 60 TABLET | Refills: 0 | Status: SHIPPED | OUTPATIENT
Start: 2017-06-21 | End: 2017-07-08 | Stop reason: HOSPADM

## 2017-06-21 RX ORDER — VALSARTAN 320 MG/1
320 TABLET ORAL DAILY
Refills: 5 | Status: ON HOLD | COMMUNITY
Start: 2017-06-12 | End: 2017-08-16

## 2017-06-21 RX ORDER — DOCUSATE SODIUM 100 MG/1
100 CAPSULE, LIQUID FILLED ORAL 3 TIMES DAILY PRN
Qty: 45 CAPSULE | Refills: 0 | Status: ON HOLD | OUTPATIENT
Start: 2017-06-21 | End: 2017-08-22

## 2017-06-21 NOTE — PROGRESS NOTES
Patient: Paulina Johnson  : 1945    Primary Care Provider: Hernan Thompson MD    Requesting Provider: As above        History    Chief Complaint: Low back and right leg pain.    History of Present Illness: Ms. Johnson is a 71-year-old woman who is seen in follow-up.  In 2016 she underwent right L4-5 discectomy and did quite well until March of this year.  Upon returning from vacation in [place] she had severe pain in her back extending into the right leg.  MRI study revealed a large recurrent disc herniation on the right at L4-5 and on 17 she underwent redo right L4-5 discectomy.  100 some very modest improvement early on but her symptoms have progressed to become more severe.  The pain extends from her back into the right lateral calf and anterior shin.  She is worse with weightbearing.  Once again she is intolerant of medications such as Neurontin or Lyrica.  She is very limited in terms of her activities.    Review of Systems   Constitutional: Negative for activity change, appetite change, chills, diaphoresis, fatigue, fever and unexpected weight change.   HENT: Negative for congestion, dental problem, drooling, ear discharge, ear pain, facial swelling, hearing loss, mouth sores, nosebleeds, postnasal drip, rhinorrhea, sinus pressure, sneezing, sore throat, tinnitus, trouble swallowing and voice change.    Eyes: Negative for photophobia, pain, discharge, redness, itching and visual disturbance.   Respiratory: Negative for apnea, cough, choking, chest tightness, shortness of breath, wheezing and stridor.    Cardiovascular: Negative for chest pain, palpitations and leg swelling.   Gastrointestinal: Negative for abdominal distention, abdominal pain, anal bleeding, blood in stool, constipation, diarrhea, nausea, rectal pain and vomiting.   Endocrine: Negative for cold intolerance, heat intolerance, polydipsia, polyphagia and polyuria.   Genitourinary: Negative for decreased urine  "volume, difficulty urinating, dysuria, enuresis, flank pain, frequency, genital sores, hematuria and urgency.   Musculoskeletal: Positive for back pain. Negative for arthralgias, gait problem, joint swelling, myalgias, neck pain and neck stiffness.   Skin: Negative for color change, pallor, rash and wound.   Allergic/Immunologic: Negative for environmental allergies, food allergies and immunocompromised state.   Neurological: Negative for dizziness, tremors, seizures, syncope, facial asymmetry, speech difficulty, weakness, light-headedness, numbness and headaches.   Hematological: Negative for adenopathy. Does not bruise/bleed easily.   Psychiatric/Behavioral: Negative for agitation, behavioral problems, confusion, decreased concentration, dysphoric mood, hallucinations, self-injury, sleep disturbance and suicidal ideas. The patient is not nervous/anxious and is not hyperactive.        The patient's past medical history, past surgical history, family history, and social history have been reviewed at length in the electronic medical record.    Physical Exam:   Temp 97.6 °F (36.4 °C)  Ht 62\" (157.5 cm)  Wt 134 lb (60.8 kg)  BMI 24.51 kg/m2  MUSCULOSKELETAL:  Straight leg raising is positive on the right at 45°.  Edgar's Sign is negative.  ROM in back normal.  Tenderness in the back to palpation is not observed.  NEUROLOGICAL:  Strength is intact in the lower extremities to direct testing.  Muscle tone is normal throughout.  Station and gait are normal.  Sensation is diminished light touch testing in the right anterior shin and lateral calf.  Deep tendon reflexes are 1+ and symmetrical.  Coordination is intact.      Medical Decision Making    Data Review:   Follow-up MRI is performed only without contrast given some limited renal function.  Her current study reveals significant subacute Modic endplate changes at L4-5.  There is what is probably an early recurrence of disc herniation into the recess and proximal " foramen on the right at L4-5.  There is a low-grade offset of L3 on L4.  Plain flexion extension films do not reveal movement of the small offset at L3-4.  The endplates appear to be in order at L4-5.    Diagnosis:   The patient's current difficulties are probably a reflection of early recurrent disc herniation in the setting of segmental instability.    Treatment Options:   I've discussed treatment options with the patient and her .  Given the appearance of the MRI I have recommended additional decompression and fusion at L4-5.  In the meantime I'm going to see if we can set up an epidural injection or 2 to temporize.  Surgery is likely to improve her symptoms although it may not eradicate them.  The nature of the procedure as well as the potential risks, complications, limitations, and alternatives to the procedure were discussed at length with the patient and the patient has agreed to proceed with surgery.     Diagnosis Plan   1. Recurrent herniation of lumbar disc  Ambulatory Referral to Pain Management    oxyCODONE-acetaminophen (PERCOCET) 5-325 MG per tablet    polyethylene glycol (MIRALAX) powder    docusate sodium (COLACE) 100 MG capsule   2. Lumbar spine instability     3. Lumbar stenosis with neurogenic claudication             I, Dr. Dhaliwal, personally performed the services described in the documentation, as scribed in my presence, and it is both accurate and complete.  Scribed for Vick Dhaliwal MD by Robbi Dietz CMA on 06/21/2017 at 2:09 PM

## 2017-06-21 NOTE — H&P
Patient: Paulina Johnson  : 1945     Primary Care Provider: Hernan Thompson MD     Requesting Provider: As above           History     Chief Complaint: Low back and right leg pain.     History of Present Illness: Ms. Johnson is a 71-year-old woman who is seen in follow-up. In 2016 she underwent right L4-5 discectomy and did quite well until March of this year. Upon returning from vacation in [place] she had severe pain in her back extending into the right leg. MRI study revealed a large recurrent disc herniation on the right at L4-5 and on 17 she underwent redo right L4-5 discectomy. 100 some very modest improvement early on but her symptoms have progressed to become more severe. The pain extends from her back into the right lateral calf and anterior shin. She is worse with weightbearing. Once again she is intolerant of medications such as Neurontin or Lyrica. She is very limited in terms of her activities.     Review of Systems   Constitutional: Negative for activity change, appetite change, chills, diaphoresis, fatigue, fever and unexpected weight change.   HENT: Negative for congestion, dental problem, drooling, ear discharge, ear pain, facial swelling, hearing loss, mouth sores, nosebleeds, postnasal drip, rhinorrhea, sinus pressure, sneezing, sore throat, tinnitus, trouble swallowing and voice change.   Eyes: Negative for photophobia, pain, discharge, redness, itching and visual disturbance.   Respiratory: Negative for apnea, cough, choking, chest tightness, shortness of breath, wheezing and stridor.   Cardiovascular: Negative for chest pain, palpitations and leg swelling.   Gastrointestinal: Negative for abdominal distention, abdominal pain, anal bleeding, blood in stool, constipation, diarrhea, nausea, rectal pain and vomiting.   Endocrine: Negative for cold intolerance, heat intolerance, polydipsia, polyphagia and polyuria.   Genitourinary: Negative for decreased urine  "volume, difficulty urinating, dysuria, enuresis, flank pain, frequency, genital sores, hematuria and urgency.   Musculoskeletal: Positive for back pain. Negative for arthralgias, gait problem, joint swelling, myalgias, neck pain and neck stiffness.   Skin: Negative for color change, pallor, rash and wound.   Allergic/Immunologic: Negative for environmental allergies, food allergies and immunocompromised state.   Neurological: Negative for dizziness, tremors, seizures, syncope, facial asymmetry, speech difficulty, weakness, light-headedness, numbness and headaches.   Hematological: Negative for adenopathy. Does not bruise/bleed easily.   Psychiatric/Behavioral: Negative for agitation, behavioral problems, confusion, decreased concentration, dysphoric mood, hallucinations, self-injury, sleep disturbance and suicidal ideas. The patient is not nervous/anxious and is not hyperactive.         The patient's past medical history, past surgical history, family history, and social history have been reviewed at length in the electronic medical record.     Past Medical History:   Diagnosis Date   • Arthritis    • Back pain    • Depression    • Diabetes mellitus     checks sugar 3 times per day    • History of stroke     2015   • History of transfusion    • Hyperlipidemia    • Hypertension    • Skin cancer     generalized areas multiple areas   • Stroke    • Wears glasses     \"driving\"     Past Surgical History:   Procedure Laterality Date   • ANTERIOR CERVICAL DISCECTOMY  12/18/2003    ACD w/ allografting and plating C5-7 (Dr. Dhaliwal)   • COLONOSCOPY      x5   • EYE SURGERY      cataracts bilat with lens    • HEMORRHOIDECTOMY     • KIDNEY SURGERY     • LUMBAR DISCECTOMY Right 12/14/2016    Procedure: RIGHT LUMBAR DISCECTOMY L4-5;  Surgeon: Vick Dhaliwal MD;  Location: [facility] OR;  Service:    • LUMBAR DISCECTOMY Right 4/24/2017    Procedure: RIGHT RE-DO LUMBAR DISCECTOMY L4-5;  Surgeon: Vick hDaliwal MD;  Location:  [facility] " "OR;  Service:    • REPLACEMENT TOTAL KNEE Bilateral    • TIBIA FRACTURE SURGERY      right      No family history on file.  Social History     Social History   • Marital status:      Spouse name: N/A   • Number of children: N/A   • Years of education: N/A     Occupational History   • Not on file.     Social History Main Topics   • Smoking status: Never Smoker   • Smokeless tobacco: Never Used   • Alcohol use No   • Drug use: No   • Sexual activity: Not on file     Other Topics Concern   • Not on file     Social History Narrative     Allergies   Allergen Reactions   • Azithromycin Other (See Comments)     unsure   • Ciprofloxacin Other (See Comments)     unsure   • Daypro [Oxaprozin] Other (See Comments)     unsure   • Lortab [Hydrocodone-Acetaminophen] Other (See Comments)     unsure   • Penicillins Other (See Comments)     unsure       Physical Exam:   Temp 97.6 °F (36.4 °C)  Ht 62\" (157.5 cm)  Wt 134 lb (60.8 kg)  BMI 24.51 kg/m2  MUSCULOSKELETAL:  Straight leg raising is positive on the right at 45°.  Edgar's Sign is negative.  ROM in back normal.  Tenderness in the back to palpation is not observed.  NEUROLOGICAL:  Strength is intact in the lower extremities to direct testing.  Muscle tone is normal throughout.  Station and gait are normal.  Sensation is diminished light touch testing in the right anterior shin and lateral calf.  Deep tendon reflexes are 1+ and symmetrical.  Coordination is intact.        Medical Decision Making     Data Review:   Follow-up MRI is performed only without contrast given some limited renal function. Her current study reveals significant subacute Modic endplate changes at L4-5. There is what is probably an early recurrence of disc herniation into the recess and proximal foramen on the right at L4-5. There is a low-grade offset of L3 on L4. Plain flexion extension films do not reveal movement of the small offset at L3-4. The endplates appear to be in order at " L4-5.     Diagnosis:   The patient's current difficulties are probably a reflection of early recurrent disc herniation in the setting of segmental instability.     Treatment Options:   I've discussed treatment options with the patient and her . Given the appearance of the MRI I have recommended additional decompression and fusion at L4-5. In the meantime I'm going to see if we can set up an epidural injection or 2 to temporize. Surgery is likely to improve her symptoms although it may not eradicate them. The nature of the procedure as well as the potential risks, complications, limitations, and alternatives to the procedure were discussed at length with the patient and the patient has agreed to proceed with surgery.       Diagnosis Plan   1. Recurrent herniation of lumbar disc  Ambulatory Referral to Pain Management     oxyCODONE-acetaminophen (PERCOCET) 5-325 MG per tablet     polyethylene glycol (MIRALAX) powder     docusate sodium (COLACE) 100 MG capsule   2. Lumbar spine instability      3. Lumbar stenosis with neurogenic claudication

## 2017-06-22 LAB — CREAT BLDA-MCNC: 2 MG/DL (ref 0.6–1.3)

## 2017-06-30 ENCOUNTER — TELEPHONE (OUTPATIENT)
Dept: NEUROSURGERY | Facility: CLINIC | Age: 72
End: 2017-06-30

## 2017-06-30 ENCOUNTER — APPOINTMENT (OUTPATIENT)
Dept: PREADMISSION TESTING | Facility: HOSPITAL | Age: 72
End: 2017-06-30

## 2017-06-30 VITALS — BODY MASS INDEX: 25.03 KG/M2 | HEIGHT: 62 IN | WEIGHT: 136.02 LBS

## 2017-06-30 DIAGNOSIS — M53.2X6 LUMBAR SPINE INSTABILITY: ICD-10-CM

## 2017-06-30 LAB
DEPRECATED RDW RBC AUTO: 53.7 FL (ref 37–54)
ERYTHROCYTE [DISTWIDTH] IN BLOOD BY AUTOMATED COUNT: 17.2 % (ref 11.3–14.5)
HBA1C MFR BLD: 9.6 % (ref 4.8–5.6)
HCT VFR BLD AUTO: 31.4 % (ref 34.5–44)
HGB BLD-MCNC: 9.7 G/DL (ref 11.5–15.5)
MCH RBC QN AUTO: 26.5 PG (ref 27–31)
MCHC RBC AUTO-ENTMCNC: 30.9 G/DL (ref 32–36)
MCV RBC AUTO: 85.8 FL (ref 80–99)
PLATELET # BLD AUTO: 533 10*3/MM3 (ref 150–450)
PMV BLD AUTO: 8.6 FL (ref 6–12)
RBC # BLD AUTO: 3.66 10*6/MM3 (ref 3.89–5.14)
WBC NRBC COR # BLD: 10.06 10*3/MM3 (ref 3.5–10.8)

## 2017-06-30 PROCEDURE — 87081 CULTURE SCREEN ONLY: CPT | Performed by: NEUROLOGICAL SURGERY

## 2017-06-30 PROCEDURE — 85027 COMPLETE CBC AUTOMATED: CPT | Performed by: ANESTHESIOLOGY

## 2017-06-30 PROCEDURE — 36415 COLL VENOUS BLD VENIPUNCTURE: CPT

## 2017-06-30 PROCEDURE — 93005 ELECTROCARDIOGRAM TRACING: CPT

## 2017-06-30 PROCEDURE — 93010 ELECTROCARDIOGRAM REPORT: CPT | Performed by: INTERNAL MEDICINE

## 2017-06-30 PROCEDURE — 83036 HEMOGLOBIN GLYCOSYLATED A1C: CPT | Performed by: ANESTHESIOLOGY

## 2017-07-02 LAB — MRSA SPEC QL CULT: NORMAL

## 2017-07-05 ENCOUNTER — ANESTHESIA EVENT (OUTPATIENT)
Dept: PERIOP | Facility: HOSPITAL | Age: 72
End: 2017-07-05

## 2017-07-05 ENCOUNTER — ANESTHESIA (OUTPATIENT)
Dept: PERIOP | Facility: HOSPITAL | Age: 72
End: 2017-07-05

## 2017-07-05 ENCOUNTER — APPOINTMENT (OUTPATIENT)
Dept: GENERAL RADIOLOGY | Facility: HOSPITAL | Age: 72
End: 2017-07-05

## 2017-07-05 ENCOUNTER — HOSPITAL ENCOUNTER (INPATIENT)
Facility: HOSPITAL | Age: 72
LOS: 3 days | Discharge: HOME | End: 2017-07-08
Attending: NEUROLOGICAL SURGERY | Admitting: NEUROLOGICAL SURGERY

## 2017-07-05 DIAGNOSIS — M53.2X6 LUMBAR SPINE INSTABILITY: ICD-10-CM

## 2017-07-05 DIAGNOSIS — M51.26 RECURRENT HERNIATION OF LUMBAR DISC: ICD-10-CM

## 2017-07-05 DIAGNOSIS — Z74.09 IMPAIRED MOBILITY AND ADLS: ICD-10-CM

## 2017-07-05 DIAGNOSIS — Z78.9 IMPAIRED MOBILITY AND ADLS: ICD-10-CM

## 2017-07-05 DIAGNOSIS — Z74.09 IMPAIRED FUNCTIONAL MOBILITY, BALANCE, GAIT, AND ENDURANCE: Primary | ICD-10-CM

## 2017-07-05 LAB
ABO GROUP BLD: NORMAL
ABO GROUP BLD: NORMAL
BLD GP AB SCN SERPL QL: NEGATIVE
GLUCOSE BLDC GLUCOMTR-MCNC: 121 MG/DL (ref 70–130)
GLUCOSE BLDC GLUCOMTR-MCNC: 122 MG/DL (ref 70–130)
GLUCOSE BLDC GLUCOMTR-MCNC: 156 MG/DL (ref 70–130)
GLUCOSE BLDC GLUCOMTR-MCNC: 157 MG/DL (ref 70–130)
GLUCOSE BLDC GLUCOMTR-MCNC: 237 MG/DL (ref 70–130)
GLUCOSE BLDC GLUCOMTR-MCNC: 279 MG/DL (ref 70–130)
POTASSIUM BLDA-SCNC: 4.05 MMOL/L (ref 3.5–5.3)
RH BLD: POSITIVE
RH BLD: POSITIVE

## 2017-07-05 PROCEDURE — 87102 FUNGUS ISOLATION CULTURE: CPT | Performed by: NEUROLOGICAL SURGERY

## 2017-07-05 PROCEDURE — 86901 BLOOD TYPING SEROLOGIC RH(D): CPT | Performed by: ANESTHESIOLOGY

## 2017-07-05 PROCEDURE — C1713 ANCHOR/SCREW BN/BN,TIS/BN: HCPCS | Performed by: NEUROLOGICAL SURGERY

## 2017-07-05 PROCEDURE — 86900 BLOOD TYPING SEROLOGIC ABO: CPT

## 2017-07-05 PROCEDURE — 76000 FLUOROSCOPY <1 HR PHYS/QHP: CPT

## 2017-07-05 PROCEDURE — 87205 SMEAR GRAM STAIN: CPT | Performed by: NEUROLOGICAL SURGERY

## 2017-07-05 PROCEDURE — 25010000002 VANCOMYCIN PER 500 MG: Performed by: NEUROLOGICAL SURGERY

## 2017-07-05 PROCEDURE — 25010000002 NEOSTIGMINE 10 MG/10ML SOLUTION: Performed by: NURSE ANESTHETIST, CERTIFIED REGISTERED

## 2017-07-05 PROCEDURE — 87070 CULTURE OTHR SPECIMN AEROBIC: CPT | Performed by: NEUROLOGICAL SURGERY

## 2017-07-05 PROCEDURE — 0SG30AJ FUSION OF LUMBOSACRAL JOINT WITH INTERBODY FUSION DEVICE, POSTERIOR APPROACH, ANTERIOR COLUMN, OPEN APPROACH: ICD-10-PCS | Performed by: NEUROLOGICAL SURGERY

## 2017-07-05 PROCEDURE — 86900 BLOOD TYPING SEROLOGIC ABO: CPT | Performed by: ANESTHESIOLOGY

## 2017-07-05 PROCEDURE — 0SB40ZZ EXCISION OF LUMBOSACRAL DISC, OPEN APPROACH: ICD-10-PCS | Performed by: NEUROLOGICAL SURGERY

## 2017-07-05 PROCEDURE — 25010000002 HYDROMORPHONE PER 4 MG: Performed by: ANESTHESIOLOGY

## 2017-07-05 PROCEDURE — 25010000002 ONDANSETRON PER 1 MG: Performed by: NURSE ANESTHETIST, CERTIFIED REGISTERED

## 2017-07-05 PROCEDURE — 94799 UNLISTED PULMONARY SVC/PX: CPT

## 2017-07-05 PROCEDURE — 22853 INSJ BIOMECHANICAL DEVICE: CPT | Performed by: NEUROLOGICAL SURGERY

## 2017-07-05 PROCEDURE — 63710000001 INSULIN REGULAR HUMAN PER 5 UNITS: Performed by: ANESTHESIOLOGY

## 2017-07-05 PROCEDURE — 22840 INSERT SPINE FIXATION DEVICE: CPT | Performed by: NEUROLOGICAL SURGERY

## 2017-07-05 PROCEDURE — 76001 HC FLUORO GREATER THAN 1 HOUR: CPT

## 2017-07-05 PROCEDURE — 82962 GLUCOSE BLOOD TEST: CPT

## 2017-07-05 PROCEDURE — 87116 MYCOBACTERIA CULTURE: CPT | Performed by: NEUROLOGICAL SURGERY

## 2017-07-05 PROCEDURE — 87075 CULTR BACTERIA EXCEPT BLOOD: CPT | Performed by: NEUROLOGICAL SURGERY

## 2017-07-05 PROCEDURE — 87176 TISSUE HOMOGENIZATION CULTR: CPT | Performed by: NEUROLOGICAL SURGERY

## 2017-07-05 PROCEDURE — 25010000002 FENTANYL CITRATE (PF) 100 MCG/2ML SOLUTION: Performed by: NURSE ANESTHETIST, CERTIFIED REGISTERED

## 2017-07-05 PROCEDURE — 00QT0ZZ REPAIR SPINAL MENINGES, OPEN APPROACH: ICD-10-PCS | Performed by: NEUROLOGICAL SURGERY

## 2017-07-05 PROCEDURE — 25010000002 PHENYLEPHRINE PER 1 ML: Performed by: NURSE ANESTHETIST, CERTIFIED REGISTERED

## 2017-07-05 PROCEDURE — 25010000002 ALBUMIN HUMAN 5% PER 50 ML: Performed by: NURSE ANESTHETIST, CERTIFIED REGISTERED

## 2017-07-05 PROCEDURE — 25010000002 PROPOFOL 1000 MG/ML EMULSION: Performed by: NURSE ANESTHETIST, CERTIFIED REGISTERED

## 2017-07-05 PROCEDURE — 63710000001 INSULIN REGULAR HUMAN PER 5 UNITS: Performed by: NEUROLOGICAL SURGERY

## 2017-07-05 PROCEDURE — 87206 SMEAR FLUORESCENT/ACID STAI: CPT | Performed by: NEUROLOGICAL SURGERY

## 2017-07-05 PROCEDURE — 63710000001 INSULIN DETEMIR PER 5 UNITS: Performed by: NEUROLOGICAL SURGERY

## 2017-07-05 PROCEDURE — 25010000002 MIDAZOLAM PER 1 MG: Performed by: NURSE ANESTHETIST, CERTIFIED REGISTERED

## 2017-07-05 PROCEDURE — P9041 ALBUMIN (HUMAN),5%, 50ML: HCPCS | Performed by: NURSE ANESTHETIST, CERTIFIED REGISTERED

## 2017-07-05 PROCEDURE — 86850 RBC ANTIBODY SCREEN: CPT | Performed by: ANESTHESIOLOGY

## 2017-07-05 PROCEDURE — 61783 SCAN PROC SPINAL: CPT | Performed by: NEUROLOGICAL SURGERY

## 2017-07-05 PROCEDURE — 84132 ASSAY OF SERUM POTASSIUM: CPT | Performed by: NEUROLOGICAL SURGERY

## 2017-07-05 PROCEDURE — 25010000002 PROPOFOL 10 MG/ML EMULSION: Performed by: NURSE ANESTHETIST, CERTIFIED REGISTERED

## 2017-07-05 PROCEDURE — 86901 BLOOD TYPING SEROLOGIC RH(D): CPT

## 2017-07-05 PROCEDURE — 22633 ARTHRD CMBN 1NTRSPC LUMBAR: CPT | Performed by: NEUROLOGICAL SURGERY

## 2017-07-05 DEVICE — SPACER 2991022 CAPSTONE PEEK 10X22
Type: IMPLANTABLE DEVICE | Site: SPINE LUMBAR | Status: FUNCTIONAL
Brand: CAPSTONE® SPINAL SYSTEM

## 2017-07-05 DEVICE — ROD 7893035 6.35MM X 35MM PEEK ROD
Type: IMPLANTABLE DEVICE | Site: SPINE LUMBAR | Status: FUNCTIONAL
Brand: CD HORIZON® SPINAL SYSTEM

## 2017-07-05 DEVICE — DURAGEN® PLUS DURAL REGENERATION MATRIX, 2 IN X 2 IN (5 CM X 5 CM)
Type: IMPLANTABLE DEVICE | Site: SPINE LUMBAR | Status: FUNCTIONAL
Brand: DURAGEN® PLUS

## 2017-07-05 DEVICE — DBM T44145 5CC ORTHOBLEND SMALL DEFGRAFT
Type: IMPLANTABLE DEVICE | Site: SPINE LUMBAR | Status: FUNCTIONAL
Brand: GRAFTON®AND GRAFTON PLUS®DEMINERALIZED BONE MATRIX (DBM)

## 2017-07-05 RX ORDER — NICOTINE POLACRILEX 4 MG
15 LOZENGE BUCCAL
Status: DISCONTINUED | OUTPATIENT
Start: 2017-07-05 | End: 2017-07-08 | Stop reason: HOSPADM

## 2017-07-05 RX ORDER — MIDAZOLAM HYDROCHLORIDE 1 MG/ML
INJECTION INTRAMUSCULAR; INTRAVENOUS AS NEEDED
Status: DISCONTINUED | OUTPATIENT
Start: 2017-07-05 | End: 2017-07-05 | Stop reason: SURG

## 2017-07-05 RX ORDER — ONDANSETRON 2 MG/ML
4 INJECTION INTRAMUSCULAR; INTRAVENOUS ONCE AS NEEDED
Status: DISCONTINUED | OUTPATIENT
Start: 2017-07-05 | End: 2017-07-05 | Stop reason: HOSPADM

## 2017-07-05 RX ORDER — LIDOCAINE HYDROCHLORIDE 10 MG/ML
0.5 INJECTION, SOLUTION EPIDURAL; INFILTRATION; INTRACAUDAL; PERINEURAL ONCE AS NEEDED
Status: DISCONTINUED | OUTPATIENT
Start: 2017-07-05 | End: 2017-07-05 | Stop reason: HOSPADM

## 2017-07-05 RX ORDER — ATORVASTATIN CALCIUM 10 MG/1
10 TABLET, FILM COATED ORAL DAILY
Status: DISCONTINUED | OUTPATIENT
Start: 2017-07-05 | End: 2017-07-08 | Stop reason: HOSPADM

## 2017-07-05 RX ORDER — IBUPROFEN 800 MG/1
800 TABLET ORAL ONCE
Status: COMPLETED | OUTPATIENT
Start: 2017-07-05 | End: 2017-07-05

## 2017-07-05 RX ORDER — ACETAMINOPHEN 500 MG
1000 TABLET ORAL ONCE
Status: COMPLETED | OUTPATIENT
Start: 2017-07-05 | End: 2017-07-05

## 2017-07-05 RX ORDER — ALBUMIN, HUMAN INJ 5% 5 %
SOLUTION INTRAVENOUS CONTINUOUS PRN
Status: DISCONTINUED | OUTPATIENT
Start: 2017-07-05 | End: 2017-07-05 | Stop reason: SURG

## 2017-07-05 RX ORDER — BUPIVACAINE HYDROCHLORIDE AND EPINEPHRINE 2.5; 5 MG/ML; UG/ML
INJECTION, SOLUTION EPIDURAL; INFILTRATION; INTRACAUDAL; PERINEURAL AS NEEDED
Status: DISCONTINUED | OUTPATIENT
Start: 2017-07-05 | End: 2017-07-05 | Stop reason: HOSPADM

## 2017-07-05 RX ORDER — SODIUM CHLORIDE, SODIUM LACTATE, POTASSIUM CHLORIDE, CALCIUM CHLORIDE 600; 310; 30; 20 MG/100ML; MG/100ML; MG/100ML; MG/100ML
9 INJECTION, SOLUTION INTRAVENOUS CONTINUOUS
Status: DISCONTINUED | OUTPATIENT
Start: 2017-07-05 | End: 2017-07-05

## 2017-07-05 RX ORDER — BISACODYL 10 MG
10 SUPPOSITORY, RECTAL RECTAL DAILY PRN
Status: DISCONTINUED | OUTPATIENT
Start: 2017-07-05 | End: 2017-07-08 | Stop reason: HOSPADM

## 2017-07-05 RX ORDER — FUROSEMIDE 40 MG/1
40 TABLET ORAL DAILY
Status: DISCONTINUED | OUTPATIENT
Start: 2017-07-05 | End: 2017-07-08 | Stop reason: HOSPADM

## 2017-07-05 RX ORDER — OXYCODONE HYDROCHLORIDE 15 MG/1
15 TABLET, FILM COATED, EXTENDED RELEASE ORAL EVERY 8 HOURS SCHEDULED
Status: DISCONTINUED | OUTPATIENT
Start: 2017-07-05 | End: 2017-07-06

## 2017-07-05 RX ORDER — ATRACURIUM BESYLATE 10 MG/ML
INJECTION, SOLUTION INTRAVENOUS AS NEEDED
Status: DISCONTINUED | OUTPATIENT
Start: 2017-07-05 | End: 2017-07-05 | Stop reason: SURG

## 2017-07-05 RX ORDER — FAMOTIDINE 10 MG/ML
20 INJECTION, SOLUTION INTRAVENOUS 2 TIMES DAILY
Status: DISCONTINUED | OUTPATIENT
Start: 2017-07-05 | End: 2017-07-08 | Stop reason: HOSPADM

## 2017-07-05 RX ORDER — SENNA AND DOCUSATE SODIUM 50; 8.6 MG/1; MG/1
1 TABLET, FILM COATED ORAL NIGHTLY PRN
Status: DISCONTINUED | OUTPATIENT
Start: 2017-07-05 | End: 2017-07-08 | Stop reason: HOSPADM

## 2017-07-05 RX ORDER — VANCOMYCIN HYDROCHLORIDE 1 G/200ML
15 INJECTION, SOLUTION INTRAVENOUS EVERY 12 HOURS
Status: COMPLETED | OUTPATIENT
Start: 2017-07-05 | End: 2017-07-05

## 2017-07-05 RX ORDER — IBUPROFEN 600 MG/1
600 TABLET ORAL 3 TIMES DAILY
Status: DISCONTINUED | OUTPATIENT
Start: 2017-07-05 | End: 2017-07-07

## 2017-07-05 RX ORDER — ACETAMINOPHEN 325 MG/1
650 TABLET ORAL 3 TIMES DAILY
Status: DISCONTINUED | OUTPATIENT
Start: 2017-07-05 | End: 2017-07-08 | Stop reason: HOSPADM

## 2017-07-05 RX ORDER — DULOXETIN HYDROCHLORIDE 30 MG/1
30 CAPSULE, DELAYED RELEASE ORAL DAILY
Status: DISCONTINUED | OUTPATIENT
Start: 2017-07-05 | End: 2017-07-08 | Stop reason: HOSPADM

## 2017-07-05 RX ORDER — FAMOTIDINE 20 MG/1
20 TABLET, FILM COATED ORAL 2 TIMES DAILY
Status: DISCONTINUED | OUTPATIENT
Start: 2017-07-05 | End: 2017-07-08 | Stop reason: HOSPADM

## 2017-07-05 RX ORDER — MORPHINE SULFATE 4 MG/ML
4 INJECTION, SOLUTION INTRAMUSCULAR; INTRAVENOUS EVERY 4 HOURS PRN
Status: DISCONTINUED | OUTPATIENT
Start: 2017-07-05 | End: 2017-07-07

## 2017-07-05 RX ORDER — FAMOTIDINE 20 MG/1
20 TABLET, FILM COATED ORAL ONCE
Status: DISCONTINUED | OUTPATIENT
Start: 2017-07-05 | End: 2017-07-05

## 2017-07-05 RX ORDER — MAGNESIUM HYDROXIDE 1200 MG/15ML
LIQUID ORAL AS NEEDED
Status: DISCONTINUED | OUTPATIENT
Start: 2017-07-05 | End: 2017-07-05 | Stop reason: HOSPADM

## 2017-07-05 RX ORDER — SODIUM CHLORIDE 0.9 % (FLUSH) 0.9 %
1-10 SYRINGE (ML) INJECTION AS NEEDED
Status: DISCONTINUED | OUTPATIENT
Start: 2017-07-05 | End: 2017-07-05

## 2017-07-05 RX ORDER — SODIUM CHLORIDE, SODIUM LACTATE, POTASSIUM CHLORIDE, CALCIUM CHLORIDE 600; 310; 30; 20 MG/100ML; MG/100ML; MG/100ML; MG/100ML
9 INJECTION, SOLUTION INTRAVENOUS CONTINUOUS
Status: DISCONTINUED | OUTPATIENT
Start: 2017-07-05 | End: 2017-07-07

## 2017-07-05 RX ORDER — FENTANYL CITRATE 50 UG/ML
50 INJECTION, SOLUTION INTRAMUSCULAR; INTRAVENOUS
Status: DISCONTINUED | OUTPATIENT
Start: 2017-07-05 | End: 2017-07-05 | Stop reason: HOSPADM

## 2017-07-05 RX ORDER — OXYCODONE HYDROCHLORIDE AND ACETAMINOPHEN 5; 325 MG/1; MG/1
1 TABLET ORAL EVERY 6 HOURS PRN
Status: DISCONTINUED | OUTPATIENT
Start: 2017-07-05 | End: 2017-07-07

## 2017-07-05 RX ORDER — LIDOCAINE HYDROCHLORIDE 10 MG/ML
0.2 INJECTION, SOLUTION INFILTRATION; PERINEURAL ONCE
Status: COMPLETED | OUTPATIENT
Start: 2017-07-05 | End: 2017-07-05

## 2017-07-05 RX ORDER — PROPOFOL 10 MG/ML
VIAL (ML) INTRAVENOUS AS NEEDED
Status: DISCONTINUED | OUTPATIENT
Start: 2017-07-05 | End: 2017-07-05 | Stop reason: SURG

## 2017-07-05 RX ORDER — LIDOCAINE HYDROCHLORIDE 20 MG/ML
INJECTION, SOLUTION INFILTRATION; PERINEURAL AS NEEDED
Status: DISCONTINUED | OUTPATIENT
Start: 2017-07-05 | End: 2017-07-05 | Stop reason: SURG

## 2017-07-05 RX ORDER — GLYCOPYRROLATE 0.2 MG/ML
INJECTION INTRAMUSCULAR; INTRAVENOUS AS NEEDED
Status: DISCONTINUED | OUTPATIENT
Start: 2017-07-05 | End: 2017-07-05 | Stop reason: SURG

## 2017-07-05 RX ORDER — ONDANSETRON 2 MG/ML
4 INJECTION INTRAMUSCULAR; INTRAVENOUS EVERY 6 HOURS PRN
Status: DISCONTINUED | OUTPATIENT
Start: 2017-07-05 | End: 2017-07-08 | Stop reason: HOSPADM

## 2017-07-05 RX ORDER — ONDANSETRON 2 MG/ML
INJECTION INTRAMUSCULAR; INTRAVENOUS AS NEEDED
Status: DISCONTINUED | OUTPATIENT
Start: 2017-07-05 | End: 2017-07-05 | Stop reason: SURG

## 2017-07-05 RX ORDER — FENTANYL CITRATE 50 UG/ML
INJECTION, SOLUTION INTRAMUSCULAR; INTRAVENOUS AS NEEDED
Status: DISCONTINUED | OUTPATIENT
Start: 2017-07-05 | End: 2017-07-05 | Stop reason: SURG

## 2017-07-05 RX ORDER — NEOSTIGMINE METHYLSULFATE 1 MG/ML
INJECTION, SOLUTION INTRAVENOUS AS NEEDED
Status: DISCONTINUED | OUTPATIENT
Start: 2017-07-05 | End: 2017-07-05 | Stop reason: SURG

## 2017-07-05 RX ORDER — NALOXONE HCL 0.4 MG/ML
0.4 VIAL (ML) INJECTION
Status: DISCONTINUED | OUTPATIENT
Start: 2017-07-05 | End: 2017-07-08 | Stop reason: HOSPADM

## 2017-07-05 RX ORDER — LEVOTHYROXINE SODIUM 0.05 MG/1
50 TABLET ORAL DAILY
Status: DISCONTINUED | OUTPATIENT
Start: 2017-07-05 | End: 2017-07-08 | Stop reason: HOSPADM

## 2017-07-05 RX ORDER — ONDANSETRON 4 MG/1
4 TABLET, FILM COATED ORAL EVERY 6 HOURS PRN
Status: DISCONTINUED | OUTPATIENT
Start: 2017-07-05 | End: 2017-07-08 | Stop reason: HOSPADM

## 2017-07-05 RX ORDER — POLYETHYLENE GLYCOL 3350 17 G/17G
17 POWDER, FOR SOLUTION ORAL 2 TIMES DAILY
Status: DISCONTINUED | OUTPATIENT
Start: 2017-07-05 | End: 2017-07-08 | Stop reason: HOSPADM

## 2017-07-05 RX ORDER — DIPHENHYDRAMINE HCL 25 MG
25 CAPSULE ORAL NIGHTLY PRN
Status: DISCONTINUED | OUTPATIENT
Start: 2017-07-05 | End: 2017-07-08 | Stop reason: HOSPADM

## 2017-07-05 RX ORDER — FAMOTIDINE 20 MG/1
20 TABLET, FILM COATED ORAL
Status: COMPLETED | OUTPATIENT
Start: 2017-07-05 | End: 2017-07-05

## 2017-07-05 RX ORDER — FAMOTIDINE 10 MG/ML
20 INJECTION, SOLUTION INTRAVENOUS ONCE
Status: DISCONTINUED | OUTPATIENT
Start: 2017-07-05 | End: 2017-07-05

## 2017-07-05 RX ORDER — SODIUM CHLORIDE 0.9 % (FLUSH) 0.9 %
1-10 SYRINGE (ML) INJECTION AS NEEDED
Status: DISCONTINUED | OUTPATIENT
Start: 2017-07-05 | End: 2017-07-08 | Stop reason: HOSPADM

## 2017-07-05 RX ORDER — OXYCODONE HCL 10 MG/1
10 TABLET, FILM COATED, EXTENDED RELEASE ORAL ONCE
Status: COMPLETED | OUTPATIENT
Start: 2017-07-05 | End: 2017-07-05

## 2017-07-05 RX ORDER — VALSARTAN 160 MG/1
320 TABLET ORAL DAILY
Status: DISCONTINUED | OUTPATIENT
Start: 2017-07-05 | End: 2017-07-08 | Stop reason: HOSPADM

## 2017-07-05 RX ORDER — HYDROMORPHONE HYDROCHLORIDE 1 MG/ML
0.5 INJECTION, SOLUTION INTRAMUSCULAR; INTRAVENOUS; SUBCUTANEOUS
Status: DISCONTINUED | OUTPATIENT
Start: 2017-07-05 | End: 2017-07-05 | Stop reason: HOSPADM

## 2017-07-05 RX ORDER — ASPIRIN 81 MG/1
81 TABLET ORAL DAILY
Status: DISCONTINUED | OUTPATIENT
Start: 2017-07-05 | End: 2017-07-08 | Stop reason: HOSPADM

## 2017-07-05 RX ORDER — DOCUSATE SODIUM 100 MG/1
100 CAPSULE, LIQUID FILLED ORAL 2 TIMES DAILY PRN
Status: DISCONTINUED | OUTPATIENT
Start: 2017-07-05 | End: 2017-07-08 | Stop reason: HOSPADM

## 2017-07-05 RX ORDER — VANCOMYCIN HYDROCHLORIDE 1 G/200ML
15 INJECTION, SOLUTION INTRAVENOUS ONCE
Status: COMPLETED | OUTPATIENT
Start: 2017-07-05 | End: 2017-07-05

## 2017-07-05 RX ORDER — DEXTROSE MONOHYDRATE 25 G/50ML
25 INJECTION, SOLUTION INTRAVENOUS
Status: DISCONTINUED | OUTPATIENT
Start: 2017-07-05 | End: 2017-07-08 | Stop reason: HOSPADM

## 2017-07-05 RX ORDER — SODIUM CHLORIDE, SODIUM LACTATE, POTASSIUM CHLORIDE, CALCIUM CHLORIDE 600; 310; 30; 20 MG/100ML; MG/100ML; MG/100ML; MG/100ML
90 INJECTION, SOLUTION INTRAVENOUS CONTINUOUS
Status: DISCONTINUED | OUTPATIENT
Start: 2017-07-05 | End: 2017-07-06

## 2017-07-05 RX ADMIN — FAMOTIDINE 20 MG: 20 TABLET ORAL at 06:32

## 2017-07-05 RX ADMIN — MIDAZOLAM HYDROCHLORIDE 0.5 MG: 1 INJECTION, SOLUTION INTRAMUSCULAR; INTRAVENOUS at 07:08

## 2017-07-05 RX ADMIN — VANCOMYCIN HYDROCHLORIDE 1000 MG: 1 INJECTION, SOLUTION INTRAVENOUS at 06:41

## 2017-07-05 RX ADMIN — VANCOMYCIN HYDROCHLORIDE 1 G: 1 INJECTION, POWDER, LYOPHILIZED, FOR SOLUTION INTRAVENOUS at 08:48

## 2017-07-05 RX ADMIN — VANCOMYCIN HYDROCHLORIDE 1000 MG: 1 INJECTION, SOLUTION INTRAVENOUS at 20:18

## 2017-07-05 RX ADMIN — ACETAMINOPHEN 650 MG: 325 TABLET, FILM COATED ORAL at 18:05

## 2017-07-05 RX ADMIN — HYDROMORPHONE HYDROCHLORIDE 0.5 MG: 1 INJECTION, SOLUTION INTRAMUSCULAR; INTRAVENOUS; SUBCUTANEOUS at 11:20

## 2017-07-05 RX ADMIN — ASPIRIN 81 MG: 81 TABLET, COATED ORAL at 13:56

## 2017-07-05 RX ADMIN — OXYCODONE HYDROCHLORIDE 15 MG: 15 TABLET, FILM COATED, EXTENDED RELEASE ORAL at 14:14

## 2017-07-05 RX ADMIN — FAMOTIDINE 20 MG: 20 TABLET ORAL at 13:56

## 2017-07-05 RX ADMIN — INSULIN HUMAN 4 UNITS: 100 INJECTION, SOLUTION PARENTERAL at 13:54

## 2017-07-05 RX ADMIN — ATRACURIUM BESYLATE 10 MG: 10 INJECTION, SOLUTION INTRAVENOUS at 08:51

## 2017-07-05 RX ADMIN — METFORMIN HYDROCHLORIDE 850 MG: 850 TABLET, FILM COATED ORAL at 18:04

## 2017-07-05 RX ADMIN — NEOSTIGMINE METHYLSULFATE 2.5 MG: 1 INJECTION, SOLUTION INTRAVENOUS at 10:08

## 2017-07-05 RX ADMIN — OXYCODONE HYDROCHLORIDE 10 MG: 10 TABLET, FILM COATED, EXTENDED RELEASE ORAL at 07:02

## 2017-07-05 RX ADMIN — FENTANYL CITRATE 25 MCG: 50 INJECTION, SOLUTION INTRAMUSCULAR; INTRAVENOUS at 10:16

## 2017-07-05 RX ADMIN — OXYCODONE HYDROCHLORIDE 15 MG: 15 TABLET, FILM COATED, EXTENDED RELEASE ORAL at 23:48

## 2017-07-05 RX ADMIN — INSULIN HUMAN 2 UNITS: 100 INJECTION, SOLUTION PARENTERAL at 18:04

## 2017-07-05 RX ADMIN — INSULIN HUMAN 3 UNITS: 100 INJECTION, SOLUTION PARENTERAL at 10:45

## 2017-07-05 RX ADMIN — LIDOCAINE HYDROCHLORIDE 30 MG: 20 INJECTION, SOLUTION INFILTRATION; PERINEURAL at 07:11

## 2017-07-05 RX ADMIN — SODIUM CHLORIDE, POTASSIUM CHLORIDE, SODIUM LACTATE AND CALCIUM CHLORIDE 9 ML/HR: 600; 310; 30; 20 INJECTION, SOLUTION INTRAVENOUS at 06:17

## 2017-07-05 RX ADMIN — ATRACURIUM BESYLATE 30 MG: 10 INJECTION, SOLUTION INTRAVENOUS at 07:12

## 2017-07-05 RX ADMIN — ATRACURIUM BESYLATE 10 MG: 10 INJECTION, SOLUTION INTRAVENOUS at 09:43

## 2017-07-05 RX ADMIN — IBUPROFEN 800 MG: 800 TABLET, FILM COATED ORAL at 07:02

## 2017-07-05 RX ADMIN — OXYCODONE AND ACETAMINOPHEN 1 TABLET: 5; 325 TABLET ORAL at 20:19

## 2017-07-05 RX ADMIN — INSULIN DETEMIR 16 UNITS: 100 INJECTION, SOLUTION SUBCUTANEOUS at 13:57

## 2017-07-05 RX ADMIN — PROPOFOL 25 MCG/KG/MIN: 10 INJECTION, EMULSION INTRAVENOUS at 07:20

## 2017-07-05 RX ADMIN — PHENYLEPHRINE HYDROCHLORIDE 1 MCG/KG/MIN: 10 INJECTION INTRAVENOUS at 07:35

## 2017-07-05 RX ADMIN — FENTANYL CITRATE 25 MCG: 50 INJECTION, SOLUTION INTRAMUSCULAR; INTRAVENOUS at 10:12

## 2017-07-05 RX ADMIN — Medication 1 TABLET: at 20:18

## 2017-07-05 RX ADMIN — ONDANSETRON 4 MG: 2 INJECTION INTRAMUSCULAR; INTRAVENOUS at 09:44

## 2017-07-05 RX ADMIN — ATRACURIUM BESYLATE 10 MG: 10 INJECTION, SOLUTION INTRAVENOUS at 08:20

## 2017-07-05 RX ADMIN — VALSARTAN 320 MG: 160 TABLET, FILM COATED ORAL at 14:14

## 2017-07-05 RX ADMIN — FAMOTIDINE 20 MG: 20 TABLET ORAL at 18:05

## 2017-07-05 RX ADMIN — ATRACURIUM BESYLATE 10 MG: 10 INJECTION, SOLUTION INTRAVENOUS at 09:14

## 2017-07-05 RX ADMIN — IBUPROFEN 600 MG: 600 TABLET, FILM COATED ORAL at 18:05

## 2017-07-05 RX ADMIN — ATORVASTATIN CALCIUM 10 MG: 10 TABLET, FILM COATED ORAL at 13:55

## 2017-07-05 RX ADMIN — FUROSEMIDE 40 MG: 40 TABLET ORAL at 13:55

## 2017-07-05 RX ADMIN — PROPOFOL 125 MG: 10 INJECTION, EMULSION INTRAVENOUS at 07:12

## 2017-07-05 RX ADMIN — HYDROMORPHONE HYDROCHLORIDE 0.5 MG: 1 INJECTION, SOLUTION INTRAMUSCULAR; INTRAVENOUS; SUBCUTANEOUS at 10:05

## 2017-07-05 RX ADMIN — LIDOCAINE HYDROCHLORIDE 0.2 ML: 10 INJECTION, SOLUTION EPIDURAL; INFILTRATION; INTRACAUDAL; PERINEURAL at 06:17

## 2017-07-05 RX ADMIN — ACETAMINOPHEN 975 MG: 325 TABLET ORAL at 06:32

## 2017-07-05 RX ADMIN — FENTANYL CITRATE 50 MCG: 50 INJECTION, SOLUTION INTRAMUSCULAR; INTRAVENOUS at 10:37

## 2017-07-05 RX ADMIN — ALBUMIN HUMAN: 0.05 INJECTION, SOLUTION INTRAVENOUS at 08:25

## 2017-07-05 RX ADMIN — ROBINUL 0.4 MG: 0.2 INJECTION INTRAMUSCULAR; INTRAVENOUS at 10:08

## 2017-07-05 RX ADMIN — LEVOTHYROXINE SODIUM 50 MCG: 50 TABLET ORAL at 13:56

## 2017-07-05 RX ADMIN — ATRACURIUM BESYLATE 10 MG: 10 INJECTION, SOLUTION INTRAVENOUS at 07:45

## 2017-07-05 NOTE — PLAN OF CARE
Problem: Patient Care Overview (Adult)  Goal: Plan of Care Review  Outcome: Ongoing (interventions implemented as appropriate)  Goal: Adult Individualization and Mutuality  Outcome: Ongoing (interventions implemented as appropriate)  Goal: Discharge Needs Assessment  Outcome: Ongoing (interventions implemented as appropriate)    Problem: Perioperative Period (Adult)  Goal: Signs and Symptoms of Listed Potential Problems Will be Absent or Manageable (Perioperative Period)  Outcome: Ongoing (interventions implemented as appropriate)    Problem: Fall Risk (Adult)  Goal: Identify Related Risk Factors and Signs and Symptoms  Outcome: Ongoing (interventions implemented as appropriate)  Goal: Absence of Falls  Outcome: Ongoing (interventions implemented as appropriate)

## 2017-07-05 NOTE — H&P
"Pre-Op H&P (See Recent Office Note Attached)    Chief complaint: chronic back pain and radiculopathy    HPI:    Patient is a 71 y.o. female who presents with chronic back pain with radiculopathy    Review of Systems:  General ROS:  no fever, chills, rashes, No change since last office visit  Cardiovascular ROS: no chest pain or dyspnea on exertion  Respiratory ROS: no cough, shortness of breath, or wheezing    Immunization History:   Influenza:  yes  Pneumococcal:  yes  Tetanus:  yes    Vital Signs:  /72 (BP Location: Right arm, Patient Position: Lying)  Pulse 94  Temp 97.5 °F (36.4 °C) (Temporal Artery )   Resp 16  Ht 62\" (157.5 cm)  Wt 136 lb (61.7 kg)  SpO2 100%  BMI 24.87 kg/m2    Physical Exam:    CV:  S1S2 regular rate and rhythm, no murmur               Resp:  Clear to auscultation; respirations regular, even and unlabored    Results Review:    I reviewed the patient's new clinical results.    Cancer Staging (if applicable)  Cancer Patient: __ yes __no __unknown; If yes, clinical stage T:__ N:__M:__, stage group or __N/A    Assessment/Plan:Chronic back pain with radiculopathy  Lumbar decopression and fusion with pedicle screw stabilization      ENRRIQUE Dobbs  7/5/2017 7:02 AM      "

## 2017-07-05 NOTE — ANESTHESIA POSTPROCEDURE EVALUATION
Patient: Paulina Johnson    Procedure Summary     Date Anesthesia Start Anesthesia Stop Room / Location    07/05/17 0708 1040 [facility]       Procedure Diagnosis Surgeon Provider    LUMBAR FUSION DECOMPRESSON WITH PEDICLE SCREWS L4-5 TLIF O arm (N/A Spine Lumbar) Lumbar spine instability  (Lumbar spine instability [M53.2X6]) MD Casey Rodriguez MD          Anesthesia Type: general  Last vitals  /58 (07/05/17 1035)    Temp 98.2 °F (36.8 °C) (07/05/17 1035)    Pulse 100 (07/05/17 1035)   Resp 14 (07/05/17 1035)    SpO2 100 % (07/05/17 1035)      Post Anesthesia Care and Evaluation    Patient location during evaluation: PACU  Patient participation: complete - patient participated  Level of consciousness: awake and alert  Pain score: 0  Pain management: adequate  Airway patency: patent  Anesthetic complications: No anesthetic complications  PONV Status: none  Cardiovascular status: hemodynamically stable and acceptable  Respiratory status: nonlabored ventilation, acceptable and nasal cannula  Hydration status: acceptable

## 2017-07-05 NOTE — PERIOPERATIVE NURSING NOTE
IV in the right AC not present upon admission.  Unable to document discharge date and time as I was uninvolved in the admission.

## 2017-07-05 NOTE — PERIOPERATIVE NURSING NOTE
Patient states had blisters on lower outer lip and tongue after last surgery in February.  Unsure as to what they were caused by, just wanted to make us aware of findings to see if there are any measures we can use to decrease risk for blisters with this surgical procedure.

## 2017-07-05 NOTE — ANESTHESIA PREPROCEDURE EVALUATION
Anesthesia Evaluation     Patient summary reviewed and Nursing notes reviewed   history of anesthetic complications (Pt states mouth/lips were blistered following previous surgery): PONV  NPO Solid Status: > 8 hours  NPO Liquid Status: > 8 hours     Airway   Mallampati: II  TM distance: >3 FB  Neck ROM: full  no difficulty expected  Dental - normal exam     Pulmonary - negative pulmonary ROS and normal exam    breath sounds clear to auscultation  Cardiovascular - normal exam    ECG reviewed  PT is on anticoagulation therapy  Rhythm: regular  Rate: normal    (+) hypertension,       Neuro/Psych  (+) CVA ('15,'16 - residual right sided weakness),    GI/Hepatic/Renal/Endo    (+)  diabetes mellitus type 2 using insulin, hypothyroidism,     Musculoskeletal     (+) back pain,   Abdominal    Substance History      OB/GYN          Other   (+) arthritis   history of cancer (skin)                                    Anesthesia Plan    ASA 3     general     intravenous induction   Anesthetic plan and risks discussed with patient.    Plan discussed with CRNA.

## 2017-07-05 NOTE — ANESTHESIA PROCEDURE NOTES
Airway  Urgency: elective    Airway not difficult    General Information and Staff    Patient location during procedure: OR    Indications and Patient Condition  Indications for airway management: airway protection    Preoxygenated: yes  Mask difficulty assessment: 1 - vent by mask    Final Airway Details  Final airway type: endotracheal airway      Successful airway: ETT  Cuffed: yes   Facilitating devices/methods: intubating stylet  Endotracheal tube insertion site: oral  Blade: Garza  Blade size: #2  ETT size: 7.0 mm  Placement verified by: chest auscultation and capnometry   Measured from: lips  ETT to lips (cm): 21  Number of attempts at approach: 1

## 2017-07-05 NOTE — OP NOTE
NEUROSURGICAL OPERATIVE NOTE        PREOPERATIVE DIAGNOSIS:    1.  Third time disc herniation right L4-5  2.  L4-5 segmental instability      POSTOPERATIVE DIAGNOSIS:  Same      PROCEDURE:  1.  Arthrodesis interbody-type L4-5  2.  Bilateral L4-5 laminotomies, partial facetectomies, foraminotomies  3.  Bilateral L4-5 discectomy with capstone cage placement bilaterally (redo on the right)  4.  Nonsegmental pedicle screw fixation L4-5 using PEEK rods  5.  Extensive neural lysis  6.  Use of Routt and local autograft  7.  Stealth frameless stereotaxy used in conjunction with O-arm imaging.      SURGEON:  Vick Dhaliwal M.D.      ASSISTANT:  Dee Flores PA-C      ANESTHESIA:  General      ESTIMATED BLOOD LOSS:  100 mL      SPECIMEN:  Cultures      DRAINS:  Hemovac drain      COMPLICATIONS:  Dural rent, right L4-5      CLINICAL NOTE:  The patient is a 71-year-old woman who has undergone 2 prior discectomies on the right at L4-5.  She now presents with a third time disc herniation on the right at L4-5 and is felt to have segmental instability.  As such, she presents for redo discectomy with fusion and stabilization. The nature of the procedure as well as the potential risks, complications, limitations, and alternatives to the procedure were discussed at length with the patient and the patient has agreed to proceed with surgery.        TECHNICAL NOTE:  The patient was brought to the operating room and while on her cart general anesthesia was achieved.  She was then turned prone onto the Martin table.  Special care was ensured to protect pressure points.  Her low back was prepared and draped in usual fashion.  Her previous incision was opened and extended upward and downward.  Posterior spinal elements were exposed.  Reference frame was affixed with spinous process and O-arm imaging ensued.  These images were downloaded into the Stealth station.  Using stealth frameless stereotaxy into the pedicle screw holes at L4 and  "L5 were marked, drilled, tapped.  6.5 mm diameter screws were used throughout.  50 mm length screws were used bilaterally at L4 and 45 mm length screws were used bilaterally at L5.  Generous bilateral laminotomies and medial facetectomies and foraminotomies were pursued.  Substantial granulation tissue was taken down on the right at L4-5.  In doing so a dural rent occurred.  This was closed in interrupted fashion with a 6-0 Prolene suture.  Good decompression of the nerve roots was accomplished.  Granulation tissue and recurrent disc herniation extended laterally and upward and impinged on the right L4 nerve root.  This was removed.  The C-arm was brought into use and discectomy was performed on each side at L4-5.  After preparing the disc spaces ultimately 10 x 22 mm capstone cages were impacted into the disc space on each side.  Prior to placing the second cage some of the fusion substrate consisting of Pool and local autograft was placed anteriorly and in the midline.  PEEK rods measuring 35 mm on the right and 40 mm on the left were applied.  Set screws were placed.  A Hemovac drain was brought in through a separate stab incision and left in the epidural space.  DuraGen was placed over the site of dural repair on the right.  Vancomycin powder was sprinkled in the depths and more superficially as the wound was closed.  Given the \"ratty\" appearance of the endplates on the patient's imaging I did do intraoperative cultures of the disc space.  The fascia and muscle were reapproximated in interrupted fashion with 0 Vicryl suture.  Subcutaneous tissues were closed in layers with 2-0 Vicryl followed by 3-0 Vicryl suture.  The skin was closed in a running locked fashion with a 3-0 nylon suture.  A sterile dressing was applied.  The patient was rolled onto her cart, extubated, and taken to recovery room in satisfactory condition.        Vick Dhaliwal M.D.  "

## 2017-07-06 LAB
ABO GROUP BLD: NORMAL
BLD GP AB SCN SERPL QL: NEGATIVE
GLUCOSE BLDC GLUCOMTR-MCNC: 114 MG/DL (ref 70–130)
GLUCOSE BLDC GLUCOMTR-MCNC: 212 MG/DL (ref 70–130)
GLUCOSE BLDC GLUCOMTR-MCNC: 250 MG/DL (ref 70–130)
GLUCOSE BLDC GLUCOMTR-MCNC: 279 MG/DL (ref 70–130)
GLUCOSE BLDC GLUCOMTR-MCNC: 363 MG/DL (ref 70–130)
HCT VFR BLD AUTO: 26.4 % (ref 34.5–44)
HGB BLD-MCNC: 8.1 G/DL (ref 11.5–15.5)
RH BLD: POSITIVE

## 2017-07-06 PROCEDURE — 86900 BLOOD TYPING SEROLOGIC ABO: CPT | Performed by: NEUROLOGICAL SURGERY

## 2017-07-06 PROCEDURE — 97166 OT EVAL MOD COMPLEX 45 MIN: CPT | Performed by: OCCUPATIONAL THERAPIST

## 2017-07-06 PROCEDURE — 86901 BLOOD TYPING SEROLOGIC RH(D): CPT | Performed by: NEUROLOGICAL SURGERY

## 2017-07-06 PROCEDURE — 86850 RBC ANTIBODY SCREEN: CPT | Performed by: NEUROLOGICAL SURGERY

## 2017-07-06 PROCEDURE — 82962 GLUCOSE BLOOD TEST: CPT

## 2017-07-06 PROCEDURE — 85014 HEMATOCRIT: CPT | Performed by: NEUROLOGICAL SURGERY

## 2017-07-06 PROCEDURE — 25010000002 ONDANSETRON PER 1 MG: Performed by: NEUROLOGICAL SURGERY

## 2017-07-06 PROCEDURE — 97162 PT EVAL MOD COMPLEX 30 MIN: CPT

## 2017-07-06 PROCEDURE — 85018 HEMOGLOBIN: CPT | Performed by: NEUROLOGICAL SURGERY

## 2017-07-06 PROCEDURE — 63710000001 INSULIN DETEMIR PER 5 UNITS: Performed by: NEUROLOGICAL SURGERY

## 2017-07-06 RX ADMIN — POLYETHYLENE GLYCOL 3350 17 G: 17 POWDER, FOR SOLUTION ORAL at 19:20

## 2017-07-06 RX ADMIN — POLYETHYLENE GLYCOL 3350 17 G: 17 POWDER, FOR SOLUTION ORAL at 08:58

## 2017-07-06 RX ADMIN — ONDANSETRON 4 MG: 2 INJECTION INTRAMUSCULAR; INTRAVENOUS at 08:55

## 2017-07-06 RX ADMIN — FAMOTIDINE 20 MG: 20 TABLET ORAL at 08:58

## 2017-07-06 RX ADMIN — METFORMIN HYDROCHLORIDE 850 MG: 850 TABLET, FILM COATED ORAL at 08:58

## 2017-07-06 RX ADMIN — VALSARTAN 320 MG: 160 TABLET, FILM COATED ORAL at 08:58

## 2017-07-06 RX ADMIN — FAMOTIDINE 20 MG: 20 TABLET ORAL at 19:22

## 2017-07-06 RX ADMIN — INSULIN HUMAN 6 UNITS: 100 INJECTION, SOLUTION PARENTERAL at 12:06

## 2017-07-06 RX ADMIN — ACETAMINOPHEN 650 MG: 325 TABLET, FILM COATED ORAL at 19:20

## 2017-07-06 RX ADMIN — IBUPROFEN 600 MG: 600 TABLET, FILM COATED ORAL at 08:58

## 2017-07-06 RX ADMIN — SODIUM CHLORIDE, POTASSIUM CHLORIDE, SODIUM LACTATE AND CALCIUM CHLORIDE 90 ML/HR: 600; 310; 30; 20 INJECTION, SOLUTION INTRAVENOUS at 06:54

## 2017-07-06 RX ADMIN — IBUPROFEN 600 MG: 600 TABLET, FILM COATED ORAL at 19:21

## 2017-07-06 RX ADMIN — ACETAMINOPHEN 650 MG: 325 TABLET, FILM COATED ORAL at 08:58

## 2017-07-06 RX ADMIN — DULOXETINE HYDROCHLORIDE 30 MG: 30 CAPSULE, DELAYED RELEASE ORAL at 08:58

## 2017-07-06 RX ADMIN — METFORMIN HYDROCHLORIDE 850 MG: 850 TABLET, FILM COATED ORAL at 19:22

## 2017-07-06 RX ADMIN — FUROSEMIDE 40 MG: 40 TABLET ORAL at 08:58

## 2017-07-06 RX ADMIN — ATORVASTATIN CALCIUM 10 MG: 10 TABLET, FILM COATED ORAL at 08:58

## 2017-07-06 RX ADMIN — OXYCODONE AND ACETAMINOPHEN 1 TABLET: 5; 325 TABLET ORAL at 06:58

## 2017-07-06 RX ADMIN — INSULIN DETEMIR 16 UNITS: 100 INJECTION, SOLUTION SUBCUTANEOUS at 08:59

## 2017-07-06 RX ADMIN — LEVOTHYROXINE SODIUM 50 MCG: 50 TABLET ORAL at 08:58

## 2017-07-06 RX ADMIN — ASPIRIN 81 MG: 81 TABLET, COATED ORAL at 08:58
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
## 2017-07-06 NOTE — PROGRESS NOTES
"NEUROSURGERY PROGRESS NOTE     LOS: 1 day   Patient Care Team:  Hernan Thompson MD as PCP - General (Internal Medicine)  Hernan Thompson MD as Referring Physician (Internal Medicine)    Chief Complaint: Back and right leg pain    POD#: 1 Day Post-Op  Procedures:  L4-5 PLIF.    Interval History:   The patient apparently became confused with her OxyContin was trying to climb out of bed last evening.  Patient Complaints: Low back pain.  Patient Denies: Headache or leg pain.    Vital Signs: Blood pressure 119/62, pulse 96, temperature 98.8 °F (37.1 °C), temperature source Oral, resp. rate 16, height 62\" (157.5 cm), weight 136 lb (61.7 kg), SpO2 99 %.  Intake/Output:   Intake/Output Summary (Last 24 hours) at 07/06/17 0650  Last data filed at 07/06/17 0310   Gross per 24 hour   Intake             1100 ml   Output              730 ml   Net              370 ml     Drain output: 80/100 mL    Physical Exam:  Patient is somewhat confused.  She knows that the month is July that she is uncertain of her location.  She follows simple commands.  Dressing is in place on her incision.  She moves all extremities equally.     Data Review:      Results from last 7 days  Lab Units 07/06/17  0415 06/30/17  0924   WBC 10*3/mm3  --  10.06   HEMOGLOBIN g/dL 8.1* 9.7*   HEMATOCRIT % 26.4* 31.4*   PLATELETS 10*3/mm3  --  533*         Assessment/Plan:  1.  Recurrent L4-5 disc herniation with segmental instability status post additional decompression, fusion, stabilization.  2.  Intraoperative dural rent with CSF leak: Mobilize later today.  3.  Mild encephalopathy: Probably related to medication and anesthesia.  4.  Mild anemia: Repeat H&H tomorrow.  5.  Disposition: I anticipate that the patient will be discharged home within the next several days.      Vick Dhaliwal MD  07/06/17  6:50 AM    "

## 2017-07-06 NOTE — PROGRESS NOTES
Discharge Planning Assessment  [facility]     Patient Name: Paulina Johnson  MRN: [id number]  Today's Date: 7/6/2017    Admit Date: 7/5/2017          Discharge Needs Assessment       07/06/17 1328    Living Environment    Lives With spouse    Living Arrangements house    Home Accessibility bed and bath on same level;stairs to enter home    Number of Stairs to Enter Home 1    Transportation Available car;family or friend will provide    Living Environment    Quality Of Family Relationships supportive    Able to Return to Prior Living Arrangements yes    Living Arrangement Comments Ms. Johnson lives with her  in a one story home in [facility].  She stated that she has other family and friends that can also assist her at home.    Discharge Needs Assessment    Concerns To Be Addressed no discharge needs identified    Readmission Within The Last 30 Days no previous admission in last 30 days    Equipment Currently Used at Home walker, rolling;glucometer    Equipment Needed After Discharge --   Ms. Johnson stated that she will order a shower chair when she returns home.    Discharge Disposition home or self-care    Discharge Contact Information if Applicable  stef Munson [phone]            Discharge Plan       07/06/17 1332    Case Management/Social Work Plan    Plan Home    Patient/Family In Agreement With Plan yes    Additional Comments Met with Ms. Johnson and her  at the bedside to discuss discharge planning.  Ms. Johnson has adequate assistance from her , family and friends at home when she is discharged.  Sufficient DME in the home.  Discharge plan is home with family assistance.  Mr. Johnson will be transporting patient home by car at discharge.  CM will continue to follow.        Discharge Placement     No information found                Demographic Summary       07/06/17 1326    Referral Information    Admission Type inpatient    Arrived From home or self-care    Reason For  Consult discharge planning    Record Reviewed clinical discipline documentation;history and physical;medical record    Contact Information    Permission Granted to Share Information With     Primary Care Physician Information    Name Hernan Thompson MD            Functional Status       07/06/17 1327    Functional Status Prior    Ambulation 1-->assistive equipment    Transferring 1-->assistive equipment    Toileting 0-->independent    Bathing 0-->independent    Dressing 0-->independent    Eating 0-->independent    Communication 0-->understands/communicates without difficulty    Swallowing 0-->swallows foods/liquids without difficulty    IADL    Medications independent    Meal Preparation assistive equipment    Housekeeping assistive equipment    Laundry assistive equipment    Shopping assistive equipment    Oral Care independent    Activity Tolerance    Usual Activity Tolerance moderate    Cognitive/Perceptual/Developmental    Current Mental Status/Cognitive Functioning no deficits noted    Employment/Financial    Employment/Finance Comments Ms. Johnson has prescription drug coverage with Medicare and[employer].  Verified insurance with patient.  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            Psychosocial     None            Abuse/Neglect     None            Legal       07/06/17 1328    Legal    Legal Comments No advance directives.            Substance Abuse     None            Patient Forms     None          Lillie Meza

## 2017-07-06 NOTE — PLAN OF CARE
Problem: Patient Care Overview (Adult)  Goal: Plan of Care Review  Outcome: Ongoing (interventions implemented as appropriate)    07/06/17 1323   Coping/Psychosocial Response Interventions   Plan Of Care Reviewed With patient   Outcome Evaluation   Outcome Summary/Follow up Plan Pt's confusion greatly impacting progress toward education and independence with ADL tasks; able to tolerate mobilizing in hallway with CGA X2 however with mod/max cuing for adhering to spinal precautions.          Problem: Inpatient Occupational Therapy  Goal: Bed Mobility Goal LTG- OT  Outcome: Ongoing (interventions implemented as appropriate)    07/06/17 1323   Bed Mobility OT LTG   Bed Mobility OT LTG, Time to Achieve 5 - 7 days   Bed Mobility OT LTG, Activity Type supine to sit/sit to supine   Bed Mobility OT LTG, Assist Device bed rails   Bed Mobility OT LTG, Additional Goal log roll    Bed Mobility OT LTG, Outcome goal ongoing       Goal: Transfer Training Goal 1 LTG- OT  Outcome: Ongoing (interventions implemented as appropriate)    07/06/17 1323   Transfer Training OT LTG   Transfer Training OT LTG, Time to Achieve 5 - 7 days   Transfer Training OT LTG, Activity Type toilet;sit to stand/stand to sit   Transfer Training OT LTG, [place] Level supervision required   Transfer Training OT LTG, Assist Device walker, rolling   Transfer Training OT LTG, Outcome goal ongoing       Goal: Toileting Goal LTG- OT  Outcome: Ongoing (interventions implemented as appropriate)    07/06/17 1323   Toileting OT LTG   Toileting Goal OT LTG, Time to Achieve 5 - 7 days   Toileting Goal OT LTG, [place] Level supervision required   Toileting Goal OT LTG, Additional Goal hygiene and clothing management    Toileting Goal OT LTG, Outcome goal ongoing       Goal: LB Dressing Goal LTG- OT  Outcome: Ongoing (interventions implemented as appropriate)    07/06/17 1323   LB Dressing OT LTG   LB Dressing Goal OT LTG, Time to Achieve 5 - 7 days   LB  Dressing Goal OT LTG, [place] Level minimum assist (75% patient effort)   LB Dressing Goal OT LTG, Adaptive Equipment reacher;shoe horn, long handled;sock-aid;laces, elastic   LB Dressing Goal OT LTG, Outcome goal ongoing

## 2017-07-06 NOTE — NURSING NOTE
Discuss with Fadia OSUNA about patient's insulin orders put in by Dr. Dhaliwal. Beni advise to keep the order and continue to check blood sugar ACHS and Q6 hours but hold the 0000 and 0600 doses of Humilin R.

## 2017-07-06 NOTE — NURSING NOTE
Patient did not tolerate oxycontin because of acute confusion and nausea and vomiting. She was given the dose at 2348 and she became disoriented to place and situation at 0130 and was trying to get out of bed. After an hour and her talking to the [facility] and her  (on the phone), she became more oriented to her surroundings. She tolerated the percocet well.

## 2017-07-06 NOTE — PLAN OF CARE
Problem: Perioperative Period (Adult)  Goal: Signs and Symptoms of Listed Potential Problems Will be Absent or Manageable (Perioperative Period)  Outcome: Ongoing (interventions implemented as appropriate)    07/06/17 0426   Perioperative Period   Problems Assessed (Perioperative Period) all   Problems Present (Perioperative Period) pain         Problem: Fall Risk (Adult)  Goal: Identify Related Risk Factors and Signs and Symptoms  Outcome: Ongoing (interventions implemented as appropriate)    07/06/17 0426   Fall Risk   Fall Risk: Related Risk Factors gait/mobility problems       Goal: Absence of Falls  Outcome: Ongoing (interventions implemented as appropriate)    07/06/17 0426   Fall Risk (Adult)   Absence of Falls making progress toward outcome

## 2017-07-06 NOTE — PLAN OF CARE
Problem: Patient Care Overview (Adult)  Goal: Plan of Care Review  Outcome: Ongoing (interventions implemented as appropriate)    07/06/17 1322   Coping/Psychosocial Response Interventions   Plan Of Care Reviewed With patient;spouse   Outcome Evaluation   Outcome Summary/Follow up Plan Pt ambulated 420 feet with CGA x2 and RW. Pt's confusion/agitation impacted ability to receive education toward precautions and mobility training. Will progress with mobility as able.   Patient Care Overview   Progress progress toward functional goals as expected         Problem: Inpatient Physical Therapy  Goal: Bed Mobility Goal LTG- PT  Outcome: Ongoing (interventions implemented as appropriate)    07/06/17 1322   Bed Mobility PT LTG   Bed Mobility PT LTG, Date Established 07/06/17   Bed Mobility PT LTG, Time to Achieve 5 days   Bed Mobility PT LTG, Activity Type supine to sit/sit to supine   Bed Mobility PT LTG, [place] Level conditional independence   Bed Mobility PT Goal LTG, Assist Device (no AD)       Goal: Transfer Training Goal 1 LTG- PT  Outcome: Ongoing (interventions implemented as appropriate)    07/06/17 1322   Transfer Training PT LTG   Transfer Training PT LTG, Date Established 07/06/17   Transfer Training PT LTG, Time to Achieve 5 days   Transfer Training PT LTG, Activity Type sit to stand/stand to sit   Transfer Training PT LTG, [place] Level conditional independence   Transfer Training PT LTG, Assist Device walker, rolling       Goal: Gait Training Goal LTG- PT  Outcome: Ongoing (interventions implemented as appropriate)    07/06/17 1322   Gait Training PT LTG   Gait Training Goal PT LTG, Date Established 07/06/17   Gait Training Goal PT LTG, Time to Achieve 5 days   Gait Training Goal PT LTG, [place] Level conditional independence   Gait Training Goal PT LTG, Assist Device walker, rolling   Gait Training Goal PT LTG, Distance to Achieve 750 feet
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
## 2017-07-07 LAB
ANION GAP SERPL CALCULATED.3IONS-SCNC: 12 MMOL/L (ref 3–11)
BUN BLD-MCNC: 30 MG/DL (ref 9–23)
BUN/CREAT SERPL: 16.7 (ref 7–25)
CALCIUM SPEC-SCNC: 9 MG/DL (ref 8.7–10.4)
CHLORIDE SERPL-SCNC: 95 MMOL/L (ref 99–109)
CO2 SERPL-SCNC: 25 MMOL/L (ref 20–31)
CREAT BLD-MCNC: 1.8 MG/DL (ref 0.6–1.3)
GFR SERPL CREATININE-BSD FRML MDRD: 28 ML/MIN/1.73
GLUCOSE BLD-MCNC: 177 MG/DL (ref 70–100)
GLUCOSE BLDC GLUCOMTR-MCNC: 138 MG/DL (ref 70–130)
GLUCOSE BLDC GLUCOMTR-MCNC: 192 MG/DL (ref 70–130)
GLUCOSE BLDC GLUCOMTR-MCNC: 260 MG/DL (ref 70–130)
GLUCOSE BLDC GLUCOMTR-MCNC: 325 MG/DL (ref 70–130)
GLUCOSE BLDC GLUCOMTR-MCNC: 344 MG/DL (ref 70–130)
HCT VFR BLD AUTO: 25 % (ref 34.5–44)
HGB BLD-MCNC: 7.8 G/DL (ref 11.5–15.5)
POTASSIUM BLD-SCNC: 3.9 MMOL/L (ref 3.5–5.5)
SODIUM BLD-SCNC: 132 MMOL/L (ref 132–146)

## 2017-07-07 PROCEDURE — 85018 HEMOGLOBIN: CPT | Performed by: NEUROLOGICAL SURGERY

## 2017-07-07 PROCEDURE — 85014 HEMATOCRIT: CPT | Performed by: NEUROLOGICAL SURGERY

## 2017-07-07 PROCEDURE — 63710000001 INSULIN DETEMIR PER 5 UNITS: Performed by: NEUROLOGICAL SURGERY

## 2017-07-07 PROCEDURE — 97530 THERAPEUTIC ACTIVITIES: CPT

## 2017-07-07 PROCEDURE — 63710000001 INSULIN LISPRO (HUMAN) PER 5 UNITS: Performed by: NEUROLOGICAL SURGERY

## 2017-07-07 PROCEDURE — 82962 GLUCOSE BLOOD TEST: CPT

## 2017-07-07 PROCEDURE — 97116 GAIT TRAINING THERAPY: CPT

## 2017-07-07 PROCEDURE — 80048 BASIC METABOLIC PNL TOTAL CA: CPT | Performed by: NEUROLOGICAL SURGERY

## 2017-07-07 RX ORDER — TRAMADOL HYDROCHLORIDE 50 MG/1
75 TABLET ORAL EVERY 8 HOURS PRN
Status: DISCONTINUED | OUTPATIENT
Start: 2017-07-07 | End: 2017-07-08 | Stop reason: HOSPADM

## 2017-07-07 RX ORDER — TRAMADOL HYDROCHLORIDE 50 MG/1
50 TABLET ORAL ONCE
Status: COMPLETED | OUTPATIENT
Start: 2017-07-07 | End: 2017-07-07

## 2017-07-07 RX ORDER — TRAMADOL HYDROCHLORIDE 50 MG/1
50 TABLET ORAL 3 TIMES DAILY PRN
Status: DISCONTINUED | OUTPATIENT
Start: 2017-07-07 | End: 2017-07-07

## 2017-07-07 RX ADMIN — DULOXETINE HYDROCHLORIDE 30 MG: 30 CAPSULE, DELAYED RELEASE ORAL at 08:54

## 2017-07-07 RX ADMIN — INSULIN DETEMIR 16 UNITS: 100 INJECTION, SOLUTION SUBCUTANEOUS at 08:55

## 2017-07-07 RX ADMIN — BISACODYL 10 MG: 10 SUPPOSITORY RECTAL at 08:54

## 2017-07-07 RX ADMIN — LEVOTHYROXINE SODIUM 50 MCG: 50 TABLET ORAL at 08:54

## 2017-07-07 RX ADMIN — METFORMIN HYDROCHLORIDE 850 MG: 850 TABLET, FILM COATED ORAL at 21:50

## 2017-07-07 RX ADMIN — ATORVASTATIN CALCIUM 10 MG: 10 TABLET, FILM COATED ORAL at 08:54

## 2017-07-07 RX ADMIN — TRAMADOL HYDROCHLORIDE 50 MG: 50 TABLET, COATED ORAL at 14:39

## 2017-07-07 RX ADMIN — POLYETHYLENE GLYCOL 3350 17 G: 17 POWDER, FOR SOLUTION ORAL at 08:54

## 2017-07-07 RX ADMIN — INSULIN HUMAN 5 UNITS: 100 INJECTION, SOLUTION PARENTERAL at 13:01

## 2017-07-07 RX ADMIN — TRAMADOL HYDROCHLORIDE 75 MG: 50 TABLET, COATED ORAL at 18:35

## 2017-07-07 RX ADMIN — FAMOTIDINE 20 MG: 20 TABLET ORAL at 08:54

## 2017-07-07 RX ADMIN — METFORMIN HYDROCHLORIDE 850 MG: 850 TABLET, FILM COATED ORAL at 08:57

## 2017-07-07 RX ADMIN — VALSARTAN 320 MG: 160 TABLET, FILM COATED ORAL at 08:54

## 2017-07-07 RX ADMIN — ACETAMINOPHEN 650 MG: 325 TABLET, FILM COATED ORAL at 08:55

## 2017-07-07 RX ADMIN — FAMOTIDINE 20 MG: 20 TABLET ORAL at 18:36

## 2017-07-07 RX ADMIN — ACETAMINOPHEN 650 MG: 325 TABLET, FILM COATED ORAL at 18:35

## 2017-07-07 RX ADMIN — METFORMIN HYDROCHLORIDE 850 MG: 850 TABLET, FILM COATED ORAL at 18:36

## 2017-07-07 RX ADMIN — ASPIRIN 81 MG: 81 TABLET, COATED ORAL at 08:55

## 2017-07-07 RX ADMIN — INSULIN DETEMIR 16 UNITS: 100 INJECTION, SOLUTION SUBCUTANEOUS at 18:35

## 2017-07-07 RX ADMIN — FUROSEMIDE 40 MG: 40 TABLET ORAL at 08:55

## 2017-07-07 RX ADMIN — POLYETHYLENE GLYCOL 3350 17 G: 17 POWDER, FOR SOLUTION ORAL at 18:35

## 2017-07-07 RX ADMIN — ACETAMINOPHEN 650 MG: 325 TABLET, FILM COATED ORAL at 21:50

## 2017-07-07 RX ADMIN — INSULIN LISPRO 12 UNITS: 100 INJECTION, SOLUTION INTRAVENOUS; SUBCUTANEOUS at 09:15

## 2017-07-07 RX ADMIN — TRAMADOL HYDROCHLORIDE 50 MG: 50 TABLET, COATED ORAL at 08:55

## 2017-07-07 NOTE — THERAPY TREATMENT NOTE
Acute Care - Physical Therapy Treatment Note  [facility]     Patient Name: Paulina Johnson  : 1945  MRN: [id number]  Today's Date: 2017  Onset of Illness/Injury or Date of Surgery Date: 17  Date of Referral to PT: 17  Referring Physician: MD Isra    Admit Date: 2017    Visit Dx:    ICD-10-CM ICD-9-CM   1. Impaired functional mobility, balance, gait, and endurance Z74.09 V49.89   2. Lumbar spine instability M53.2X6 724.9   3. Impaired mobility and ADLs Z74.09 799.89     Patient Active Problem List   Diagnosis   • Lumbar disc herniation   • S/P right L4-5 laminotomy with medial facetectomy, foraminotomy and L4-5 diskectomy   • DM (diabetes mellitus)   • Hypothyroid   • HTN (hypertension)   • Hyperlipidemia   • H/O: stroke   • Right leg pain   • Lumbar spine instability               Adult Rehabilitation Note       17 1100          Rehab Assessment/Intervention    Discipline physical therapist  -LM      Document Type therapy note (daily note)  -LM      Subjective Information agree to therapy;complains of;pain  -LM      Patient Effort, Rehab Treatment good  -LM      Symptoms Noted During/After Treatment other (see comments)   confusion  -LM      Symptoms Noted Comment RN aware  -LM      Precautions/Limitations fall precautions;spinal precautions  -LM      Recorded by [LM] Ce Fraser, PT      Pain Assessment    Pain Assessment 0-10  -LM      Pain Score 3  -LM      Post Pain Score 3  -LM      Pain Type Acute pain  -LM      Pain Location Back  -LM      Pain Orientation Lower  -LM      Recorded by [LM] Ce Fraser PT      Cognitive Assessment/Intervention    Current Cognitive/Communication Assessment impaired  -LM      Orientation Status oriented to;person;place;situation;disoriented to;time;required verbal cueing (specifiy in comments)   place: city only  -LM      Follows Commands/Answers Questions 75% of the time;needs cueing;needs repetition  -LM      Personal Safety  moderate impairment;decreased awareness, need for assist;decreased awareness, need for safety;decreased insight to deficits;impulsive  -LM      Recorded by [LM] Ce Fraser, PT      Bed Mobility, Assessment/Treatment    Bed Mobility, Assistive Device bed rails  -LM      Bed Mobility, Scoot/Bridge, [place] maximum assist (25% patient effort);2 person assist required  -LM      Bed Mob, Supine to Sit, [place] not tested   Pt received standing in room with spouse  -LM      Bed Mob, Sit to Supine, [place] minimum assist (75% patient effort);2 person assist required  -LM      Bed Mobility, Comment Verbal cues for proper log roll technique; pt unable to follow commands.  -LM      Recorded by [LM] Ce Fraser, PT      Transfer Assessment/Treatment    Transfers, Sit-Stand [place] contact guard assist;verbal cues required  -LM      Transfers, Stand-Sit [place] contact guard assist;verbal cues required  -LM      Transfers, Sit-Stand-Sit, Assist Device rolling walker  -LM      Toilet Transfer, [place] minimum assist (75% patient effort);verbal cues required  -LM      Toilet Transfer, Assistive Device rolling walker  -LM      Transfer, Safety Issues sequencing ability decreased;step length decreased;impulsivity  -LM      Transfer, Impairments strength decreased;impaired balance;pain  -LM      Transfer, Comment Pt demo ability to push from bed to stand without verbal cues; verbal cues to reach for bed prior to sitting, reach for grab bars prior to standing at toilet. Pt demo poor safety awareness with toilet transfer; impulsive with sitting and standing from commode.  -LM      Recorded by [LM] Ce Fraser, PT      Gait Assessment/Treatment    Gait, [place] Level contact guard assist  -LM      Gait, Assistive Device rolling walker  -LM      Gait, Distance (Feet) 500  -LM      Gait, Gait Pattern Analysis swing-through gait  -LM      Gait, Gait Deviations bilateral:;[place]  decreased;decreased heel strike;step length decreased;toe-to-floor clearance decreased  -LM      Gait, Safety Issues sequencing ability decreased;step length decreased  -LM      Gait, Impairments strength decreased;pain;other (see comments)   cognition status affected ability to follow commands  -LM      Gait, Comment Verbal cues to remain within bounds of RW and keep assistive device close, hands on handgrips. Pt exhibited unawareness due to RW running into walls/corners when turning. Educated on spinal precautions during ambulation.  -LM      Recorded by [LM] Ce Fraser PT      Therapy Exercises    Bilateral Lower Extremities AROM:;15 reps;supine;ankle pumps/circles;glut sets;quad sets;heel slides;hip IR;hip ER;other reps   abd set, bent knee fallout, arms overhead  -LM      Recorded by [LM] Ce Fraser PT      Positioning and Restraints    Pre-Treatment Position standing in room   Pt on way to bathroom  -LM      Post Treatment Position bed   HOB 15  -LM      In Bed notified nsg;supine;call light within reach;encouraged to call for assist;with family/caregiver  -LM      Recorded by [LM] Ce Fraser PT        User Key  (r) = Recorded By, (t) = Taken By, (c) = Cosigned By    Initials Name Effective Dates    LM Ce Fraser, PT 06/09/17 -                 IP PT Goals       07/07/17 1145 07/06/17 1322       Bed Mobility PT LTG    Bed Mobility PT LTG, Date Established  07/06/17  -LM     Bed Mobility PT LTG, Time to Achieve  5 days  -LM     Bed Mobility PT LTG, Activity Type  supine to sit/sit to supine  -LM     Bed Mobility PT LTG, [place] Level  conditional independence  -LM     Bed Mobility PT Goal  LTG, Assist Device  --   no AD  -LM     Bed Mobility PT LTG, Date Goal Reviewed 07/07/17  -LM      Bed Mobility PT LTG, Outcome goal ongoing  -LM      Transfer Training PT LTG    Transfer Training PT LTG, Date Established  07/06/17  -LM     Transfer Training PT LTG, Time to Achieve  5 days  -LM      Transfer Training PT LTG, Activity Type  sit to stand/stand to sit  -LM     Transfer Training PT LTG, [place] Level  conditional independence  -LM     Transfer Training PT LTG, Assist Device  walker, rolling  -LM     Transfer Training PT  LTG, Date Goal Reviewed 07/07/17  -LM      Transfer Training PT LTG, Outcome goal ongoing  -LM      Gait Training PT LTG    Gait Training Goal PT LTG, Date Established  07/06/17  -LM     Gait Training Goal PT LTG, Time to Achieve  5 days  -LM     Gait Training Goal PT LTG, [place] Level  conditional independence  -LM     Gait Training Goal PT LTG, Assist Device  walker, rolling  -LM     Gait Training Goal PT LTG, Distance to Achieve  750 feet  -LM     Gait Training Goal PT LTG, Date Goal Reviewed 07/07/17  -LM      Gait Training Goal PT LTG, Outcome goal ongoing  -LM        User Key  (r) = Recorded By, (t) = Taken By, (c) = Cosigned By    Initials Name Provider Type    KANWAL Fraser, PT Physical Therapist          Physical Therapy Education     Title: PT OT SLP Therapies (Active)     Topic: Physical Therapy (Active)     Point: Mobility training (Active)    Learning Progress Summary    Learner Readiness Method Response Comment Documented by Status   Patient Acceptance E,D NR Reviewed HEP, back/spinal precautions, gait mechanics, benefits of activity, ambulation with assistance from nursing. Due to cognitive status, unable to verbalize or demonstrate understanding.  07/07/17 1144 Active    Nonacceptance E,D NR,NL Attempted to review back/spinal precautions, gait mechanics, benefit of activity, ambulation status. Due to cognitive status, unable to verbalize understanding.  07/06/17 1601 Active   Significant Other Acceptance E,D NR Reviewed HEP, back/spinal precautions, gait mechanics, benefits of activity, ambulation with assistance from nursing. Due to cognitive status, unable to verbalize or demonstrate understanding.  07/07/17 1144 Active               Point:  Home exercise program (Active)    Learning Progress Summary    Learner Readiness Method Response Comment Documented by Status   Patient Acceptance E,D NR Reviewed HEP, back/spinal precautions, gait mechanics, benefits of activity, ambulation with assistance from nursing. Due to cognitive status, unable to verbalize or demonstrate understanding.  07/07/17 1144 Active   Significant Other Acceptance E,D NR Reviewed HEP, back/spinal precautions, gait mechanics, benefits of activity, ambulation with assistance from nursing. Due to cognitive status, unable to verbalize or demonstrate understanding.  07/07/17 1144 Active               Point: Body mechanics (Active)    Learning Progress Summary    Learner Readiness Method Response Comment Documented by Status   Patient Acceptance E,D NR Reviewed HEP, back/spinal precautions, gait mechanics, benefits of activity, ambulation with assistance from nursing. Due to cognitive status, unable to verbalize or demonstrate understanding.  07/07/17 1144 Active    Nonacceptance E,D NR,NL Attempted to review back/spinal precautions, gait mechanics, benefit of activity, ambulation status. Due to cognitive status, unable to verbalize understanding.  07/06/17 1601 Active   Significant Other Acceptance E,D NR Reviewed HEP, back/spinal precautions, gait mechanics, benefits of activity, ambulation with assistance from nursing. Due to cognitive status, unable to verbalize or demonstrate understanding.  07/07/17 1144 Active               Point: Precautions (Active)    Learning Progress Summary    Learner Readiness Method Response Comment Documented by Status   Patient Acceptance E,D NR Reviewed HEP, back/spinal precautions, gait mechanics, benefits of activity, ambulation with assistance from nursing. Due to cognitive status, unable to verbalize or demonstrate understanding.  07/07/17 1144 Active    Nonacceptance E,D NR,NL Attempted to review back/spinal precautions, gait mechanics,  benefit of activity, ambulation status. Due to cognitive status, unable to verbalize understanding.  07/06/17 1601 Active   Significant Other Acceptance E,D NR Reviewed HEP, back/spinal precautions, gait mechanics, benefits of activity, ambulation with assistance from nursing. Due to cognitive status, unable to verbalize or demonstrate understanding.  07/07/17 1144 Active                      User Key     Initials Effective Dates Name Provider Type Discipline     06/09/17 -  Ce Fraser, PT Physical Therapist PT                    PT Recommendation and Plan  Anticipated Equipment Needs At Discharge: front wheeled walker  Anticipated Discharge Disposition: home with assist (pending cognitive status)  Demonstrates Need for Referral to Another Service: home health care  Planned Therapy Interventions: balance training, bed mobility training, gait training, home exercise program, patient/family education, strengthening, transfer training  PT Frequency: daily  Plan of Care Review  Plan Of Care Reviewed With: patient  Progress: progress toward functional goals as expected  Outcome Summary/Follow up Plan: Pt ambulated 500 feet with CGA and RW, limited by fatigue. Pt continues to present with confusion, affecting progress with education and safety. Will  progress with mobility as able.           Outcome Measures       07/07/17 1100 07/06/17 1323 07/06/17 1322    How much help from another person do you currently need...    Turning from your back to your side while in flat bed without using bedrails? 3  -LM  3  -LM    Moving from lying on back to sitting on the side of a flat bed without bedrails? 3  -LM  3  -LM    Moving to and from a bed to a chair (including a wheelchair)? 3  -LM  3  -LM    Standing up from a chair using your arms (e.g., wheelchair, bedside chair)? 3  -LM  3  -LM    Climbing 3-5 steps with a railing? 2  -LM  2  -LM    To walk in hospital room? 3  -LM  3  -LM    AM-PAC 6 Clicks Score 17  -LM  17   -LM    How much help from another is currently needed...    Putting on and taking off regular lower body clothing?  1  -ST     Bathing (including washing, rinsing, and drying)  1  -ST     Toileting (which includes using toilet bed pan or urinal)  1  -ST     Putting on and taking off regular upper body clothing  3  -ST     Taking care of personal grooming (such as brushing teeth)  3  -ST     Eating meals  4  -ST     Score  13  -ST     Functional Assessment    Outcome Measure Options AM-PAC 6 Clicks Basic Mobility (PT)  -LM AM-PAC 6 Clicks Daily Activity (OT)  -ST AM-PAC 6 Clicks Basic Mobility (PT)  -LM      User Key  (r) = Recorded By, (t) = Taken By, (c) = Cosigned By    Initials Name Provider Type     Medina Taylor OTR Occupational Therapist    KANWAL Fraser, PT Physical Therapist           Time Calculation:         PT Charges       07/07/17 1147          Time Calculation    Start Time 1100  -LM      PT Received On 07/07/17  -LM      PT Goal Re-Cert Due Date 07/16/17  -LM      Time Calculation- PT    Total Timed Code Minutes- PT 15 minute(s)  -LM        User Key  (r) = Recorded By, (t) = Taken By, (c) = Cosigned By    Initials Name Provider Type    KANWAL Fraser PT Physical Therapist          Therapy Charges for Today     Code Description Service Date Service Provider Modifiers Qty    16196806291 HC PT EVAL MOD COMPLEXITY 4 7/6/2017 Ce Fraser, PT GP 1    [id number] HC GAIT TRAINING EA 15 MIN 7/7/2017 Ce Fraser, PT GP 1          PT G-Codes  Outcome Measure Options: AM-PAC 6 Clicks Basic Mobility (PT)    Ce Fraser PT  7/7/2017

## 2017-07-07 NOTE — PLAN OF CARE
Problem: Patient Care Overview (Adult)  Goal: Plan of Care Review  Outcome: Ongoing (interventions implemented as appropriate)    07/07/17 1243   Coping/Psychosocial Response Interventions   Plan Of Care Reviewed With patient;spouse   Outcome Evaluation   Outcome Summary/Follow up Plan Pt needs assist with txrs at this time. Pt requires mod to max cues for back precautions secondary to confusion. Continue OT and pt currently needs 24 hr care at home.   Patient Care Overview   Progress improving         Problem: Inpatient Occupational Therapy  Goal: Bed Mobility Goal LTG- OT  Outcome: Ongoing (interventions implemented as appropriate)    07/06/17 1323 07/07/17 1243   Bed Mobility OT LTG   Bed Mobility OT LTG, Time to Achieve 5 - 7 days --    Bed Mobility OT LTG, Activity Type supine to sit/sit to supine --    Bed Mobility OT LTG, Assist Device bed rails --    Bed Mobility OT LTG, Additional Goal log roll  --    Bed Mobility OT LTG, Outcome --  goal ongoing       Goal: Transfer Training Goal 1 LTG- OT  Outcome: Ongoing (interventions implemented as appropriate)    07/06/17 1323 07/07/17 1243   Transfer Training OT LTG   Transfer Training OT LTG, Time to Achieve 5 - 7 days --    Transfer Training OT LTG, Activity Type toilet;sit to stand/stand to sit --    Transfer Training OT LTG, [place] Level supervision required --    Transfer Training OT LTG, Assist Device walker, rolling --    Transfer Training OT LTG, Outcome --  goal ongoing       Goal: Toileting Goal LTG- OT  Outcome: Ongoing (interventions implemented as appropriate)    07/06/17 1323 07/07/17 1243   Toileting OT LTG   Toileting Goal OT LTG, Time to Achieve 5 - 7 days --    Toileting Goal OT LTG, [place] Level supervision required --    Toileting Goal OT LTG, Additional Goal hygiene and clothing management  --    Toileting Goal OT LTG, Outcome --  goal ongoing       Goal: LB Dressing Goal LTG- OT  Outcome: Ongoing (interventions implemented as  appropriate)    07/06/17 1323 07/07/17 1243   LB Dressing OT LTG   LB Dressing Goal OT LTG, Time to Achieve 5 - 7 days --    LB Dressing Goal OT LTG, [place] Level minimum assist (75% patient effort) --    LB Dressing Goal OT LTG, Adaptive Equipment reacher;shoe horn, long handled;sock-aid;laces, elastic --    LB Dressing Goal OT LTG, Outcome --  goal ongoing

## 2017-07-07 NOTE — PLAN OF CARE
Problem: Patient Care Overview (Adult)  Goal: Plan of Care Review  Outcome: Ongoing (interventions implemented as appropriate)    07/07/17 8177   Coping/Psychosocial Response Interventions   Plan Of Care Reviewed With patient   Patient Care Overview   Progress no change

## 2017-07-07 NOTE — PLAN OF CARE
Problem: Patient Care Overview (Adult)  Goal: Plan of Care Review  Outcome: Ongoing (interventions implemented as appropriate)    07/07/17 1145   Coping/Psychosocial Response Interventions   Plan Of Care Reviewed With patient   Outcome Evaluation   Outcome Summary/Follow up Plan Pt ambulated 500 feet with CGA and RW, limited by fatigue. Pt continues to present with confusion, affecting progress with education and safety. Will progress with mobility as able.    Patient Care Overview   Progress progress toward functional goals as expected         Problem: Inpatient Physical Therapy  Goal: Bed Mobility Goal LTG- PT  Outcome: Ongoing (interventions implemented as appropriate)    07/06/17 1322 07/07/17 1145   Bed Mobility PT LTG   Bed Mobility PT LTG, Date Established 07/06/17 --    Bed Mobility PT LTG, Time to Achieve 5 days --    Bed Mobility PT LTG, Activity Type supine to sit/sit to supine --    Bed Mobility PT LTG, [place] Level conditional independence --    Bed Mobility PT Goal LTG, Assist Device (no AD) --    Bed Mobility PT LTG, Date Goal Reviewed --  07/07/17   Bed Mobility PT LTG, Outcome --  goal ongoing       Goal: Transfer Training Goal 1 LTG- PT  Outcome: Ongoing (interventions implemented as appropriate)    07/06/17 1322 07/07/17 1145   Transfer Training PT LTG   Transfer Training PT LTG, Date Established 07/06/17 --    Transfer Training PT LTG, Time to Achieve 5 days --    Transfer Training PT LTG, Activity Type sit to stand/stand to sit --    Transfer Training PT LTG, [place] Level conditional independence --    Transfer Training PT LTG, Assist Device walker, rolling --    Transfer Training PT LTG, Date Goal Reviewed --  07/07/17   Transfer Training PT LTG, Outcome --  goal ongoing       Goal: Gait Training Goal LTG- PT  Outcome: Ongoing (interventions implemented as appropriate)    07/06/17 1322 07/07/17 1145   Gait Training PT LTG   Gait Training Goal PT LTG, Date Established 07/06/17 --     Gait Training Goal PT LTG, Time to Achieve 5 days --    Gait Training Goal PT LTG, [place] Level conditional independence --    Gait Training Goal PT LTG, Assist Device walker, rolling --    Gait Training Goal PT LTG, Distance to Achieve 750 feet --    Gait Training Goal PT LTG, Date Goal Reviewed --  07/07/17   Gait Training Goal PT LTG, Outcome --  goal ongoing

## 2017-07-07 NOTE — THERAPY TREATMENT NOTE
Acute Care - Occupational Therapy Treatment Note  [facility]     Patient Name: Paulina Johnson  : 1945  MRN: [id number]  Today's Date: 2017  Onset of Illness/Injury or Date of Surgery Date: 17  Date of Referral to OT: 17  Referring Physician: MD Isra      Admit Date: 2017    Visit Dx:     ICD-10-CM ICD-9-CM   1. Impaired functional mobility, balance, gait, and endurance Z74.09 V49.89   2. Lumbar spine instability M53.2X6 724.9   3. Impaired mobility and ADLs Z74.09 799.89     Patient Active Problem List   Diagnosis   • Lumbar disc herniation   • S/P right L4-5 laminotomy with medial facetectomy, foraminotomy and L4-5 diskectomy   • DM (diabetes mellitus)   • Hypothyroid   • HTN (hypertension)   • Hyperlipidemia   • H/O: stroke   • Right leg pain   • Lumbar spine instability             Adult Rehabilitation Note       17 1104 17 1100       Rehab Assessment/Intervention    Discipline occupational therapist  -AN physical therapist  -LM     Document Type therapy note (daily note)  -AN therapy note (daily note)  -LM     Subjective Information no complaints;agree to therapy  -AN agree to therapy;complains of;pain  -LM     Patient Effort, Rehab Treatment good  -AN good  -LM     Symptoms Noted During/After Treatment  other (see comments)   confusion  -LM     Symptoms Noted Comment  RN aware  -LM     Precautions/Limitations fall precautions;spinal precautions  -AN fall precautions;spinal precautions  -LM     Specific Treatment Considerations pt is confused  -AN      Recorded by [ALIS] Santa Fox OT [LM] Ce Fraser PT     Pain Assessment    Pain Assessment 0-10  -AN 0-10  -LM     Pain Score 3  -AN 3  -LM     Post Pain Score 3  -AN 3  -LM     Pain Type Acute pain  -AN Acute pain  -LM     Pain Location Back  -AN Back  -LM     Pain Orientation  Lower  -LM     Recorded by [ALIS] Santa Fox OT [LM] Ce Fraser PT     Cognitive Assessment/Intervention    Current  Cognitive/Communication Assessment impaired  -AN impaired  -LM     Orientation Status oriented to;person;situation  -AN oriented to;person;place;situation;disoriented to;time;required verbal cueing (specifiy in comments)   place: city only  -LM     Follows Commands/Answers Questions 75% of the time;needs cueing;needs repetition  -AN 75% of the time;needs cueing;needs repetition  -LM     Personal Safety moderate impairment;decreased awareness, need for assist;decreased awareness, need for safety;decreased insight to deficits  -AN moderate impairment;decreased awareness, need for assist;decreased awareness, need for safety;decreased insight to deficits;impulsive  -LM     Personal Safety Interventions fall prevention program maintained  -AN      Recorded by [AN] Santa Fox OT [LM] Ce Fraser, PT     Bed Mobility, Assessment/Treatment    Bed Mobility, Assistive Device  bed rails  -LM     Bed Mobility, Scoot/Bridge, Kerr  maximum assist (25% patient effort);2 person assist required  -LM     Bed Mob, Supine to Sit, Kerr  not tested   Pt received standing in room with spouse  -LM     Bed Mob, Sit to Supine, [place]  minimum assist (75% patient effort);2 person assist required  -LM     Bed Mobility, Comment  Verbal cues for proper log roll technique; pt unable to follow commands.  -LM     Recorded by  [LM] Ce Fraser, PT     Transfer Assessment/Treatment    Transfers, Sit-Stand Kerr contact guard assist;verbal cues required  -AN contact guard assist;verbal cues required  -LM     Transfers, Stand-Sit [place] verbal cues required;contact guard assist  -AN contact guard assist;verbal cues required  -LM     Transfers, Sit-Stand-Sit, Assist Device rolling walker  -AN rolling walker  -LM     Toilet Transfer, [place] minimum assist (75% patient effort)  -AN minimum assist (75% patient effort);verbal cues required  -LM     Toilet Transfer, Assistive Device rolling walker  -AN  rolling walker  -LM     Transfer, Safety Issues sequencing ability decreased;step length decreased;impulsivity  -AN sequencing ability decreased;step length decreased;impulsivity  -LM     Transfer, Impairments impaired balance;strength decreased  -AN strength decreased;impaired balance;pain  -LM     Transfer, Comment impulsive with standing and needs cues to comply with spinal precautions  -AN Pt demo ability to push from bed to stand without verbal cues; verbal cues to reach for bed prior to sitting, reach for grab bars prior to standing at toilet. Pt demo poor safety awareness with toilet transfer; impulsive with sitting and standing from commode.  -LM     Recorded by [AN] Santa Fox OT [LM] Ce Fraser, PT     Gait Assessment/Treatment    Gait, [place] Level  contact guard assist  -LM     Gait, Assistive Device  rolling walker  -LM     Gait, Distance (Feet)  500  -LM     Gait, Gait Pattern Analysis  swing-through gait  -LM     Gait, Gait Deviations  bilateral:;lin decreased;decreased heel strike;step length decreased;toe-to-floor clearance decreased  -LM     Gait, Safety Issues  sequencing ability decreased;step length decreased  -LM     Gait, Impairments  strength decreased;pain;other (see comments)   cognition status affected ability to follow commands  -LM     Gait, Comment  Verbal cues to remain within bounds of RW and keep assistive device close, hands on handgrips. Pt exhibited unawareness due to RW running into walls/corners when turning. Educated on spinal precautions during ambulation.  -LM     Recorded by  [LM] Ce Fraser, PT     Functional Mobility    Functional Mobility- Ind. Level contact guard assist;2 person assist required  -AN      Functional Mobility- Device rolling walker  -AN      Functional Mobility-Distance (Feet) 250  -AN      Recorded by [AN] Santa Fox OT      Toileting Assessment/Training    Toileting Assess/Train, Position sitting  -AN      Toileting  Assess/Train, Indepen Level minimum assist (75% patient effort)  -AN      Toileting Assess/Train, Impairments other (see comments)  -AN      Recorded by [AN] Santa Fox OT      Therapy Exercises    Bilateral Lower Extremities  AROM:;15 reps;supine;ankle pumps/circles;glut sets;quad sets;heel slides;hip IR;hip ER;other reps   abd set, bent knee fallout, arms overhead  -LM     Recorded by  [LM] Ce Fraser, PT     Positioning and Restraints    Pre-Treatment Position bathroom  -AN standing in room   Pt on way to bathroom  -LM     Post Treatment Position bed  -AN bed   HOB 15  -LM     In Bed supine;call light within reach;encouraged to call for assist;with PT;with family/caregiver  -AN notified nsg;supine;call light within reach;encouraged to call for assist;with family/caregiver  -LM     Recorded by [AN] Santa Fox, OT [LM] Ce Fraser, PT       User Key  (r) = Recorded By, (t) = Taken By, (c) = Cosigned By    Initials Name Effective Dates    AN Santa Fox OT 06/22/15 -     LM Ce Fraser PT 06/09/17 -                 OT Goals       07/07/17 1243 07/06/17 1323       Bed Mobility OT LTG    Bed Mobility OT LTG, Time to Achieve  5 - 7 days  -ST     Bed Mobility OT LTG, Activity Type  supine to sit/sit to supine  -ST     Bed Mobility OT LTG, Assist Device  bed rails  -ST     Bed Mobility OT LTG, Additional Goal  log roll   -ST     Bed Mobility OT LTG, Outcome goal ongoing  -AN goal ongoing  -ST     Transfer Training OT LTG    Transfer Training OT LTG, Time to Achieve  5 - 7 days  -ST     Transfer Training OT LTG, Activity Type  toilet;sit to stand/stand to sit  -ST     Transfer Training OT LTG, [place] Level  supervision required  -ST     Transfer Training OT LTG, Assist Device  walker, rolling  -ST     Transfer Training OT LTG, Outcome goal ongoing  -AN goal ongoing  -ST     Toileting OT LTG    Toileting Goal OT LTG, Time to Achieve  5 - 7 days  -ST     Toileting Goal OT LTG, [place]  Level  supervision required  -ST     Toileting Goal OT LTG, Additional Goal  hygiene and clothing management   -ST     Toileting Goal OT LTG, Outcome goal ongoing  -AN goal ongoing  -ST     LB Dressing OT LTG    LB Dressing Goal OT LTG, Time to Achieve  5 - 7 days  -ST     LB Dressing Goal OT LTG, [place] Level  minimum assist (75% patient effort)  -ST     LB Dressing Goal OT LTG, Adaptive Equipment  reacher;shoe horn, long handled;sock-aid;laces, elastic  -ST     LB Dressing Goal OT LTG, Outcome goal ongoing  -AN goal ongoing  -ST       User Key  (r) = Recorded By, (t) = Taken By, (c) = Cosigned By    Initials Name Provider Type    ST Medina Taylor, OTR Occupational Therapist    ALIS Fox, OT Occupational Therapist          Occupational Therapy Education     Title: PT OT SLP Therapies (Active)     Topic: Occupational Therapy (Active)     Point: ADL training (Done)    Description: Instruct learner(s) on proper safety adaptation and remediation techniques during self care or transfers.   Instruct in proper use of assistive devices.    Learning Progress Summary    Learner Readiness Method Response Comment Documented by Status   Patient Acceptance E,D,H HERB DERAS,NR Continued education with pt and spouse regarding spinal precautions and cease of IADL's at home and adaptations of BADL's. AN 07/07/17 1242 Done    Acceptance E,D,H NR role of OT, benefits of activity, spinal precautions, transfers, log roll ST 07/06/17 1522 Active   Family Acceptance E,D,H HERB DERAS,NR Continued education with pt and spouse regarding spinal precautions and cease of IADL's at home and adaptations of BADL's. AN 07/07/17 1242 Done    Acceptance E,D,H NR role of OT, benefits of activity, spinal precautions, transfers, log roll ST 07/06/17 1522 Active               Point: Home exercise program (Active)    Description: Instruct learner(s) on appropriate technique for monitoring, assisting and/or progressing therapeutic  exercises/activities.    Learning Progress Summary    Learner Readiness Method Response Comment Documented by Status   Patient Acceptance E,D,H NR role of OT, benefits of activity, spinal precautions, transfers, log roll ST 07/06/17 1522 Active   Family Acceptance E,D,H NR role of OT, benefits of activity, spinal precautions, transfers, log roll ST 07/06/17 1522 Active               Point: Precautions (Done)    Description: Instruct learner(s) on prescribed precautions during self-care and functional transfers.    Learning Progress Summary    Learner Readiness Method Response Comment Documented by Status   Patient Acceptance E,D,H BRAEDEN,DU,NR Continued education with pt and spouse regarding spinal precautions and cease of IADL's at home and adaptations of BADL's. AN 07/07/17 1242 Done    Acceptance E,D,H NR role of OT, benefits of activity, spinal precautions, transfers, log roll ST 07/06/17 1522 Active   Family Acceptance E,D,H HERB DERAS,NR Continued education with pt and spouse regarding spinal precautions and cease of IADL's at home and adaptations of BADL's. AN 07/07/17 1242 Done    Acceptance E,D,H NR role of OT, benefits of activity, spinal precautions, transfers, log roll ST 07/06/17 1522 Active               Point: Body mechanics (Done)    Description: Instruct learner(s) on proper positioning and spine alignment during self-care, functional mobility activities and/or exercises.    Learning Progress Summary    Learner Readiness Method Response Comment Documented by Status   Patient Acceptance E,D,H HERB DERAS,NR Continued education with pt and spouse regarding spinal precautions and cease of IADL's at home and adaptations of BADL's. AN 07/07/17 1242 Done    Acceptance E,D,H NR role of OT, benefits of activity, spinal precautions, transfers, log roll ST 07/06/17 1522 Active   Family Acceptance E,D,H BRAEDEN,DU,NR Continued education with pt and spouse regarding spinal precautions and cease of IADL's at home and adaptations of  BADL's.  07/07/17 1242 Done    Acceptance E,D,H NR role of OT, benefits of activity, spinal precautions, transfers, log roll  07/06/17 1522 Active                      User Key     Initials Effective Dates Name Provider Type Discipline     02/20/17 -  Medina Taylor, OTR Occupational Therapist OT     06/22/15 -  Santa Fox, OT Occupational Therapist OT                  OT Recommendation and Plan  Anticipated Equipment Needs At Discharge:  (TBD)  Anticipated Discharge Disposition:  (HH PT pending cog status)  Planned Therapy Interventions: ADL retraining, IADL retraining, balance training, bed mobility training, transfer training  Therapy Frequency: daily  Plan of Care Review  Plan Of Care Reviewed With: patient, spouse  Progress: improving  Outcome Summary/Follow up Plan: Pt needs assist with txrs at this time. Pt requires mod to max cues for back precautions secondary to confusion.  Continue OT and pt currently needs 24 hr care at home.        Outcome Measures       07/07/17 1104 07/07/17 1100 07/06/17 1323    How much help from another person do you currently need...    Turning from your back to your side while in flat bed without using bedrails?  3  -LM     Moving from lying on back to sitting on the side of a flat bed without bedrails?  3  -LM     Moving to and from a bed to a chair (including a wheelchair)?  3  -LM     Standing up from a chair using your arms (e.g., wheelchair, bedside chair)?  3  -LM     Climbing 3-5 steps with a railing?  2  -LM     To walk in hospital room?  3  -LM     AM-PAC 6 Clicks Score  17  -LM     How much help from another is currently needed...    Putting on and taking off regular lower body clothing? 1  -AN  1  -ST    Bathing (including washing, rinsing, and drying) 2  -AN  1  -ST    Toileting (which includes using toilet bed pan or urinal) 2  -AN  1  -ST    Putting on and taking off regular upper body clothing 3  -AN  3  -ST    Taking care of personal grooming (such as  brushing teeth) 3  -AN  3  -ST    Eating meals 4  -AN  4  -ST    Score 15  -AN  13  -ST    Functional Assessment    Outcome Measure Options  AM-PAC 6 Clicks Basic Mobility (PT)  -LM AM-[facility] 6 Clicks Daily Activity (OT)  -ST      07/06/17 1322          How much help from another person do you currently need...    Turning from your back to your side while in flat bed without using bedrails? 3  -LM      Moving from lying on back to sitting on the side of a flat bed without bedrails? 3  -LM      Moving to and from a bed to a chair (including a wheelchair)? 3  -LM      Standing up from a chair using your arms (e.g., wheelchair, bedside chair)? 3  -LM      Climbing 3-5 steps with a railing? 2  -LM      To walk in hospital room? 3  -LM      AM-PAC 6 Clicks Score 17  -LM      Functional Assessment    Outcome Measure Options AM-PAC 6 Clicks Basic Mobility (PT)  -LM        User Key  (r) = Recorded By, (t) = Taken By, (c) = Cosigned By    Initials Name Provider Type     Medina Taylor OTR Occupational Therapist    ALIS Fox OT Occupational Therapist    KANWAL Fraser, PT Physical Therapist           Time Calculation:         Time Calculation- OT       07/07/17 1246          Time Calculation- OT    OT Start Time 1104  -AN      Total Timed Code Minutes- OT 10 minute(s)  -AN      OT Received On 07/07/17  -AN      OT Goal Re-Cert Due Date 07/16/17  -AN        User Key  (r) = Recorded By, (t) = Taken By, (c) = Cosigned By    Initials Name Provider Type    ALIS Fox OT Occupational Therapist           Therapy Charges for Today     Code Description Service Date Service Provider Modifiers Qty    [id number]  OT THERAPEUTIC ACT EA 15 MIN 7/7/2017 Santa Fox OT GO 1               Santa Fox OT  7/7/2017

## 2017-07-07 NOTE — PROGRESS NOTES
"NEUROSURGERY PROGRESS NOTE     LOS: 2 days   Patient Care Team:  Hernan Thompson MD as PCP - General (Internal Medicine)  Hernan Thompson MD as Referring Physician (Internal Medicine)    Chief Complaint: Back and right leg pain.    POD#: 2 Days Post-Op  Procedures:  L4-5 PLIF.    Interval History:   Patient has refused lab draws and IVs.  She remains intermittently confused and paranoid.  Patient Complaints: Some back pain.  Patient Denies: Headache or leg pain.    Vital Signs: Blood pressure 145/64, pulse 91, temperature 98.6 °F (37 °C), temperature source Oral, resp. rate 16, height 62\" (157.5 cm), weight 136 lb (61.7 kg), SpO2 96 %.  Intake/Output:   Intake/Output Summary (Last 24 hours) at 07/07/17 0711  Last data filed at 07/07/17 0601   Gross per 24 hour   Intake                0 ml   Output             1925 ml   Net            -1925 ml     Drain output: 75/50 mL    Physical Exam:  The patient is awake, alert, and cantankerous.  She is oriented to the month and circumstances but not the place.  She follow simple commands.  Dry dressing is in place on her incision  Patient moves all extremities equally.     Data Review:    H&H not drawn due to patient refusal.    Assessment/Plan:  1. Recurrent L4-5 disc herniation with segmental instability status post additional decompression, fusion, stabilization.  2. Intraoperative dural rent with CSF leak: Mobilize.  3. Mild encephalopathy: Probably related to medication and anesthesia.  Discontinue pain medications and observe.  4. Mild anemia: Observe clinically.  5. Disposition: I anticipate that the patient will be discharged home within the next several days.      Vick Dhaliwal MD  07/07/17  7:11 AM    "

## 2017-07-08 VITALS
SYSTOLIC BLOOD PRESSURE: 113 MMHG | TEMPERATURE: 97 F | BODY MASS INDEX: 25.03 KG/M2 | RESPIRATION RATE: 16 BRPM | WEIGHT: 136 LBS | DIASTOLIC BLOOD PRESSURE: 58 MMHG | OXYGEN SATURATION: 96 % | HEART RATE: 86 BPM | HEIGHT: 62 IN

## 2017-07-08 LAB
GLUCOSE BLDC GLUCOMTR-MCNC: 237 MG/DL (ref 70–130)
GLUCOSE BLDC GLUCOMTR-MCNC: 269 MG/DL (ref 70–130)
GLUCOSE BLDC GLUCOMTR-MCNC: 56 MG/DL (ref 70–130)
GLUCOSE BLDC GLUCOMTR-MCNC: 58 MG/DL (ref 70–130)
HCT VFR BLD AUTO: 25.7 % (ref 34.5–44)
HGB BLD-MCNC: 8 G/DL (ref 11.5–15.5)

## 2017-07-08 PROCEDURE — 94799 UNLISTED PULMONARY SVC/PX: CPT

## 2017-07-08 PROCEDURE — 85014 HEMATOCRIT: CPT | Performed by: PHYSICIAN ASSISTANT

## 2017-07-08 PROCEDURE — 99024 POSTOP FOLLOW-UP VISIT: CPT | Performed by: PHYSICIAN ASSISTANT

## 2017-07-08 PROCEDURE — 85018 HEMOGLOBIN: CPT | Performed by: PHYSICIAN ASSISTANT

## 2017-07-08 PROCEDURE — 82962 GLUCOSE BLOOD TEST: CPT

## 2017-07-08 RX ORDER — CLOPIDOGREL BISULFATE 75 MG/1
75 TABLET ORAL DAILY
Qty: 30 TABLET
Start: 2017-07-08

## 2017-07-08 RX ORDER — OXYCODONE HYDROCHLORIDE AND ACETAMINOPHEN 5; 325 MG/1; MG/1
1 TABLET ORAL 2 TIMES DAILY PRN
Qty: 60 TABLET | Refills: 0 | Status: SHIPPED | OUTPATIENT
Start: 2017-07-08 | End: 2017-07-18

## 2017-07-08 RX ADMIN — ASPIRIN 81 MG: 81 TABLET, COATED ORAL at 08:24

## 2017-07-08 RX ADMIN — VALSARTAN 320 MG: 160 TABLET, FILM COATED ORAL at 08:24

## 2017-07-08 RX ADMIN — FUROSEMIDE 40 MG: 40 TABLET ORAL at 08:24

## 2017-07-08 RX ADMIN — TRAMADOL HYDROCHLORIDE 75 MG: 50 TABLET, COATED ORAL at 05:49

## 2017-07-08 RX ADMIN — FAMOTIDINE 20 MG: 20 TABLET ORAL at 08:24

## 2017-07-08 RX ADMIN — TRAMADOL HYDROCHLORIDE 75 MG: 50 TABLET, COATED ORAL at 13:28

## 2017-07-08 RX ADMIN — DULOXETINE HYDROCHLORIDE 30 MG: 30 CAPSULE, DELAYED RELEASE ORAL at 08:24

## 2017-07-08 RX ADMIN — METFORMIN HYDROCHLORIDE 850 MG: 850 TABLET, FILM COATED ORAL at 08:23

## 2017-07-08 RX ADMIN — ACETAMINOPHEN 650 MG: 325 TABLET, FILM COATED ORAL at 08:24

## 2017-07-08 RX ADMIN — LEVOTHYROXINE SODIUM 50 MCG: 50 TABLET ORAL at 08:24

## 2017-07-08 RX ADMIN — ATORVASTATIN CALCIUM 10 MG: 10 TABLET, FILM COATED ORAL at 08:23

## 2017-07-08 NOTE — PLAN OF CARE
Problem: Patient Care Overview (Adult)  Goal: Plan of Care Review  Outcome: Ongoing (interventions implemented as appropriate)    07/08/17 0436   Coping/Psychosocial Response Interventions   Plan Of Care Reviewed With patient   Patient Care Overview   Progress progress toward functional goals as expected         Problem: Perioperative Period (Adult)  Goal: Signs and Symptoms of Listed Potential Problems Will be Absent or Manageable (Perioperative Period)  Outcome: Ongoing (interventions implemented as appropriate)    07/08/17 0436   Perioperative Period   Problems Assessed (Perioperative Period) all   Problems Present (Perioperative Period) pain         Problem: Fall Risk (Adult)  Goal: Absence of Falls  Outcome: Ongoing (interventions implemented as appropriate)    07/08/17 0436   Fall Risk (Adult)   Absence of Falls making progress toward outcome

## 2017-07-08 NOTE — PROGRESS NOTES
"NEUROSURGERY PROGRESS NOTE     LOS: 3 days   Patient Care Team:  Hernan Thompson MD as PCP - General (Internal Medicine)  Hernan Thompson MD as Referring Physician (Internal Medicine)    Chief Complaint: Back and right leg pain.    POD#: 3 Days Post-Op  Procedures:  4-5 PLIF.    Interval History:   The patient feels much better.  She is ambulatory and voiding.  Patient Complaints: None.  Patient Denies: Headache, weakness, or numbness.    Vital Signs: Blood pressure 113/58, pulse 86, temperature 97 °F (36.1 °C), temperature source Oral, resp. rate 16, height 62\" (157.5 cm), weight 136 lb (61.7 kg), SpO2 96 %.  Intake/Output:   Intake/Output Summary (Last 24 hours) at 07/08/17 0808  Last data filed at 07/08/17 0512   Gross per 24 hour   Intake              120 ml   Output              400 ml   Net             -280 ml       Physical Exam:  The patient is sitting up and eating breakfast.  She is in good spirits.  There is modest heme drainage on her dressing.  Motor and sensory function are intact in her lower extremities.     Data Review:      Results from last 7 days  Lab Units 07/07/17  1832   SODIUM mmol/L 132   POTASSIUM mmol/L 3.9   CHLORIDE mmol/L 95*   CO2 mmol/L 25.0   BUN mg/dL 30*   CREATININE mg/dL 1.80*   GLUCOSE mg/dL 177*   CALCIUM mg/dL 9.0       Results from last 7 days  Lab Units 07/08/17  0527   HEMOGLOBIN g/dL 8.0*   HEMATOCRIT % 25.7*         Assessment/Plan:  1. Recurrent L4-5 disc herniation with segmental instability status post additional decompression, fusion, stabilization.  2. Intraoperative dural rent with CSF leak: Mobilize.  3. Mild encephalopathy: Probably related to medication and anesthesia. Discontinue pain medications and observe.  4. Mild anemia: Stable.  5. Disposition: Home today.      Vick Dhaliwal MD  07/08/17  8:08 AM    "

## 2017-07-09 LAB
BACTERIA SPEC AEROBE CULT: NORMAL
GRAM STN SPEC: NORMAL

## 2017-07-10 NOTE — THERAPY DISCHARGE NOTE
Acute Care - Occupational Therapy Discharge Summary  [facility]     Patient Name: Paulina Johnson  : 1945  MRN: [id number]    Today's Date: 7/10/2017  Onset of Illness/Injury or Date of Surgery Date: 17    Date of Referral to OT: 17  Referring Physician: MD Isra      Admit Date: 2017        OT Recommendation and Plan    Visit Dx:    ICD-10-CM ICD-9-CM   1. Impaired functional mobility, balance, gait, and endurance Z74.09 V49.89   2. Lumbar spine instability M53.2X6 724.9   3. Impaired mobility and ADLs Z74.09 799.89   4. Recurrent herniation of lumbar disc M51.26 722.10                     OT Goals       17 1243 17 1323       Bed Mobility OT LTG    Bed Mobility OT LTG, Time to Achieve  5 - 7 days  -ST     Bed Mobility OT LTG, Activity Type  supine to sit/sit to supine  -ST     Bed Mobility OT LTG, Assist Device  bed rails  -ST     Bed Mobility OT LTG, Additional Goal  log roll   -ST     Bed Mobility OT LTG, Outcome goal ongoing  -AN goal ongoing  -ST     Transfer Training OT LTG    Transfer Training OT LTG, Time to Achieve  5 - 7 days  -ST     Transfer Training OT LTG, Activity Type  toilet;sit to stand/stand to sit  -ST     Transfer Training OT LTG, [place] Level  supervision required  -ST     Transfer Training OT LTG, Assist Device  walker, rolling  -ST     Transfer Training OT LTG, Outcome goal ongoing  -AN goal ongoing  -ST     Toileting OT LTG    Toileting Goal OT LTG, Time to Achieve  5 - 7 days  -ST     Toileting Goal OT LTG, [place] Level  supervision required  -ST     Toileting Goal OT LTG, Additional Goal  hygiene and clothing management   -ST     Toileting Goal OT LTG, Outcome goal ongoing  -AN goal ongoing  -ST     LB Dressing OT LTG    LB Dressing Goal OT LTG, Time to Achieve  5 - 7 days  -ST     LB Dressing Goal OT LTG, [place] Level  minimum assist (75% patient effort)  -ST     LB Dressing Goal OT LTG, Adaptive Equipment  reacher;shoe horn,  long handled;sock-aid;laces, elastic  -ST     LB Dressing Goal OT LTG, Outcome goal ongoing  -AN goal ongoing  -ST       User Key  (r) = Recorded By, (t) = Taken By, (c) = Cosigned By    Initials Name Provider Type    ST Medina Taylor, OTR Occupational Therapist    ALIS Fox, OT Occupational Therapist                  OT Discharge Summary  Reason for Discharge: Discharge from facility  Outcomes Achieved: Refer to plan of care for updates on goals achieved  Discharge Destination: Home      Fabienne Lovett, OT  7/10/2017

## 2017-07-10 NOTE — THERAPY DISCHARGE NOTE
Acute Care - Physical Therapy Discharge Summary  [facility]       Patient Name: Paulina Johnson  : 1945  MRN: [id number]    Today's Date: 7/10/2017  Onset of Illness/Injury or Date of Surgery Date: 17    Date of Referral to PT: 17  Referring Physician: MD Isra      Admit Date: 2017      PT Recommendation and Plan    Visit Dx:    ICD-10-CM ICD-9-CM   1. Impaired functional mobility, balance, gait, and endurance Z74.09 V49.89   2. Lumbar spine instability M53.2X6 724.9   3. Impaired mobility and ADLs Z74.09 799.89   4. Recurrent herniation of lumbar disc M51.26 722.10             Outcome Measures       17 1104 17 1100       How much help from another person do you currently need...    Turning from your back to your side while in flat bed without using bedrails?  3  -LM     Moving from lying on back to sitting on the side of a flat bed without bedrails?  3  -LM     Moving to and from a bed to a chair (including a wheelchair)?  3  -LM     Standing up from a chair using your arms (e.g., wheelchair, bedside chair)?  3  -LM     Climbing 3-5 steps with a railing?  2  -LM     To walk in hospital room?  3  -LM     AM-PAC 6 Clicks Score  17  -LM     How much help from another is currently needed...    Putting on and taking off regular lower body clothing? 1  -AN      Bathing (including washing, rinsing, and drying) 2  -AN      Toileting (which includes using toilet bed pan or urinal) 2  -AN      Putting on and taking off regular upper body clothing 3  -AN      Taking care of personal grooming (such as brushing teeth) 3  -AN      Eating meals 4  -AN      Score 15  -AN      Functional Assessment    Outcome Measure Options  AM-PAC 6 Clicks Basic Mobility (PT)  -LM       User Key  (r) = Recorded By, (t) = Taken By, (c) = Cosigned By    Initials Name Provider Type    AN Santa Fox, OT Occupational Therapist    KANWAL Fraser, PT Physical Therapist                      IP PT Goals        07/07/17 1145 07/06/17 1322       Bed Mobility PT LTG    Bed Mobility PT LTG, Date Established  07/06/17  -LM     Bed Mobility PT LTG, Time to Achieve  5 days  -LM     Bed Mobility PT LTG, Activity Type  supine to sit/sit to supine  -LM     Bed Mobility PT LTG, [place] Level  conditional independence  -LM     Bed Mobility PT Goal  LTG, Assist Device  --   no AD  -LM     Bed Mobility PT LTG, Date Goal Reviewed 07/07/17  -LM      Bed Mobility PT LTG, Outcome goal ongoing  -LM      Transfer Training PT LTG    Transfer Training PT LTG, Date Established  07/06/17  -LM     Transfer Training PT LTG, Time to Achieve  5 days  -LM     Transfer Training PT LTG, Activity Type  sit to stand/stand to sit  -LM     Transfer Training PT LTG, [place] Level  conditional independence  -LM     Transfer Training PT LTG, Assist Device  walker, rolling  -LM     Transfer Training PT  LTG, Date Goal Reviewed 07/07/17  -LM      Transfer Training PT LTG, Outcome goal ongoing  -LM      Gait Training PT LTG    Gait Training Goal PT LTG, Date Established  07/06/17  -LM     Gait Training Goal PT LTG, Time to Achieve  5 days  -LM     Gait Training Goal PT LTG, [place] Level  conditional independence  -LM     Gait Training Goal PT LTG, Assist Device  walker, rolling  -LM     Gait Training Goal PT LTG, Distance to Achieve  750 feet  -LM     Gait Training Goal PT LTG, Date Goal Reviewed 07/07/17  -LM      Gait Training Goal PT LTG, Outcome goal ongoing  -LM        User Key  (r) = Recorded By, (t) = Taken By, (c) = Cosigned By    Initials Name Provider Type    KANWAL Fraser, PT Physical Therapist            Goals Status: Treatment plan discontinued secondary to discharge from acute facility.  PT Discharge Summary  Reason for Discharge: Discharge from facility  Outcomes Achieved: Refer to plan of care for updates on goals achieved  Discharge Destination: Home with assist      Loida Spencer, PT   7/10/2017

## 2017-07-13 NOTE — DISCHARGE SUMMARY
[facility]    NEUROSURGICAL DISCHARGE SUMMARY    Patient: Paulina Johnson   : 1945   MRN: [id number]    Date of Admission: 2017  Date of Discharge:  2017    Attending Physician: Vick Dhaliwal MD  Primary Care Provider: Hernan Thompson MD    Discharge Diagnosis:  Lumbar spine instability s/p   Impaired functional mobility, balance, gait, and endurance (Primary)  Impaired mobility and ADLs  Recurrent herniation of lumbar disc      Procedures Performed  TLIF L4/5  1. Arthrodesis interbody-type L4-5  2. Bilateral L4-5 laminotomies, partial facetectomies, foraminotomies  3. Bilateral L4-5 discectomy with capstone cage placement bilaterally (redo on the right)  4. Nonsegmental pedicle screw fixation L4-5 using PEEK rods  5. Extensive neural lysis  6. Use of [place] and local autograft  7. Stealth frameless stereotaxy used in conjunction with O-arm imaging.    Presenting Problem:  Lumbar spine instability [M53.2X6]    History of Present Illness:  Patient is a 71 y.o. woman who presented with low back and right leg pain.  The pain extends from her back into the right lateral calf and anterior shin and is worse with weightbearing.  Imaging revealed significant subacute Modic endplate changes at L4-5 suggestive of an early recurrence of disc herniation into the recess and proximal foramen on the right at L4-5. She has undergone two previous lumbar discectomies on the right at L4-5.  She presents at this time for lumbar redo discectomy with fusion and stabilization.    Hospital Course:  Upon admission to Neurosurgery at [facility] on 2017, the patient underwent a L4-5 TLIF.  The surgery was complicated by a dural rent on the right at L4-5, which was successfully repaired using a prolene suture.  She was taken to the recovery room in stable and satisfactory condition.  Her vitals remained stable and she was transferred to  the medical floor for further observation.  During her hospitalization, her hemoglobin and hematocrit dropped which were improving before discharge.  She refused to have her blood drawn to recheck her levels on POD 2.  She developed was reported by nursing to have some confusion and mood changes which improved once the pain medication was discontinued.  Motor and sensory function in lower extremities were intact after surgery and throughout hospital stay.  She denied any headaches throughout her hospital stay.  Patient's drain was d/c'd and wound remained dry and intact.  Patient was ambulatory, voiding independently, and pain controlled with oral medications at time of discharge on 7/8/2017.      Pertinent Test Results:   Lab Results   Component Value Date    WBC 10.06 06/30/2017    HGB 8.0 (L) 07/08/2017    HCT 25.7 (L) 07/08/2017    MCV 85.8 06/30/2017     (H) 06/30/2017      Lab Results   Component Value Date    GLUCOSE 177 (H) 07/07/2017    CALCIUM 9.0 07/07/2017     07/07/2017    K 3.9 07/07/2017    CO2 25.0 07/07/2017    CL 95 (L) 07/07/2017    BUN 30 (H) 07/07/2017    CREATININE 1.80 (H) 07/07/2017    EGFRIFNONA 28 (L) 07/07/2017    BCR 16.7 07/07/2017    ANIONGAP 12.0 (H) 07/07/2017    No results found for: PTT No results found for: INR, PROTIME    Lab Results   Component Value Date    HGBA1C 9.60 (H) 06/30/2017               Discharge Disposition:  Patient is discharged home in stable and satisfactory condition on 7/8/2017 with written and verbal discharge home instructions.    DISCHARGE INSTRUCTIONS:     Notify the physician or go the ED for the following conditions:    Patient is to contact physician if she develops severe headache or increasing clear drainage from her incision site.    Resume plavix on 7/8/17    Discharge Activity:  Ambulate frequently at home.  Patient may sit as tolerated.  No lifting greater than 10 pounds.  No driving until follow-up.    Discharge Diet:     Regular    Wound Care:   Keep incision clean and dry.  Patient may change dressing as needed.  Patient may get incision wet in the shower beginning on 7/11.  No tub bathing or swimming until follow-up.    Follow-up Appointments:    Date Time Provider     7/18/2017 3:00 PM Dee Flores PA-C  For suture removal       Please schedule follow up with Hernan Thompson MD .  Specialty:  Internal Medicine  Contact information:  [address] [facility] [address] [phone]      DISCHARGE MEDICATIONS  STOP taking these medications       etodolac XL (LODINE XL) 600 MG 24 hr tablet Comments:   Reason for Stopping:         insulin aspart (novoLOG) 100 UNIT/ML injection Comments:   Reason for Stopping:               CONTINUE these medications which have CHANGED    Details   clopidogrel (PLAVIX) 75 MG tablet Take 1 tablet by mouth Daily. Resume in 1 week., Starting 7/8/2017, Until Discontinued, No Print      oxyCODONE-acetaminophen (PERCOCET) 5-325 MG per tablet Take 1 tablet by mouth 2 (Two) Times a Day As Needed (Pain)., Starting 7/8/2017, Until Discontinued, Print                 CONTINUE these medications which have NOT CHANGED    Details   aspirin 81 MG EC tablet Take 81 mg by mouth Daily., Until Discontinued, Historical Med      DULoxetine (CYMBALTA) 60 MG capsule Take 30 mg by mouth Daily., Until Discontinued, Historical Med      furosemide (LASIX) 40 MG tablet Take 40 mg by mouth Daily., Until Discontinued, Historical Med      insulin detemir (LEVEMIR) 100 UNIT/ML injection Inject 16 Units under the skin 2 (Two) Times a Day. Patient states that she takes 16 units BID, Until Discontinued, Historical Med      levothyroxine (SYNTHROID, LEVOTHROID) 50 MCG tablet Take 50 mcg by mouth Daily., Until Discontinued, Historical Med      metFORMIN (GLUCOPHAGE) 850 MG tablet Take 850 mg by mouth 3 (Three) Times a Day., Until Discontinued, Historical Med      raloxifene (EVISTA) 60 MG tablet Take 60 mg by mouth Daily.,  Until Discontinued, Historical Med      simvastatin (ZOCOR) 20 MG tablet Take 20 mg by mouth Daily., Until Discontinued, Historical Med      valsartan (DIOVAN) 320 MG tablet Take 320 mg by mouth Daily., Starting 6/12/2017, Until Discontinued, Historical Med      docusate sodium (COLACE) 100 MG capsule Take 1 capsule by mouth 3 (Three) Times a Day As Needed for Constipation., Starting 6/21/2017, Until Discontinued, Normal      polyethylene glycol (MIRALAX) powder Take 17 g by mouth 2 (Two) Times a Day., Starting 6/21/2017, Until Discontinued, Normal           Allergies:  Lortab [hydrocodone-acetaminophen]; Oxycontin [oxycodone hcl]; Azithromycin; Ciprofloxacin; Daypro [oxaprozin]; and Penicillins     Test Results Pending at Discharge   Order Current Status    AFB Culture Preliminary result    AFB Culture Preliminary result    Anaerobic Culture Preliminary result    Fungus Culture Preliminary result    Fungus Culture Preliminary result    Tissue/Bone Culture Preliminary result          Discharge plans and medications managed under the direction of Vick Dhaliwal MD.    Time: 31 minutes     WAQAR Ppier MD  [facility]  07/13/17  9:40 AM

## 2017-07-18 ENCOUNTER — OFFICE VISIT (OUTPATIENT)
Dept: NEUROSURGERY | Facility: CLINIC | Age: 72
End: 2017-07-18

## 2017-07-18 VITALS — BODY MASS INDEX: 24.84 KG/M2 | HEIGHT: 62 IN | WEIGHT: 135 LBS | TEMPERATURE: 97.4 F

## 2017-07-18 DIAGNOSIS — M53.2X6 LUMBAR SPINE INSTABILITY: ICD-10-CM

## 2017-07-18 DIAGNOSIS — M48.062 LUMBAR STENOSIS WITH NEUROGENIC CLAUDICATION: ICD-10-CM

## 2017-07-18 DIAGNOSIS — M51.26 RECURRENT HERNIATION OF LUMBAR DISC: ICD-10-CM

## 2017-07-18 DIAGNOSIS — Z98.1 STATUS POST LUMBAR SPINAL FUSION: Primary | ICD-10-CM

## 2017-07-18 PROBLEM — G96.11 DURAL TEAR: Status: ACTIVE | Noted: 2017-07-18

## 2017-07-18 PROCEDURE — 99024 POSTOP FOLLOW-UP VISIT: CPT | Performed by: PHYSICIAN ASSISTANT
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
## 2017-07-19 LAB
BACTERIA SPEC AEROBE CULT: NORMAL
BACTERIA SPEC ANAEROBE CULT: NORMAL
GRAM STN SPEC: NORMAL

## 2017-08-01 ENCOUNTER — OFFICE VISIT (OUTPATIENT)
Dept: NEUROSURGERY | Facility: CLINIC | Age: 72
End: 2017-08-01

## 2017-08-01 ENCOUNTER — HOSPITAL ENCOUNTER (OUTPATIENT)
Dept: GENERAL RADIOLOGY | Facility: HOSPITAL | Age: 72
Discharge: HOME | End: 2017-08-01
Admitting: PHYSICIAN ASSISTANT

## 2017-08-01 VITALS
TEMPERATURE: 97.7 F | SYSTOLIC BLOOD PRESSURE: 152 MMHG | BODY MASS INDEX: 24.29 KG/M2 | DIASTOLIC BLOOD PRESSURE: 58 MMHG | HEIGHT: 62 IN | WEIGHT: 132 LBS

## 2017-08-01 DIAGNOSIS — B37.31 CANDIDA VAGINITIS: ICD-10-CM

## 2017-08-01 DIAGNOSIS — K59.03 DRUG-INDUCED CONSTIPATION: ICD-10-CM

## 2017-08-01 DIAGNOSIS — G96.11 DURAL TEAR: ICD-10-CM

## 2017-08-01 DIAGNOSIS — Z98.1 STATUS POST LUMBAR SPINAL FUSION: ICD-10-CM

## 2017-08-01 DIAGNOSIS — M53.2X6 LUMBAR SPINE INSTABILITY: ICD-10-CM

## 2017-08-01 DIAGNOSIS — R11.2 NAUSEA AND VOMITING, INTRACTABILITY OF VOMITING NOT SPECIFIED, UNSPECIFIED VOMITING TYPE: ICD-10-CM

## 2017-08-01 DIAGNOSIS — M51.26 RECURRENT HERNIATION OF LUMBAR DISC: ICD-10-CM

## 2017-08-01 DIAGNOSIS — M48.062 LUMBAR STENOSIS WITH NEUROGENIC CLAUDICATION: Primary | ICD-10-CM

## 2017-08-01 PROCEDURE — 99024 POSTOP FOLLOW-UP VISIT: CPT | Performed by: PHYSICIAN ASSISTANT

## 2017-08-01 PROCEDURE — 72100 X-RAY EXAM L-S SPINE 2/3 VWS: CPT

## 2017-08-01 RX ORDER — FLUCONAZOLE 150 MG/1
150 TABLET ORAL ONCE
Qty: 1 TABLET | Refills: 0 | Status: SHIPPED | OUTPATIENT
Start: 2017-08-01 | End: 2017-08-01

## 2017-08-01 RX ORDER — FLUCONAZOLE 150 MG/1
150 TABLET ORAL ONCE
Qty: 1 TABLET | Refills: 0 | Status: SHIPPED | OUTPATIENT
Start: 2017-08-01 | End: 2017-08-01 | Stop reason: SDUPTHER
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
## 2017-08-01 NOTE — PATIENT INSTRUCTIONS
1. Administer enema when get home  2. Taper down pain medication  3. Eat full meal and drink glass of water when taking Aleve  4. Stay hydrated with non-caffeinated beverages   5. Continue to take Zofran as needed for vomiting   6. Take Diflucan tomorrow

## 2017-08-15 ENCOUNTER — LAB (OUTPATIENT)
Dept: LAB | Facility: HOSPITAL | Age: 72
End: 2017-08-15

## 2017-08-15 ENCOUNTER — APPOINTMENT (OUTPATIENT)
Dept: GENERAL RADIOLOGY | Facility: HOSPITAL | Age: 72
End: 2017-08-15

## 2017-08-15 ENCOUNTER — OFFICE VISIT (OUTPATIENT)
Dept: NEUROSURGERY | Facility: CLINIC | Age: 72
End: 2017-08-15

## 2017-08-15 ENCOUNTER — DOCUMENTATION (OUTPATIENT)
Dept: NEUROSURGERY | Facility: CLINIC | Age: 72
End: 2017-08-15

## 2017-08-15 ENCOUNTER — HOSPITAL ENCOUNTER (INPATIENT)
Facility: HOSPITAL | Age: 72
LOS: 7 days | Discharge: NURSING, REHAB OR LONG-TERM CARE | End: 2017-08-22
Attending: EMERGENCY MEDICINE | Admitting: HOSPITALIST

## 2017-08-15 VITALS
SYSTOLIC BLOOD PRESSURE: 116 MMHG | TEMPERATURE: 97.9 F | WEIGHT: 129 LBS | HEIGHT: 62 IN | DIASTOLIC BLOOD PRESSURE: 60 MMHG | BODY MASS INDEX: 23.74 KG/M2

## 2017-08-15 DIAGNOSIS — M51.26 RECURRENT HERNIATION OF LUMBAR DISC: ICD-10-CM

## 2017-08-15 DIAGNOSIS — N17.9 AKI (ACUTE KIDNEY INJURY) (HCC): ICD-10-CM

## 2017-08-15 DIAGNOSIS — Z74.09 IMPAIRED MOBILITY AND ADLS: ICD-10-CM

## 2017-08-15 DIAGNOSIS — Z98.1 STATUS POST LUMBAR SPINAL FUSION: Primary | ICD-10-CM

## 2017-08-15 DIAGNOSIS — E11.10 TYPE 2 DIABETES MELLITUS WITH KETOACIDOSIS WITHOUT COMA, UNSPECIFIED LONG TERM INSULIN USE STATUS: Primary | ICD-10-CM

## 2017-08-15 DIAGNOSIS — M48.062 LUMBAR STENOSIS WITH NEUROGENIC CLAUDICATION: ICD-10-CM

## 2017-08-15 DIAGNOSIS — Z74.09 IMPAIRED FUNCTIONAL MOBILITY, BALANCE, GAIT, AND ENDURANCE: ICD-10-CM

## 2017-08-15 DIAGNOSIS — Z98.1 STATUS POST LUMBAR SPINAL FUSION: ICD-10-CM

## 2017-08-15 DIAGNOSIS — Z78.9 IMPAIRED MOBILITY AND ADLS: ICD-10-CM

## 2017-08-15 DIAGNOSIS — R11.2 NAUSEA AND VOMITING, INTRACTABILITY OF VOMITING NOT SPECIFIED, UNSPECIFIED VOMITING TYPE: ICD-10-CM

## 2017-08-15 DIAGNOSIS — R41.0 CONFUSION: ICD-10-CM

## 2017-08-15 LAB
ALBUMIN SERPL-MCNC: 4 G/DL (ref 3.2–4.8)
ALBUMIN SERPL-MCNC: 4.3 G/DL (ref 3.2–4.8)
ALBUMIN/GLOB SERPL: 1.3 G/DL (ref 1.5–2.5)
ALBUMIN/GLOB SERPL: 1.4 G/DL (ref 1.5–2.5)
ALP SERPL-CCNC: 141 U/L (ref 25–100)
ALP SERPL-CCNC: 148 U/L (ref 25–100)
ALT SERPL W P-5'-P-CCNC: 14 U/L (ref 7–40)
ALT SERPL W P-5'-P-CCNC: 17 U/L (ref 7–40)
ANION GAP SERPL CALCULATED.3IONS-SCNC: 27 MMOL/L (ref 3–11)
ANION GAP SERPL CALCULATED.3IONS-SCNC: 29 MMOL/L (ref 3–11)
ANION GAP SERPL CALCULATED.3IONS-SCNC: 29 MMOL/L (ref 3–11)
APTT PPP: <24 SECONDS (ref 24–31)
AST SERPL-CCNC: 15 U/L (ref 0–33)
AST SERPL-CCNC: 17 U/L (ref 0–33)
ATMOSPHERIC PRESS: ABNORMAL MMHG
B-OH-BUTYR SERPL-SCNC: 10.22 MMOL/L
BASE EXCESS BLDV CALC-SCNC: -16.3 MMOL/L (ref -2–2)
BASOPHILS # BLD AUTO: 0.04 10*3/MM3 (ref 0–0.2)
BASOPHILS # BLD AUTO: 0.05 10*3/MM3 (ref 0–0.2)
BASOPHILS # BLD MANUAL: 0 10*3/MM3 (ref 0–0.2)
BASOPHILS NFR BLD AUTO: 0 % (ref 0–1)
BASOPHILS NFR BLD AUTO: 0 % (ref 0–1)
BASOPHILS NFR BLD AUTO: 0.3 % (ref 0–1)
BDY SITE: ABNORMAL
BILIRUB SERPL-MCNC: 0.1 MG/DL (ref 0.3–1.2)
BILIRUB SERPL-MCNC: 0.2 MG/DL (ref 0.3–1.2)
BILIRUB UR QL STRIP: ABNORMAL
BUN BLD-MCNC: 45 MG/DL (ref 9–23)
BUN BLD-MCNC: 47 MG/DL (ref 9–23)
BUN BLD-MCNC: 47 MG/DL (ref 9–23)
BUN/CREAT SERPL: 17.4 (ref 7–25)
BUN/CREAT SERPL: 19.6 (ref 7–25)
BUN/CREAT SERPL: 19.6 (ref 7–25)
CALCIUM SPEC-SCNC: 9 MG/DL (ref 8.7–10.4)
CALCIUM SPEC-SCNC: 9.2 MG/DL (ref 8.7–10.4)
CALCIUM SPEC-SCNC: 9.4 MG/DL (ref 8.7–10.4)
CHLORIDE SERPL-SCNC: 79 MMOL/L (ref 99–109)
CHLORIDE SERPL-SCNC: 81 MMOL/L (ref 99–109)
CHLORIDE SERPL-SCNC: 86 MMOL/L (ref 99–109)
CLARITY UR: CLEAR
CO2 BLDA-SCNC: 11.2 MMOL/L (ref 22–33)
CO2 SERPL-SCNC: 10 MMOL/L (ref 20–31)
CO2 SERPL-SCNC: 11 MMOL/L (ref 20–31)
CO2 SERPL-SCNC: 12 MMOL/L (ref 20–31)
COHGB MFR BLD: 1.4 % (ref 0–2)
COLOR UR: YELLOW
CREAT BLD-MCNC: 2.3 MG/DL (ref 0.6–1.3)
CREAT BLD-MCNC: 2.4 MG/DL (ref 0.6–1.3)
CREAT BLD-MCNC: 2.7 MG/DL (ref 0.6–1.3)
CRP SERPL-MCNC: 2.47 MG/DL (ref 0–1)
D-LACTATE SERPL-SCNC: 1.4 MMOL/L (ref 0.5–2)
DEPRECATED RDW RBC AUTO: 54.2 FL (ref 37–54)
DEPRECATED RDW RBC AUTO: 55.5 FL (ref 37–54)
EOSINOPHIL # BLD AUTO: 0 10*3/MM3 (ref 0–0.3)
EOSINOPHIL # BLD AUTO: 0.03 10*3/MM3 (ref 0–0.3)
EOSINOPHIL # BLD MANUAL: 0 10*3/MM3 (ref 0.1–0.3)
EOSINOPHIL NFR BLD AUTO: 0 % (ref 0–3)
EOSINOPHIL NFR BLD AUTO: 0.3 % (ref 0–3)
EOSINOPHIL NFR BLD MANUAL: 0 % (ref 0–3)
ERYTHROCYTE [DISTWIDTH] IN BLOOD BY AUTOMATED COUNT: 16.5 % (ref 11.3–14.5)
ERYTHROCYTE [DISTWIDTH] IN BLOOD BY AUTOMATED COUNT: 16.6 % (ref 11.3–14.5)
ERYTHROCYTE [SEDIMENTATION RATE] IN BLOOD: 106 MM/HR (ref 0–30)
GFR SERPL CREATININE-BSD FRML MDRD: 17 ML/MIN/1.73
GFR SERPL CREATININE-BSD FRML MDRD: 20 ML/MIN/1.73
GFR SERPL CREATININE-BSD FRML MDRD: 21 ML/MIN/1.73
GLOBULIN UR ELPH-MCNC: 2.8 GM/DL
GLOBULIN UR ELPH-MCNC: 3.2 GM/DL
GLUCOSE BLD-MCNC: 1047 MG/DL (ref 70–100)
GLUCOSE BLD-MCNC: 918 MG/DL (ref 70–100)
GLUCOSE BLD-MCNC: 942 MG/DL (ref 70–100)
GLUCOSE BLDC GLUCOMTR-MCNC: 517 MG/DL (ref 70–130)
GLUCOSE UR STRIP-MCNC: ABNORMAL MG/DL
HBA1C MFR BLD: 11 % (ref 4.8–5.6)
HCO3 BLDV-SCNC: 10.3 MMOL/L (ref 22–28)
HCT VFR BLD AUTO: 28.9 % (ref 34.5–44)
HCT VFR BLD AUTO: 29.3 % (ref 34.5–44)
HGB BLD-MCNC: 8.3 G/DL (ref 11.5–15.5)
HGB BLD-MCNC: 8.4 G/DL (ref 11.5–15.5)
HGB BLDA-MCNC: 8.3 G/DL (ref 14–18)
HGB UR QL STRIP.AUTO: NEGATIVE
HOLD SPECIMEN: NORMAL
HOLD SPECIMEN: NORMAL
HOROWITZ INDEX BLD+IHG-RTO: 21 %
IMM GRANULOCYTES # BLD: 0.36 10*3/MM3 (ref 0–0.03)
IMM GRANULOCYTES # BLD: 0.44 10*3/MM3 (ref 0–0.03)
IMM GRANULOCYTES NFR BLD: 3.7 % (ref 0–0.6)
IMM GRANULOCYTES NFR BLD: 7 % (ref 0–0.6)
INR PPP: 0.93
KETONES UR QL STRIP: ABNORMAL
LEUKOCYTE ESTERASE UR QL STRIP.AUTO: NEGATIVE
LYMPHOCYTES # BLD AUTO: 1.12 10*3/MM3 (ref 0.6–4.8)
LYMPHOCYTES # BLD AUTO: 1.92 10*3/MM3 (ref 0.6–4.8)
LYMPHOCYTES # BLD MANUAL: 1.3 10*3/MM3 (ref 0.6–4.8)
LYMPHOCYTES NFR BLD AUTO: 10 % (ref 24–44)
LYMPHOCYTES NFR BLD AUTO: 16 % (ref 24–44)
LYMPHOCYTES NFR BLD MANUAL: 10 % (ref 24–44)
LYMPHOCYTES NFR BLD MANUAL: 2 % (ref 0–12)
MAGNESIUM SERPL-MCNC: 2.3 MG/DL (ref 1.3–2.7)
MCH RBC QN AUTO: 25.5 PG (ref 27–31)
MCH RBC QN AUTO: 25.8 PG (ref 27–31)
MCHC RBC AUTO-ENTMCNC: 28.7 G/DL (ref 32–36)
MCHC RBC AUTO-ENTMCNC: 28.7 G/DL (ref 32–36)
MCV RBC AUTO: 88.9 FL (ref 80–99)
MCV RBC AUTO: 89.9 FL (ref 80–99)
METAMYELOCYTES NFR BLD MANUAL: 1 % (ref 0–0)
METHGB BLD QL: 1.7 % (ref 0–1.5)
MODALITY: ABNORMAL
MONOCYTES # BLD AUTO: 0.26 10*3/MM3 (ref 0–1)
MONOCYTES # BLD AUTO: 0.65 10*3/MM3 (ref 0–1)
MONOCYTES # BLD AUTO: 0.82 10*3/MM3 (ref 0–1)
MONOCYTES NFR BLD AUTO: 2 % (ref 0–12)
MONOCYTES NFR BLD AUTO: 6.8 % (ref 0–12)
MYELOCYTES NFR BLD MANUAL: 1 % (ref 0–0)
NEUTROPHILS # BLD AUTO: 10.85 10*3/MM3 (ref 1.5–8.3)
NEUTROPHILS # BLD AUTO: 11.21 10*3/MM3 (ref 1.5–8.3)
NEUTROPHILS # BLD AUTO: 8.74 10*3/MM3 (ref 1.5–8.3)
NEUTROPHILS NFR BLD AUTO: 72.9 % (ref 41–71)
NEUTROPHILS NFR BLD AUTO: 81 % (ref 41–71)
NEUTROPHILS NFR BLD MANUAL: 81 % (ref 41–71)
NEUTS BAND NFR BLD MANUAL: 5 % (ref 0–5)
NITRITE UR QL STRIP: NEGATIVE
OXYHGB MFR BLDV: 68.9 % (ref 94–99)
PCO2 BLDV: 26.9 MM HG (ref 41–51)
PH BLDV: 7.19 [PH] (ref 7.25–7.5)
PH UR STRIP.AUTO: <=5 [PH] (ref 5–8)
PHOSPHATE SERPL-MCNC: 6.4 MG/DL (ref 2.4–5.1)
PLAT MORPH BLD: NORMAL
PLAT MORPH BLD: NORMAL
PLATELET # BLD AUTO: 784 10*3/MM3 (ref 150–450)
PLATELET # BLD AUTO: 793 10*3/MM3 (ref 150–450)
PMV BLD AUTO: 8.6 FL (ref 6–12)
PMV BLD AUTO: 8.7 FL (ref 6–12)
PO2 BLDV: 44.8 MM HG (ref 27–53)
POTASSIUM BLD-SCNC: 5.1 MMOL/L (ref 3.5–5.5)
POTASSIUM BLD-SCNC: 5.7 MMOL/L (ref 3.5–5.5)
POTASSIUM BLD-SCNC: 5.9 MMOL/L (ref 3.5–5.5)
PROCALCITONIN SERPL-MCNC: 0.82 NG/ML
PROCALCITONIN SERPL-MCNC: 0.96 NG/ML
PROT SERPL-MCNC: 6.8 G/DL (ref 5.7–8.2)
PROT SERPL-MCNC: 7.5 G/DL (ref 5.7–8.2)
PROT UR QL STRIP: NEGATIVE
PROTHROMBIN TIME: 10.1 SECONDS (ref 9.6–11.5)
RBC # BLD AUTO: 3.25 10*6/MM3 (ref 3.89–5.14)
RBC # BLD AUTO: 3.26 10*6/MM3 (ref 3.89–5.14)
RBC MORPH BLD: NORMAL
RBC MORPH BLD: NORMAL
SODIUM BLD-SCNC: 118 MMOL/L (ref 132–146)
SODIUM BLD-SCNC: 122 MMOL/L (ref 132–146)
SODIUM BLD-SCNC: 124 MMOL/L (ref 132–146)
SP GR UR STRIP: 1.03 (ref 1–1.03)
UROBILINOGEN UR QL STRIP: ABNORMAL
WBC MORPH BLD: NORMAL
WBC MORPH BLD: NORMAL
WBC NRBC COR # BLD: 11.99 10*3/MM3 (ref 3.5–10.8)
WBC NRBC COR # BLD: 13.03 10*3/MM3 (ref 3.5–10.8)
WHOLE BLOOD HOLD SPECIMEN: NORMAL
WHOLE BLOOD HOLD SPECIMEN: NORMAL

## 2017-08-15 PROCEDURE — 83605 ASSAY OF LACTIC ACID: CPT | Performed by: EMERGENCY MEDICINE

## 2017-08-15 PROCEDURE — 84100 ASSAY OF PHOSPHORUS: CPT | Performed by: NURSE PRACTITIONER

## 2017-08-15 PROCEDURE — 99024 POSTOP FOLLOW-UP VISIT: CPT | Performed by: PHYSICIAN ASSISTANT

## 2017-08-15 PROCEDURE — 81003 URINALYSIS AUTO W/O SCOPE: CPT | Performed by: EMERGENCY MEDICINE

## 2017-08-15 PROCEDURE — 85610 PROTHROMBIN TIME: CPT | Performed by: NURSE PRACTITIONER

## 2017-08-15 PROCEDURE — 85025 COMPLETE CBC W/AUTO DIFF WBC: CPT | Performed by: EMERGENCY MEDICINE

## 2017-08-15 PROCEDURE — 99291 CRITICAL CARE FIRST HOUR: CPT | Performed by: INTERNAL MEDICINE

## 2017-08-15 PROCEDURE — 82962 GLUCOSE BLOOD TEST: CPT

## 2017-08-15 PROCEDURE — 84100 ASSAY OF PHOSPHORUS: CPT | Performed by: EMERGENCY MEDICINE

## 2017-08-15 PROCEDURE — 82805 BLOOD GASES W/O2 SATURATION: CPT | Performed by: EMERGENCY MEDICINE

## 2017-08-15 PROCEDURE — 83735 ASSAY OF MAGNESIUM: CPT | Performed by: EMERGENCY MEDICINE

## 2017-08-15 PROCEDURE — 83735 ASSAY OF MAGNESIUM: CPT | Performed by: NURSE PRACTITIONER

## 2017-08-15 PROCEDURE — 84145 PROCALCITONIN (PCT): CPT | Performed by: NURSE PRACTITIONER

## 2017-08-15 PROCEDURE — 83036 HEMOGLOBIN GLYCOSYLATED A1C: CPT | Performed by: EMERGENCY MEDICINE

## 2017-08-15 PROCEDURE — 80053 COMPREHEN METABOLIC PANEL: CPT | Performed by: EMERGENCY MEDICINE

## 2017-08-15 PROCEDURE — 82330 ASSAY OF CALCIUM: CPT | Performed by: NURSE PRACTITIONER

## 2017-08-15 PROCEDURE — 82010 KETONE BODYS QUAN: CPT | Performed by: EMERGENCY MEDICINE

## 2017-08-15 PROCEDURE — 84145 PROCALCITONIN (PCT): CPT | Performed by: INTERNAL MEDICINE

## 2017-08-15 PROCEDURE — 99285 EMERGENCY DEPT VISIT HI MDM: CPT

## 2017-08-15 PROCEDURE — 85730 THROMBOPLASTIN TIME PARTIAL: CPT | Performed by: NURSE PRACTITIONER

## 2017-08-15 PROCEDURE — 25010000002 HEPARIN (PORCINE) PER 1000 UNITS: Performed by: NURSE PRACTITIONER

## 2017-08-15 PROCEDURE — 85007 BL SMEAR W/DIFF WBC COUNT: CPT | Performed by: EMERGENCY MEDICINE

## 2017-08-15 PROCEDURE — 25010000002 INSULIN REGULAR HUMAN PER 5 UNITS: Performed by: EMERGENCY MEDICINE

## 2017-08-15 PROCEDURE — 87040 BLOOD CULTURE FOR BACTERIA: CPT | Performed by: EMERGENCY MEDICINE

## 2017-08-15 PROCEDURE — 71010 HC CHEST PA OR AP: CPT

## 2017-08-15 RX ORDER — SODIUM CHLORIDE, SODIUM LACTATE, POTASSIUM CHLORIDE, CALCIUM CHLORIDE 600; 310; 30; 20 MG/100ML; MG/100ML; MG/100ML; MG/100ML
1000 INJECTION, SOLUTION INTRAVENOUS CONTINUOUS
Status: DISCONTINUED | OUTPATIENT
Start: 2017-08-15 | End: 2017-08-16

## 2017-08-15 RX ORDER — DEXTROSE MONOHYDRATE 25 G/50ML
12.5 INJECTION, SOLUTION INTRAVENOUS
Status: DISCONTINUED | OUTPATIENT
Start: 2017-08-15 | End: 2017-08-16

## 2017-08-15 RX ORDER — POTASSIUM CHLORIDE 1.5 G/1.77G
40 POWDER, FOR SOLUTION ORAL AS NEEDED
Status: DISCONTINUED | OUTPATIENT
Start: 2017-08-15 | End: 2017-08-16

## 2017-08-15 RX ORDER — POTASSIUM CHLORIDE 1.5 G/1.77G
10 POWDER, FOR SOLUTION ORAL AS NEEDED
Status: DISCONTINUED | OUTPATIENT
Start: 2017-08-15 | End: 2017-08-16

## 2017-08-15 RX ORDER — SODIUM CHLORIDE AND POTASSIUM CHLORIDE 150; 450 MG/100ML; MG/100ML
250 INJECTION, SOLUTION INTRAVENOUS CONTINUOUS PRN
Status: DISCONTINUED | OUTPATIENT
Start: 2017-08-15 | End: 2017-08-16

## 2017-08-15 RX ORDER — DEXTROSE, SODIUM CHLORIDE, AND POTASSIUM CHLORIDE 5; .45; .15 G/100ML; G/100ML; G/100ML
150 INJECTION INTRAVENOUS CONTINUOUS PRN
Status: DISCONTINUED | OUTPATIENT
Start: 2017-08-15 | End: 2017-08-16

## 2017-08-15 RX ORDER — MAGNESIUM SULFATE HEPTAHYDRATE 40 MG/ML
2 INJECTION, SOLUTION INTRAVENOUS AS NEEDED
Status: DISCONTINUED | OUTPATIENT
Start: 2017-08-15 | End: 2017-08-16

## 2017-08-15 RX ORDER — POLYETHYLENE GLYCOL 3350 17 G/17G
17 POWDER, FOR SOLUTION ORAL 2 TIMES DAILY PRN
Status: DISCONTINUED | OUTPATIENT
Start: 2017-08-15 | End: 2017-08-22 | Stop reason: HOSPADM

## 2017-08-15 RX ORDER — LEVOTHYROXINE SODIUM 0.05 MG/1
50 TABLET ORAL DAILY
Status: DISCONTINUED | OUTPATIENT
Start: 2017-08-16 | End: 2017-08-19

## 2017-08-15 RX ORDER — SODIUM CHLORIDE 9 MG/ML
30 INJECTION, SOLUTION INTRAVENOUS CONTINUOUS PRN
Status: DISCONTINUED | OUTPATIENT
Start: 2017-08-15 | End: 2017-08-17

## 2017-08-15 RX ORDER — POTASSIUM CHLORIDE 7.46 G/1000ML
10 INJECTION, SOLUTION INTRAVENOUS
Status: DISCONTINUED | OUTPATIENT
Start: 2017-08-15 | End: 2017-08-16

## 2017-08-15 RX ORDER — SODIUM CHLORIDE 9 MG/ML
250 INJECTION, SOLUTION INTRAVENOUS CONTINUOUS
Status: DISCONTINUED | OUTPATIENT
Start: 2017-08-15 | End: 2017-08-15

## 2017-08-15 RX ORDER — MAGNESIUM SULFATE HEPTAHYDRATE 40 MG/ML
4 INJECTION, SOLUTION INTRAVENOUS AS NEEDED
Status: DISCONTINUED | OUTPATIENT
Start: 2017-08-15 | End: 2017-08-16

## 2017-08-15 RX ORDER — CYCLOBENZAPRINE HCL 10 MG
10 TABLET ORAL 3 TIMES DAILY PRN
Qty: 45 TABLET | Refills: 0 | Status: ON HOLD | OUTPATIENT
Start: 2017-08-15 | End: 2017-08-16

## 2017-08-15 RX ORDER — DULOXETIN HYDROCHLORIDE 30 MG/1
30 CAPSULE, DELAYED RELEASE ORAL DAILY
Status: DISCONTINUED | OUTPATIENT
Start: 2017-08-16 | End: 2017-08-22 | Stop reason: HOSPADM

## 2017-08-15 RX ORDER — SODIUM CHLORIDE 450 MG/100ML
250 INJECTION, SOLUTION INTRAVENOUS CONTINUOUS
Status: DISCONTINUED | OUTPATIENT
Start: 2017-08-15 | End: 2017-08-15

## 2017-08-15 RX ORDER — SODIUM CHLORIDE 9 MG/ML
125 INJECTION, SOLUTION INTRAVENOUS CONTINUOUS
Status: DISCONTINUED | OUTPATIENT
Start: 2017-08-15 | End: 2017-08-15

## 2017-08-15 RX ORDER — SODIUM CHLORIDE 0.9 % (FLUSH) 0.9 %
1-10 SYRINGE (ML) INJECTION AS NEEDED
Status: DISCONTINUED | OUTPATIENT
Start: 2017-08-15 | End: 2017-08-22 | Stop reason: HOSPADM

## 2017-08-15 RX ORDER — RALOXIFENE HYDROCHLORIDE 60 MG/1
60 TABLET, FILM COATED ORAL DAILY
Status: DISCONTINUED | OUTPATIENT
Start: 2017-08-16 | End: 2017-08-22 | Stop reason: HOSPADM

## 2017-08-15 RX ORDER — POTASSIUM CHLORIDE 750 MG/1
40 CAPSULE, EXTENDED RELEASE ORAL AS NEEDED
Status: DISCONTINUED | OUTPATIENT
Start: 2017-08-15 | End: 2017-08-16

## 2017-08-15 RX ORDER — DEXTROSE AND SODIUM CHLORIDE 5; .45 G/100ML; G/100ML
150 INJECTION, SOLUTION INTRAVENOUS CONTINUOUS PRN
Status: DISCONTINUED | OUTPATIENT
Start: 2017-08-15 | End: 2017-08-16

## 2017-08-15 RX ORDER — METHYLPREDNISOLONE 4 MG/1
TABLET ORAL
Qty: 1 EACH | Refills: 0 | Status: SHIPPED | OUTPATIENT
Start: 2017-08-15 | End: 2017-08-15

## 2017-08-15 RX ORDER — ATORVASTATIN CALCIUM 10 MG/1
10 TABLET, FILM COATED ORAL NIGHTLY
Status: DISCONTINUED | OUTPATIENT
Start: 2017-08-15 | End: 2017-08-22 | Stop reason: HOSPADM

## 2017-08-15 RX ORDER — ONDANSETRON 2 MG/ML
4 INJECTION INTRAMUSCULAR; INTRAVENOUS EVERY 6 HOURS PRN
Status: DISCONTINUED | OUTPATIENT
Start: 2017-08-15 | End: 2017-08-22 | Stop reason: HOSPADM

## 2017-08-15 RX ORDER — ASPIRIN 81 MG/1
81 TABLET ORAL DAILY
Status: DISCONTINUED | OUTPATIENT
Start: 2017-08-16 | End: 2017-08-22 | Stop reason: HOSPADM

## 2017-08-15 RX ORDER — CYCLOBENZAPRINE HCL 10 MG
10 TABLET ORAL 3 TIMES DAILY PRN
Status: DISCONTINUED | OUTPATIENT
Start: 2017-08-15 | End: 2017-08-22 | Stop reason: HOSPADM

## 2017-08-15 RX ORDER — HEPARIN SODIUM 5000 [USP'U]/ML
5000 INJECTION, SOLUTION INTRAVENOUS; SUBCUTANEOUS EVERY 8 HOURS SCHEDULED
Status: DISCONTINUED | OUTPATIENT
Start: 2017-08-15 | End: 2017-08-22 | Stop reason: HOSPADM

## 2017-08-15 RX ORDER — CLOPIDOGREL BISULFATE 75 MG/1
75 TABLET ORAL DAILY
Status: DISCONTINUED | OUTPATIENT
Start: 2017-08-16 | End: 2017-08-22 | Stop reason: HOSPADM

## 2017-08-15 RX ORDER — SODIUM CHLORIDE 0.9 % (FLUSH) 0.9 %
30 SYRINGE (ML) INJECTION ONCE AS NEEDED
Status: DISCONTINUED | OUTPATIENT
Start: 2017-08-15 | End: 2017-08-22 | Stop reason: HOSPADM

## 2017-08-15 RX ORDER — POTASSIUM CHLORIDE 750 MG/1
10 CAPSULE, EXTENDED RELEASE ORAL AS NEEDED
Status: DISCONTINUED | OUTPATIENT
Start: 2017-08-15 | End: 2017-08-16

## 2017-08-15 RX ORDER — POTASSIUM CHLORIDE 1.5 G/1.77G
20 POWDER, FOR SOLUTION ORAL AS NEEDED
Status: DISCONTINUED | OUTPATIENT
Start: 2017-08-15 | End: 2017-08-16

## 2017-08-15 RX ORDER — SODIUM CHLORIDE 0.9 % (FLUSH) 0.9 %
10 SYRINGE (ML) INJECTION AS NEEDED
Status: DISCONTINUED | OUTPATIENT
Start: 2017-08-15 | End: 2017-08-22 | Stop reason: HOSPADM

## 2017-08-15 RX ORDER — FAMOTIDINE 20 MG/1
20 TABLET, FILM COATED ORAL 2 TIMES DAILY
Status: DISCONTINUED | OUTPATIENT
Start: 2017-08-15 | End: 2017-08-22 | Stop reason: HOSPADM

## 2017-08-15 RX ORDER — POTASSIUM CHLORIDE 750 MG/1
20 CAPSULE, EXTENDED RELEASE ORAL AS NEEDED
Status: DISCONTINUED | OUTPATIENT
Start: 2017-08-15 | End: 2017-08-16

## 2017-08-15 RX ADMIN — POTASSIUM CHLORIDE 250 ML/HR: 149 INJECTION, SOLUTION, CONCENTRATE INTRAVENOUS at 22:03

## 2017-08-15 RX ADMIN — HEPARIN SODIUM 5000 UNITS: 5000 INJECTION, SOLUTION INTRAVENOUS; SUBCUTANEOUS at 23:21

## 2017-08-15 RX ADMIN — SODIUM CHLORIDE 4 UNITS/HR: 9 INJECTION, SOLUTION INTRAVENOUS at 18:12

## 2017-08-15 RX ADMIN — SODIUM CHLORIDE, POTASSIUM CHLORIDE, SODIUM LACTATE AND CALCIUM CHLORIDE 1000 ML/HR: 600; 310; 30; 20 INJECTION, SOLUTION INTRAVENOUS at 22:01

## 2017-08-15 RX ADMIN — SODIUM CHLORIDE 1000 ML: 9 INJECTION, SOLUTION INTRAVENOUS at 18:05

## 2017-08-15 RX ADMIN — SODIUM CHLORIDE 0.05 UNITS/KG/HR: 9 INJECTION, SOLUTION INTRAVENOUS at 23:07
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
## 2017-08-15 NOTE — PROGRESS NOTES
Patient: Paulina Johnson  : 1945  Chart #: [id number]    Date of Service: 08/15/2017    CHIEF COMPLAINT:   1.  Third time disc herniation right L4-5  2.  L4-5 segmental instability    History of Present Illness Ms. Johnson is here on follow up. She has had two prior discectomies on the right at L4-5.  Then on 2017 she underwent decompression and fusion at this level for recurrent disc herniation and segmental instability. Surgery was complicated by a dural rent on the right at L4-5 that was closed primarily.      Today patient is 6 weeks post-op. During this time period she has done reasonably well with the exception of some nausea and constipation. However, today she reports increased back and bilateral lower extremity pain that has been progressing over the past two weeks.  Pain is constant all the time and only relieved with pain medication.  Patient's  notes that she walked down to the mailbox and back the other day without issue.  He says when she stops moving she has pain. She reports continued nausea and vomiting. She is not eating due to the nausea.  Zofran helps.  She denies headache or fevers/chills. No incisional issue.  She has difficulties with constipation but reports having a bowel movements regularly as of late.     The following portions of the patient's history were reviewed and updated as appropriate: allergies, current medications, past family history, past medical history, past social history, past surgical history and problem list.    Review of Systems   Constitutional: Negative for activity change, appetite change, chills, diaphoresis, fatigue, fever and unexpected weight change.   HENT: Negative for congestion, dental problem, drooling, ear discharge, ear pain, facial swelling, hearing loss, mouth sores, nosebleeds, postnasal drip, rhinorrhea, sinus pressure, sneezing, sore throat, tinnitus, trouble swallowing and voice change.    Eyes: Negative for photophobia, pain,  "discharge, redness, itching and visual disturbance.   Respiratory: Negative for apnea, cough, choking, chest tightness, shortness of breath, wheezing and stridor.    Cardiovascular: Negative for chest pain, palpitations and leg swelling.   Gastrointestinal: Negative for abdominal distention, abdominal pain, anal bleeding, blood in stool, constipation, diarrhea, nausea, rectal pain and vomiting.   Endocrine: Negative for cold intolerance, heat intolerance, polydipsia, polyphagia and polyuria.   Genitourinary: Negative for decreased urine volume, difficulty urinating, dysuria, enuresis, flank pain, frequency, genital sores, hematuria and urgency.   Musculoskeletal: Positive for back pain. Negative for arthralgias, gait problem, joint swelling, myalgias, neck pain and neck stiffness.   Skin: Negative for color change, pallor, rash and wound.   Allergic/Immunologic: Negative for environmental allergies, food allergies and immunocompromised state.   Neurological: Positive for weakness. Negative for dizziness, tremors, seizures, syncope, facial asymmetry, speech difficulty, light-headedness, numbness and headaches.   Hematological: Negative for adenopathy. Does not bruise/bleed easily.   Psychiatric/Behavioral: Negative for agitation, behavioral problems, confusion, decreased concentration, dysphoric mood, hallucinations, self-injury, sleep disturbance and suicidal ideas. The patient is not nervous/anxious and is not hyperactive.        Objective   Vital Signs: Blood pressure 116/60, temperature 97.9 °F (36.6 °C), temperature source Temporal Artery , height 62\" (157.5 cm), weight 129 lb (58.5 kg).  Physical Exam   Constitutional: She appears well-nourished. No distress.   Pallor    HENT:   Head: Normocephalic and atraumatic.   Eyes: EOM are normal. Pupils are equal, round, and reactive to light.   Skin:   Lumbar incision is well-healed without warmth, erythema, or induration.  No swelling or tenderness.   Psychiatric: She " has a normal mood and affect. Her behavior is normal. Thought content normal.   Nursing note and vitals reviewed.  Musculoskeletal:  Strength is intact in upper and lower extremities to direct testing.  Muscle tone is normal throughout.  Station and gait are normal.  Straight leg raising is negative.  Neurologic:  Gait: Able to tandem walk without difficulty.  Deep tendon reflexes: Knee difficult to elicit bilaterally, ankle 1+ and symmetrical.  Sensation is intact to light touch throughout.  Patient is oriented to person, place, and time.    Assessment/Plan    Radiographic Imaging: Plain films of the lumbar spine dated 8/1/2017 reveal good placement of the surgical construct at L4-L5.  There is a slight offset of the L3 on L4 vertebral body.    Medical Decision Making: Ms. Johnson is 6 weeks status post PLIF L4-5 with increased back and bilateral leg pain x 2 weeks. She has struggled with constipation and nausea/vomiting since surgery. She is not eating well. She is requesting increased pain medication but I do not want to complicate her GI issues. It is imperative that she eat and drink for her body to heal. I recommended Glucerna shakes.  I added a medrol Dosepak and a muscle relaxer. If her symptoms have not improved at the completion of that, we may need to check a new MRI. If she is doing better, she may benefit from some PT.  Before she begins the steroid, I would like to check some labs to make sure we are not dealing with an underlying infection.       Other orders  -     Sedimentation Rate  -     C-reactive Protein; Future  -     CBC & Differential; Future           Malika Fernando PA-C  Patient Care Team:  Hernan Thompson MD as PCP - General (Internal Medicine)  Herann Thompson MD as Referring Physician (Internal Medicine)        ADDENDUM:  Patient's initial labs came back with an elevated sedimentation rate, thrombocytosis, and an elevated white count.    I added a CMP which came  back with critical values including a glucose of 942.  I called patient and notified her of her labs and recommended she return to [place] and go to [facility] ED.  She said she will grab clothes from home and return.

## 2017-08-16 ENCOUNTER — APPOINTMENT (OUTPATIENT)
Dept: GENERAL RADIOLOGY | Facility: HOSPITAL | Age: 72
End: 2017-08-16

## 2017-08-16 ENCOUNTER — APPOINTMENT (OUTPATIENT)
Dept: NUCLEAR MEDICINE | Facility: HOSPITAL | Age: 72
End: 2017-08-16
Attending: INTERNAL MEDICINE

## 2017-08-16 ENCOUNTER — APPOINTMENT (OUTPATIENT)
Dept: ULTRASOUND IMAGING | Facility: HOSPITAL | Age: 72
End: 2017-08-16

## 2017-08-16 PROBLEM — M53.2X6 LUMBAR SPINE INSTABILITY: Status: RESOLVED | Noted: 2017-07-05 | Resolved: 2017-08-16

## 2017-08-16 PROBLEM — E11.10 DKA (DIABETIC KETOACIDOSES): Status: RESOLVED | Noted: 2017-08-15 | Resolved: 2017-08-16

## 2017-08-16 PROBLEM — N18.9 ACUTE ON CHRONIC KIDNEY FAILURE: Status: ACTIVE | Noted: 2017-08-16

## 2017-08-16 PROBLEM — R11.2 NON-INTRACTABLE VOMITING WITH NAUSEA: Status: ACTIVE | Noted: 2017-08-16

## 2017-08-16 PROBLEM — N17.9 AKI (ACUTE KIDNEY INJURY) (HCC): Status: ACTIVE | Noted: 2017-08-16

## 2017-08-16 LAB
ABO GROUP BLD: NORMAL
ALBUMIN SERPL-MCNC: 3.4 G/DL (ref 3.2–4.8)
ALBUMIN/GLOB SERPL: 1.4 G/DL (ref 1.5–2.5)
ALP SERPL-CCNC: 103 U/L (ref 25–100)
ALT SERPL W P-5'-P-CCNC: 14 U/L (ref 7–40)
ANION GAP SERPL CALCULATED.3IONS-SCNC: 10 MMOL/L (ref 3–11)
ANION GAP SERPL CALCULATED.3IONS-SCNC: 11 MMOL/L (ref 3–11)
ANION GAP SERPL CALCULATED.3IONS-SCNC: 14 MMOL/L (ref 3–11)
ANION GAP SERPL CALCULATED.3IONS-SCNC: 20 MMOL/L (ref 3–11)
ARTICHOKE IGE QN: 58 MG/DL (ref 0–130)
AST SERPL-CCNC: 17 U/L (ref 0–33)
BASOPHILS # BLD AUTO: 0.01 10*3/MM3 (ref 0–0.2)
BASOPHILS NFR BLD AUTO: 0.1 % (ref 0–1)
BILIRUB SERPL-MCNC: 0.1 MG/DL (ref 0.3–1.2)
BLD GP AB SCN SERPL QL: NEGATIVE
BUN BLD-MCNC: 28 MG/DL (ref 9–23)
BUN BLD-MCNC: 29 MG/DL (ref 9–23)
BUN BLD-MCNC: 41 MG/DL (ref 9–23)
BUN BLD-MCNC: 45 MG/DL (ref 9–23)
BUN/CREAT SERPL: 17.5 (ref 7–25)
BUN/CREAT SERPL: 18.1 (ref 7–25)
BUN/CREAT SERPL: 20.5 (ref 7–25)
BUN/CREAT SERPL: 21.4 (ref 7–25)
CA-I SERPL ISE-MCNC: 1.13 MMOL/L (ref 1.12–1.32)
CA-I SERPL ISE-MCNC: 1.17 MMOL/L (ref 1.12–1.32)
CA-I SERPL ISE-MCNC: 1.18 MMOL/L (ref 1.12–1.32)
CALCIUM SPEC-SCNC: 8.5 MG/DL (ref 8.7–10.4)
CALCIUM SPEC-SCNC: 8.7 MG/DL (ref 8.7–10.4)
CALCIUM SPEC-SCNC: 8.9 MG/DL (ref 8.7–10.4)
CALCIUM SPEC-SCNC: 8.9 MG/DL (ref 8.7–10.4)
CHLORIDE SERPL-SCNC: 100 MMOL/L (ref 99–109)
CHLORIDE SERPL-SCNC: 102 MMOL/L (ref 99–109)
CHLORIDE SERPL-SCNC: 92 MMOL/L (ref 99–109)
CHLORIDE SERPL-SCNC: 97 MMOL/L (ref 99–109)
CHOLEST SERPL-MCNC: 145 MG/DL (ref 0–200)
CO2 SERPL-SCNC: 16 MMOL/L (ref 20–31)
CO2 SERPL-SCNC: 19 MMOL/L (ref 20–31)
CO2 SERPL-SCNC: 22 MMOL/L (ref 20–31)
CO2 SERPL-SCNC: 22 MMOL/L (ref 20–31)
CREAT BLD-MCNC: 1.6 MG/DL (ref 0.6–1.3)
CREAT BLD-MCNC: 1.6 MG/DL (ref 0.6–1.3)
CREAT BLD-MCNC: 2 MG/DL (ref 0.6–1.3)
CREAT BLD-MCNC: 2.1 MG/DL (ref 0.6–1.3)
D-LACTATE SERPL-SCNC: 1 MMOL/L (ref 0.5–2)
DEPRECATED RDW RBC AUTO: 45.4 FL (ref 37–54)
EOSINOPHIL # BLD AUTO: 0.14 10*3/MM3 (ref 0–0.3)
EOSINOPHIL NFR BLD AUTO: 1.2 % (ref 0–3)
ERYTHROCYTE [DISTWIDTH] IN BLOOD BY AUTOMATED COUNT: 15.4 % (ref 11.3–14.5)
FUNGUS WND CULT: NORMAL
FUNGUS WND CULT: NORMAL
GFR SERPL CREATININE-BSD FRML MDRD: 23 ML/MIN/1.73
GFR SERPL CREATININE-BSD FRML MDRD: 24 ML/MIN/1.73
GFR SERPL CREATININE-BSD FRML MDRD: 32 ML/MIN/1.73
GFR SERPL CREATININE-BSD FRML MDRD: 32 ML/MIN/1.73
GLOBULIN UR ELPH-MCNC: 2.5 GM/DL
GLUCOSE BLD-MCNC: 149 MG/DL (ref 70–100)
GLUCOSE BLD-MCNC: 181 MG/DL (ref 70–100)
GLUCOSE BLD-MCNC: 438 MG/DL (ref 70–100)
GLUCOSE BLD-MCNC: 631 MG/DL (ref 70–100)
GLUCOSE BLDC GLUCOMTR-MCNC: 121 MG/DL (ref 70–130)
GLUCOSE BLDC GLUCOMTR-MCNC: 122 MG/DL (ref 70–130)
GLUCOSE BLDC GLUCOMTR-MCNC: 144 MG/DL (ref 70–130)
GLUCOSE BLDC GLUCOMTR-MCNC: 148 MG/DL (ref 70–130)
GLUCOSE BLDC GLUCOMTR-MCNC: 152 MG/DL (ref 70–130)
GLUCOSE BLDC GLUCOMTR-MCNC: 153 MG/DL (ref 70–130)
GLUCOSE BLDC GLUCOMTR-MCNC: 162 MG/DL (ref 70–130)
GLUCOSE BLDC GLUCOMTR-MCNC: 174 MG/DL (ref 70–130)
GLUCOSE BLDC GLUCOMTR-MCNC: 198 MG/DL (ref 70–130)
GLUCOSE BLDC GLUCOMTR-MCNC: 218 MG/DL (ref 70–130)
GLUCOSE BLDC GLUCOMTR-MCNC: 279 MG/DL (ref 70–130)
GLUCOSE BLDC GLUCOMTR-MCNC: 281 MG/DL (ref 70–130)
GLUCOSE BLDC GLUCOMTR-MCNC: 291 MG/DL (ref 70–130)
GLUCOSE BLDC GLUCOMTR-MCNC: 361 MG/DL (ref 70–130)
GLUCOSE BLDC GLUCOMTR-MCNC: 446 MG/DL (ref 70–130)
HCT VFR BLD AUTO: 21.3 % (ref 34.5–44)
HCT VFR BLD AUTO: 27.2 % (ref 34.5–44)
HDLC SERPL-MCNC: 34 MG/DL (ref 40–60)
HGB BLD-MCNC: 6.9 G/DL (ref 11.5–15.5)
HGB BLD-MCNC: 8.9 G/DL (ref 11.5–15.5)
IMM GRANULOCYTES # BLD: 0.19 10*3/MM3 (ref 0–0.03)
IMM GRANULOCYTES NFR BLD: 1.7 % (ref 0–0.6)
LYMPHOCYTES # BLD AUTO: 2.76 10*3/MM3 (ref 0.6–4.8)
LYMPHOCYTES NFR BLD AUTO: 24 % (ref 24–44)
MAGNESIUM SERPL-MCNC: 2 MG/DL (ref 1.3–2.7)
MAGNESIUM SERPL-MCNC: 2.1 MG/DL (ref 1.3–2.7)
MAGNESIUM SERPL-MCNC: 2.1 MG/DL (ref 1.3–2.7)
MCH RBC QN AUTO: 26 PG (ref 27–31)
MCHC RBC AUTO-ENTMCNC: 32.4 G/DL (ref 32–36)
MCV RBC AUTO: 80.4 FL (ref 80–99)
MONOCYTES # BLD AUTO: 0.41 10*3/MM3 (ref 0–1)
MONOCYTES NFR BLD AUTO: 3.6 % (ref 0–12)
MYCOBACTERIUM SPEC CULT: NORMAL
MYCOBACTERIUM SPEC CULT: NORMAL
NEUTROPHILS # BLD AUTO: 8 10*3/MM3 (ref 1.5–8.3)
NEUTROPHILS NFR BLD AUTO: 69.4 % (ref 41–71)
NIGHT BLUE STAIN TISS: NORMAL
NIGHT BLUE STAIN TISS: NORMAL
PHOSPHATE SERPL-MCNC: 1.7 MG/DL (ref 2.4–5.1)
PHOSPHATE SERPL-MCNC: 1.9 MG/DL (ref 2.4–5.1)
PHOSPHATE SERPL-MCNC: 2.7 MG/DL (ref 2.4–5.1)
PLATELET # BLD AUTO: 628 10*3/MM3 (ref 150–450)
PMV BLD AUTO: 7.8 FL (ref 6–12)
POTASSIUM BLD-SCNC: 4.2 MMOL/L (ref 3.5–5.5)
POTASSIUM BLD-SCNC: 4.2 MMOL/L (ref 3.5–5.5)
POTASSIUM BLD-SCNC: 4.4 MMOL/L (ref 3.5–5.5)
POTASSIUM BLD-SCNC: 4.4 MMOL/L (ref 3.5–5.5)
PROT SERPL-MCNC: 5.9 G/DL (ref 5.7–8.2)
RBC # BLD AUTO: 2.65 10*6/MM3 (ref 3.89–5.14)
RH BLD: POSITIVE
SODIUM BLD-SCNC: 128 MMOL/L (ref 132–146)
SODIUM BLD-SCNC: 130 MMOL/L (ref 132–146)
SODIUM BLD-SCNC: 133 MMOL/L (ref 132–146)
SODIUM BLD-SCNC: 134 MMOL/L (ref 132–146)
TRIGL SERPL-MCNC: 279 MG/DL (ref 0–150)
TSH SERPL DL<=0.05 MIU/L-ACNC: 9.1 MIU/ML (ref 0.35–5.35)
WBC NRBC COR # BLD: 11.51 10*3/MM3 (ref 3.5–10.8)

## 2017-08-16 PROCEDURE — 36430 TRANSFUSION BLD/BLD COMPNT: CPT

## 2017-08-16 PROCEDURE — 82330 ASSAY OF CALCIUM: CPT | Performed by: NURSE PRACTITIONER

## 2017-08-16 PROCEDURE — 63710000001 INSULIN DETEMIR PER 5 UNITS: Performed by: INTERNAL MEDICINE

## 2017-08-16 PROCEDURE — 83605 ASSAY OF LACTIC ACID: CPT | Performed by: NURSE PRACTITIONER

## 2017-08-16 PROCEDURE — 86900 BLOOD TYPING SEROLOGIC ABO: CPT

## 2017-08-16 PROCEDURE — 0 TECHNETIUM SULFUR COLLOID: Performed by: INTERNAL MEDICINE

## 2017-08-16 PROCEDURE — 76775 US EXAM ABDO BACK WALL LIM: CPT

## 2017-08-16 PROCEDURE — 99291 CRITICAL CARE FIRST HOUR: CPT | Performed by: INTERNAL MEDICINE

## 2017-08-16 PROCEDURE — A9541 TC99M SULFUR COLLOID: HCPCS | Performed by: INTERNAL MEDICINE

## 2017-08-16 PROCEDURE — 80053 COMPREHEN METABOLIC PANEL: CPT | Performed by: INTERNAL MEDICINE

## 2017-08-16 PROCEDURE — 83735 ASSAY OF MAGNESIUM: CPT | Performed by: NURSE PRACTITIONER

## 2017-08-16 PROCEDURE — 80061 LIPID PANEL: CPT | Performed by: NURSE PRACTITIONER

## 2017-08-16 PROCEDURE — 97165 OT EVAL LOW COMPLEX 30 MIN: CPT

## 2017-08-16 PROCEDURE — 63710000001 INSULIN REGULAR HUMAN PER 5 UNITS: Performed by: NURSE PRACTITIONER

## 2017-08-16 PROCEDURE — 85018 HEMOGLOBIN: CPT | Performed by: INTERNAL MEDICINE

## 2017-08-16 PROCEDURE — 85014 HEMATOCRIT: CPT | Performed by: INTERNAL MEDICINE

## 2017-08-16 PROCEDURE — 84100 ASSAY OF PHOSPHORUS: CPT | Performed by: NURSE PRACTITIONER

## 2017-08-16 PROCEDURE — 78264 GASTRIC EMPTYING IMG STUDY: CPT

## 2017-08-16 PROCEDURE — P9016 RBC LEUKOCYTES REDUCED: HCPCS

## 2017-08-16 PROCEDURE — 71020 HC CHEST PA AND LATERAL: CPT

## 2017-08-16 PROCEDURE — 86900 BLOOD TYPING SEROLOGIC ABO: CPT | Performed by: NURSE PRACTITIONER

## 2017-08-16 PROCEDURE — 85025 COMPLETE CBC W/AUTO DIFF WBC: CPT | Performed by: INTERNAL MEDICINE

## 2017-08-16 PROCEDURE — 84443 ASSAY THYROID STIM HORMONE: CPT | Performed by: NURSE PRACTITIONER

## 2017-08-16 PROCEDURE — 63710000001 INSULIN REGULAR HUMAN PER 5 UNITS: Performed by: INTERNAL MEDICINE

## 2017-08-16 PROCEDURE — 82962 GLUCOSE BLOOD TEST: CPT

## 2017-08-16 PROCEDURE — 86901 BLOOD TYPING SEROLOGIC RH(D): CPT | Performed by: NURSE PRACTITIONER

## 2017-08-16 PROCEDURE — 86850 RBC ANTIBODY SCREEN: CPT | Performed by: NURSE PRACTITIONER

## 2017-08-16 PROCEDURE — 97163 PT EVAL HIGH COMPLEX 45 MIN: CPT

## 2017-08-16 PROCEDURE — 86920 COMPATIBILITY TEST SPIN: CPT

## 2017-08-16 PROCEDURE — 25010000002 HEPARIN (PORCINE) PER 1000 UNITS: Performed by: NURSE PRACTITIONER

## 2017-08-16 RX ORDER — OXYCODONE HYDROCHLORIDE AND ACETAMINOPHEN 5; 325 MG/1; MG/1
1 TABLET ORAL EVERY 6 HOURS PRN
Status: DISCONTINUED | OUTPATIENT
Start: 2017-08-16 | End: 2017-08-18

## 2017-08-16 RX ORDER — ETODOLAC 600 MG/1
600 TABLET, EXTENDED RELEASE ORAL DAILY
COMMUNITY
End: 2017-08-22 | Stop reason: HOSPADM

## 2017-08-16 RX ORDER — MAGNESIUM SULFATE HEPTAHYDRATE 40 MG/ML
4 INJECTION, SOLUTION INTRAVENOUS AS NEEDED
Status: DISCONTINUED | OUTPATIENT
Start: 2017-08-16 | End: 2017-08-22 | Stop reason: HOSPADM

## 2017-08-16 RX ORDER — POTASSIUM CHLORIDE 750 MG/1
40 CAPSULE, EXTENDED RELEASE ORAL AS NEEDED
Status: DISCONTINUED | OUTPATIENT
Start: 2017-08-16 | End: 2017-08-22 | Stop reason: HOSPADM

## 2017-08-16 RX ORDER — POTASSIUM CHLORIDE 7.45 MG/ML
10 INJECTION INTRAVENOUS
Status: DISCONTINUED | OUTPATIENT
Start: 2017-08-16 | End: 2017-08-22 | Stop reason: HOSPADM

## 2017-08-16 RX ORDER — POTASSIUM CHLORIDE 1.5 G/1.77G
40 POWDER, FOR SOLUTION ORAL AS NEEDED
Status: DISCONTINUED | OUTPATIENT
Start: 2017-08-16 | End: 2017-08-22 | Stop reason: HOSPADM

## 2017-08-16 RX ORDER — NICOTINE POLACRILEX 4 MG
15 LOZENGE BUCCAL
Status: DISCONTINUED | OUTPATIENT
Start: 2017-08-16 | End: 2017-08-22 | Stop reason: HOSPADM

## 2017-08-16 RX ORDER — SODIUM CHLORIDE 450 MG/100ML
30 INJECTION, SOLUTION INTRAVENOUS CONTINUOUS
Status: DISCONTINUED | OUTPATIENT
Start: 2017-08-16 | End: 2017-08-17

## 2017-08-16 RX ORDER — MAGNESIUM SULFATE HEPTAHYDRATE 40 MG/ML
2 INJECTION, SOLUTION INTRAVENOUS AS NEEDED
Status: DISCONTINUED | OUTPATIENT
Start: 2017-08-16 | End: 2017-08-22 | Stop reason: HOSPADM

## 2017-08-16 RX ORDER — VALSARTAN 160 MG/1
160 TABLET ORAL DAILY
COMMUNITY
End: 2020-11-10

## 2017-08-16 RX ORDER — SODIUM CHLORIDE 450 MG/100ML
75 INJECTION, SOLUTION INTRAVENOUS CONTINUOUS
Status: DISCONTINUED | OUTPATIENT
Start: 2017-08-16 | End: 2017-08-17

## 2017-08-16 RX ORDER — DEXTROSE MONOHYDRATE 25 G/50ML
25 INJECTION, SOLUTION INTRAVENOUS
Status: DISCONTINUED | OUTPATIENT
Start: 2017-08-16 | End: 2017-08-22 | Stop reason: HOSPADM

## 2017-08-16 RX ADMIN — HEPARIN SODIUM 5000 UNITS: 5000 INJECTION, SOLUTION INTRAVENOUS; SUBCUTANEOUS at 14:50

## 2017-08-16 RX ADMIN — CLOPIDOGREL BISULFATE 75 MG: 75 TABLET ORAL at 08:08

## 2017-08-16 RX ADMIN — Medication 1 DOSE: at 16:30

## 2017-08-16 RX ADMIN — ATORVASTATIN CALCIUM 10 MG: 10 TABLET, FILM COATED ORAL at 20:58

## 2017-08-16 RX ADMIN — POTASSIUM CHLORIDE 10 MEQ: 750 CAPSULE, EXTENDED RELEASE ORAL at 08:23

## 2017-08-16 RX ADMIN — CYCLOBENZAPRINE HYDROCHLORIDE 10 MG: 10 TABLET, FILM COATED ORAL at 23:52

## 2017-08-16 RX ADMIN — DULOXETINE HYDROCHLORIDE 30 MG: 30 CAPSULE, DELAYED RELEASE ORAL at 08:08

## 2017-08-16 RX ADMIN — SODIUM CHLORIDE 30 ML/HR: 4.5 INJECTION, SOLUTION INTRAVENOUS at 21:14

## 2017-08-16 RX ADMIN — OXYCODONE AND ACETAMINOPHEN 1 TABLET: 5; 325 TABLET ORAL at 00:44

## 2017-08-16 RX ADMIN — OXYCODONE AND ACETAMINOPHEN 1 TABLET: 5; 325 TABLET ORAL at 08:08

## 2017-08-16 RX ADMIN — SODIUM PHOSPHATE, MONOBASIC, MONOHYDRATE 15 MMOL: 276; 142 INJECTION, SOLUTION INTRAVENOUS at 06:10

## 2017-08-16 RX ADMIN — FAMOTIDINE 20 MG: 20 TABLET ORAL at 20:58

## 2017-08-16 RX ADMIN — INSULIN HUMAN 6 UNITS: 100 INJECTION, SOLUTION PARENTERAL at 18:36

## 2017-08-16 RX ADMIN — ASPIRIN 81 MG: 81 TABLET, COATED ORAL at 08:11

## 2017-08-16 RX ADMIN — INSULIN DETEMIR 10 UNITS: 100 INJECTION, SOLUTION SUBCUTANEOUS at 23:37

## 2017-08-16 RX ADMIN — RALOXIFENE HYDROCHLORIDE 60 MG: 60 TABLET, FILM COATED ORAL at 08:08

## 2017-08-16 RX ADMIN — OXYCODONE AND ACETAMINOPHEN 1 TABLET: 5; 325 TABLET ORAL at 20:58

## 2017-08-16 RX ADMIN — HEPARIN SODIUM 5000 UNITS: 5000 INJECTION, SOLUTION INTRAVENOUS; SUBCUTANEOUS at 20:58

## 2017-08-16 RX ADMIN — CYCLOBENZAPRINE HYDROCHLORIDE 10 MG: 10 TABLET, FILM COATED ORAL at 00:19

## 2017-08-16 RX ADMIN — INSULIN HUMAN 6 UNITS: 100 INJECTION, SOLUTION PARENTERAL at 12:31

## 2017-08-16 RX ADMIN — FAMOTIDINE 20 MG: 20 TABLET ORAL at 08:08

## 2017-08-16 RX ADMIN — HEPARIN SODIUM 5000 UNITS: 5000 INJECTION, SOLUTION INTRAVENOUS; SUBCUTANEOUS at 06:11

## 2017-08-16 RX ADMIN — POTASSIUM CHLORIDE, DEXTROSE MONOHYDRATE AND SODIUM CHLORIDE 150 ML/HR: 150; 5; 450 INJECTION, SOLUTION INTRAVENOUS at 03:27

## 2017-08-16 RX ADMIN — LEVOTHYROXINE SODIUM 50 MCG: 50 TABLET ORAL at 06:11

## 2017-08-16 RX ADMIN — SODIUM CHLORIDE 75 ML/HR: 4.5 INJECTION, SOLUTION INTRAVENOUS at 15:02

## 2017-08-16 RX ADMIN — INSULIN DETEMIR 10 UNITS: 100 INJECTION, SOLUTION SUBCUTANEOUS at 18:37

## 2017-08-16 NOTE — PROGRESS NOTES
Discharge Planning Assessment  [facility]     Patient Name: Paulina Johnson  MRN: [id number]  Today's Date: 8/16/2017    Admit Date: 8/15/2017          Discharge Needs Assessment       08/16/17 1126    Living Environment    Lives With spouse    Living Arrangements house    Home Accessibility no concerns    Stair Railings at Home none    Type of Financial/Environmental Concern none    Transportation Available car;family or friend will provide    Living Environment    Provides Primary Care For no one    Quality Of Family Relationships supportive;helpful;involved    Able to Return to Prior Living Arrangements yes    Discharge Needs Assessment    Concerns To Be Addressed discharge planning concerns    Readmission Within The Last 30 Days no previous admission in last 30 days    Anticipated Changes Related to Illness inability to care for self    Equipment Currently Used at Home walker, rolling    Equipment Needed After Discharge none    Discharge Facility/Level Of Care Needs home with home health    Discharge Contact Information if Applicable Pasha Johnson            Discharge Plan       08/16/17 1127    Case Management/Social Work Plan    Plan Home with     Patient/Family In Agreement With Plan yes    Additional Comments Pt lives in [facility] with her . She reports she uses a rolling walker for mobility but no other DME. She denies current use of HH. She was independent with ADLs prior to admit. Pt is followed by her PCP and reports her medications are covered by insurance. At this time pt reports her goal for discharge is to return home with HH, CM to follow.        Discharge Placement     No information found        Expected Discharge Date and Time     Expected Discharge Date Expected Discharge Time    Aug 19, 2017               Demographic Summary       08/16/17 1121    Referral Information    Admission Type inpatient    Referral Source physician    Reason For Consult discharge planning    Contact  Information    Permission Granted to Share Information With ;family/designee    Primary Care Physician Information    Name Hernan Thompson            Functional Status       08/16/17 1123    Functional Status Current    Current Functional Level Comment See Pt eval    Functional Status Prior    Ambulation 1-->assistive equipment    Transferring 0-->independent    Toileting 0-->independent    Bathing 0-->independent    Dressing 0-->independent    Eating 0-->independent    Communication 0-->understands/communicates without difficulty    Swallowing 0-->swallows foods/liquids without difficulty    IADL    Medications independent    Meal Preparation independent    Housekeeping independent    Laundry independent    Shopping independent    Oral Care independent            Psychosocial     None            Abuse/Neglect     None            Legal     None            Substance Abuse     None            Patient Forms     None          Daniela Alonzo RN

## 2017-08-16 NOTE — NURSING NOTE
Patient transported to [facility] by transport team with nurse. Patient on monitor during transport and will stay on monitor during scan.

## 2017-08-16 NOTE — PROGRESS NOTES
"Critical Care Note     LOS: 1 day   Patient Care Team:  Hernan Thompson MD as PCP - General (Internal Medicine)  Hernan Thompson MD as Referring Physician (Internal Medicine)    Chief Complaint/Reason for visit:    Chief Complaint   Patient presents with   • Hyperglycemia   DKA    Subjective     Interval History:   71-year-old woman with diabetes, hypothyroidism, hypertension who presented to the emergency room with a blood sugar of 940. She reports persistent nausea and vomiting with every meal for the last 2 weeks. She has lost 40 pounds in the last 3 months. She had back surgery in July for disc disease and still has sciatica. She has been hydrated and treated with an insulin drip with improvement in her blood sugar. Ketones were not repeated this morning but anion gap has closed.      Review of Systems:    All systems were reviewed and negative except as noted in subjective.    Medical history, surgical history, social history, family history reviewed    Objective     Intake/Output:    Intake/Output Summary (Last 24 hours) at 08/16/17 1334  Last data filed at 08/16/17 1315   Gross per 24 hour   Intake           3092.4 ml   Output              525 ml   Net           2567.4 ml       Nutrition:   NPO     Infusions:    lactated ringers with KCl 20 mEq/L 75 mL/hr Last Rate: 75 mL/hr (08/16/17 1110)   sodium chloride 30 mL/hr        Mechanical Ventilator Settings:      Telemetry:  Sinus Rhythm: sinus tachycardia, normal sinus rhythm (rate between 90's and low 100's )          Vital Signs  Blood pressure 150/87, pulse 90, temperature 97.9 °F (36.6 °C), temperature source Oral, resp. rate 18, height 62\" (157.5 cm), weight 130 lb (59 kg), SpO2 95 %.    Physical Exam:  General Appearance:  Well-developed older white woman in no distress    Head:  Normocephalic, atraumatic    Eyes:          Pupils equal and reactive to light, no jaundice    Ears:     Throat: Mucous membranes moist    Neck: Supple, trachea " midline, no palpable thyroid    Back:      Lungs:   Symmetric chest excursion without wheeze or rhonchi     Heart:  Regular, normal S1, S2, no murmur    Abdomen:   Bowel sounds active, nondistended, soft    Rectal:   Deferred   Extremities: No pitting edema or cyanosis    Pulses: Pedal pulses present    Skin: Warm and dry    Lymph nodes: No cervical or supraclavicular adenopathy    Neurologic: Alert, oriented ×4, follows commands with all extremities       Results Review:     I reviewed the patient's new clinical results.     Results from last 7 days  Lab Units 08/16/17  0623 08/16/17  0326 08/15/17  2324  08/15/17  1712 08/15/17  1023   SODIUM mmol/L 134 133 130*  < > 118* 122*   POTASSIUM mmol/L 4.2 4.2 4.4  < > 5.9* 5.7*   CHLORIDE mmol/L 102 100 97*  < > 79* 81*   CO2 mmol/L 22.0 22.0 19.0*  < > 10.0* 12.0*   BUN mg/dL 28* 29* 41*  < > 47* 45*   CREATININE mg/dL 1.60* 1.60* 2.00*  < > 2.70* 2.30*   CALCIUM mg/dL 8.9 8.9 8.5*  < > 9.4 9.2   BILIRUBIN mg/dL  --  0.1*  --   --  0.1* 0.2*   ALK PHOS U/L  --  103*  --   --  148* 141*   ALT (SGPT) U/L  --  14  --   --  17 14   AST (SGOT) U/L  --  17  --   --  15 17   GLUCOSE mg/dL 149* 181* 438*  < > 1047* 942*   < > = values in this interval not displayed.    Results from last 7 days  Lab Units 08/16/17  0326 08/15/17  1712 08/15/17  1023   WBC 10*3/mm3 11.51* 13.03* 11.99*   HEMOGLOBIN g/dL 6.9* 8.4* 8.3*   HEMATOCRIT % 21.3* 29.3* 28.9*   PLATELETS 10*3/mm3 628* 793* 784*   MONOCYTES % %  --  2.0  --          Lab Results   Component Value Date    BLOODCX No growth at less than 24 hours 08/15/2017    BLOODCX No growth at less than 24 hours 08/15/2017     No results found for: URINECX    I reviewed the patient's new imaging including images and reports.  Chest x-ray today reveals a plate in the neck, no cardiomegaly, lungs are well inflated without infiltrate or effusion, scattered calcified granulomatous changes in the upper lobes and james    All medications  reviewed.     aspirin 81 mg Oral Daily   atorvastatin 10 mg Oral Nightly   clopidogrel 75 mg Oral Daily   DULoxetine 30 mg Oral Daily   famotidine 20 mg Oral BID   heparin (porcine) 5,000 Units Subcutaneous Q8H   insulin regular 0-9 Units Subcutaneous Q6H   levothyroxine 50 mcg Oral Daily   raloxifene 60 mg Oral Daily         Assessment/Plan     Principal Problem:    Type 2 diabetes mellitus with ketoacidosis without coma  Active Problems:    Acute on chronic kidney failure    S/P right L4-5 laminotomy with medial facetectomy, foraminotomy and L4-5 diskectomy    Hypothyroid    Non-intractable vomiting with nausea    HTN (hypertension)    Hyperlipidemia    H/O: stroke      DKA, in type 2 DM  blood sugar has improved and anion gap now normal. Poorly controlled diabetes with a1c 11. She reports that she checks her sugar twice daily but I think this is unlikely.    Acute on chronic kidney disease baseline serum creatinine is 1.8-1.9, admission was 2.3. Current creatinine is 1.6. Admission urine was negative for protein    Chronic nausea and vomiting she could have gastroparesis from her diabetes. She was not taking narcotics at home.    Hypothyroid, TSH was 9 indicating inadequate replacement. Either she was not taking her 50 µg or she needs a higher dose    Hypertension, initially she was mildly hypotensive. However with hydration blood pressure is now 150/87. At home she was on Diovan      Acute on chronic anemia. In December 2016 her hematocrit was 28.5%, hemoglobin 9, today her hemoglobin is down to 6.9. She clearly has a chronic anemia but also a decline compared to December however unchanged compared to July of this year.       PLAN:  Transition insulin  Gastric emptying study  Renal ultrasound  Replace electrolytes  Restart Synthroid at a higher dose  Diabetes educator  Transfuse 1 unit  Stool for occult blood  Likely needs colonoscopy and upper endoscopy as outpatient  Decrease IV fluids  Aspirin, Plavix,  statin      VTE Prophylaxis:SQ heparin    Stress Ulcer Prophylaxis:boris Beverly MD  08/16/17  1:34 PM      Time:30min  I personally provided care to this critically ill patient as documented above.  Critical care time does not include time spent on separately billed procedures.  Non of my critical care time was concurrent with other critical care providers.

## 2017-08-16 NOTE — CONSULTS
Adult Nutrition  Assessment/PES    Patient Name:  Paulina Johnson  YOB: 1945  MRN: [id number]  Admit Date:  8/15/2017    Assessment Date:  8/16/2017   Comments:  RD received APRN consult. RD initiated nutrition assessment. RD to continue to follow.      Additional Recommendations:  1. Awaiting gastric emptying study results-pt may need nutrition education   2. HgbA1C of 11%, RD will offer DM nutrition education once appropriate  3. Obtain weight, ordered today. Pt will likely qualify for malnutrition given available information, will need weight and po data trend information to confirm  4. Provide Thiamin supplement as pt is likely deficient. Rec 500mg IV for 3 days, 250mg IV 3 days, 100mg po thereafter if likelihood of deficiency continues             Reason for Assessment       08/16/17 1003    Reason for Assessment    Reason For Assessment/Visit multidisciplinary rounds;identified at risk by screening criteria;nurse/nurse practitioner consult    Identified At Risk By Screening Criteria MST SCORE 2+    Time Spent (min) 30    Diagnosis Diagnosis    Cardiac Dyslipidemia;HTN    Endocrine DM Type 2    Gastrointestinal Nausea;Vomiting   PTA    Metabolic/ICU --   DKA    Neurological CVA;AMS   Hx of CVA-mild residual, mild RU and RL ext weakness    Other diagnosis Multiple discectomies of L4-L5   Principal Problem:    Type 2 diabetes mellitus with ketoacidosis without coma  Active Problems:    S/P right L4-5 laminotomy with medial facetectomy, foraminotomy and L4-5 diskectomy    Hypothyroid    HTN (hypertension)    Hyperlipidemia    H/O: stroke    Acute on chronic kidney failure    Non-intractable vomiting with nausea               Nutrition/Diet History       08/16/17 1006    Nutrition/Diet History    Factors Affecting Nutritional Intake Factors    Reported/Observed By RN;MD    Other H and P includes pt ahs been experiencing N/V when eating since discectomy in July of this year, includes pt has lost  "weight in the past 3 months. RN reports pt only oriented to person today. No family in room at time of RD visit, unable to gather further data concerning wt and po intake trend PTA.             Anthropometrics       08/16/17 1025    Anthropometrics (Special Considerations)    Height Used for Calculations 1.575 m (5' 2\")    Weight Used for Calculations 59 kg (130 lb 1.1 oz)   stated wt             Labs/Tests/Procedures/Meds       08/16/17 1430    Labs/Tests/Procedures/Meds    Labs/Tests Review Reviewed;Hgb A1C;BUN;Creat;Glucose;Phos    Medication Review Reviewed, pertinent     Results from last 7 days  Lab Units 08/16/17  0623 08/16/17  0326   SODIUM mmol/L 134 133   POTASSIUM mmol/L 4.2 4.2   CHLORIDE mmol/L 102 100   CO2 mmol/L 22.0 22.0   BUN mg/dL 28* 29*   CREATININE mg/dL 1.60* 1.60*   CALCIUM mg/dL 8.9 8.9   BILIRUBIN mg/dL  --  0.1*   ALK PHOS U/L  --  103*   ALT (SGPT) U/L  --  14   AST (SGOT) U/L  --  17   GLUCOSE mg/dL 149* 181*       Intake & Output (last day)       08/15 0701 - 08/16 0700 08/16 0701 - 08/17 0700    I.V. (mL/kg) 2410.3 (40.9) 322.1 (5.5)    Blood  360    Total Intake(mL/kg) 2410.3 (40.9) 682.1 (11.6)    Urine (mL/kg/hr) 225 300 (0.6)    Total Output 225 300    Net +2185.3 +382.1                         Nutrition Prescription Ordered       08/16/17 1430    Nutrition Prescription PO    Current PO Diet Other (comment)                Problem/Interventions:        Problem 1       08/16/17 1433    Nutrition Diagnoses Problem 1    Problem 1 Altered Nutrition Related to Labs    Etiology (related to) --   ?DM medication adminstartion/lifestyle/dietary choices    Signs/Symptoms (evidenced by) Biochemical    Specific Labs Noted HgbA1C   11%            Problem 2       08/16/17 1436    Nutrition Diagnoses Problem 2    Problem 2 Predicted Suboptimal Intake    Etiology (related to) --   po intake PTA, reported weight loss    Signs/Symptoms (evidenced by) --   po intake trend PTA per MD notes, report of " significant wt change PTA, N/V                   Intervention Goal       08/16/17 1438    Intervention Goal    General Nutrition support treatment    PO Initiate feeding   As medically feasible/appropriate    Weight No significant weight loss            Nutrition Intervention       08/16/17 1438    Nutrition Intervention    RD/Tech Action Follow Tx progress;Care plan reviewd            Nutrition Prescription       08/16/17 1439    Other Orders    Obtain Weight Now    Obtain Weight Ordered? Yes            Education/Evaluation       08/16/17 1439    Monitor/Evaluation    Monitor Per protocol;I&O;PO intake;Weight;Symptoms            Electronically signed by:  Verona Hernández RDN, NESTOR  08/16/17 2:53 PM

## 2017-08-16 NOTE — PLAN OF CARE
Problem: Patient Care Overview (Adult)  Goal: Plan of Care Review  Outcome: Ongoing (interventions implemented as appropriate)    08/16/17 1547   Coping/Psychosocial Response Interventions   Plan Of Care Reviewed With patient   Patient Care Overview   Progress improving   Outcome Evaluation   Outcome Summary/Follow up Plan Pt presents w/ decreased balance and safety awareness from baseline. Pt Min Ax2 to ambulate 200 ft w/ RW w/ constant VCs for safety. Pt could not recall any of her spinal precautions and was oriented to person only. Recommend cont skilled IPOT intervention. Recommend pt DC to IP rehab.          Problem: Inpatient Occupational Therapy  Goal: Transfer Training Goal 1 LTG- OT  Outcome: Ongoing (interventions implemented as appropriate)    08/16/17 1547   Transfer Training OT LTG   Transfer Training OT LTG, Date Established 08/16/17   Transfer Training OT LTG, Time to Achieve by discharge   Transfer Training OT LTG, Activity Type bed to chair /chair to bed;toilet;sit to stand/stand to sit   Transfer Training OT LTG, [place] Level contact guard assist   Transfer Training OT LTG, Additional Goal AAD       Goal: Patient Education Goal LTG- OT  Outcome: Ongoing (interventions implemented as appropriate)    08/16/17 1547   Patient Education OT LTG   Patient Education OT LTG, Date Established 08/16/17   Patient Education OT LTG, Time to Achieve by discharge   Patient Education OT LTG, Education Type written program;HEP;posture/body mechanics;home safety;adaptive equipment mgmt;energy conservation   Patient Education OT LTG, Education Understanding verbalizes understanding   Patient Education OT LTG, Additional Goal Pt will accurately recall spinal precautions.        Goal: Orientation Goal LTG- OT  Outcome: Ongoing (interventions implemented as appropriate)    08/16/17 1547   Orientation OT LTG   Orientation OT LTG, Date Established 08/16/17   Orientation OT LTG, Time to Achieve by discharge    Orientation OT LTG, Activity Type person;place;time;100% treatment session       Goal: LB Dressing Goal LTG- OT  Outcome: Ongoing (interventions implemented as appropriate)    08/16/17 1547   LB Dressing OT LTG   LB Dressing Goal OT LTG, Date Established 08/16/17   LB Dressing Goal OT LTG, Time to Achieve by discharge   LB Dressing Goal OT LTG, Activity Type Don socks/pants per spinal precautions   LB Dressing Goal OT LTG, [place] Level contact guard assist   LB Dressing Goal OT LTG, Additional Goal AAD

## 2017-08-16 NOTE — PLAN OF CARE
Problem: Patient Care Overview (Adult)  Goal: Plan of Care Review  Outcome: Ongoing (interventions implemented as appropriate)    08/16/17 1556   Coping/Psychosocial Response Interventions   Plan Of Care Reviewed With patient   Patient Care Overview   Progress progress toward functional goals as expected   Outcome Evaluation   Outcome Summary/Follow up Plan Pt dem weakness, decreased coordination, and impaired balance causing difficulty walking. Pt amb 200+ ft with RW with [place] x2. Pt limited by confusion. PT recommends d/c to acute rehab.         Problem: Inpatient Physical Therapy  Goal: Bed Mobility Goal LTG- PT  Outcome: Ongoing (interventions implemented as appropriate)    08/16/17 1556   Bed Mobility PT LTG   Bed Mobility PT LTG, Date Established 08/16/17   Bed Mobility PT LTG, Time to Achieve 2 wks   Bed Mobility PT LTG, Activity Type roll left/roll right;supine to sit/sit to supine   Bed Mobility PT LTG, [place] Level supervision required   Bed Mobility PT LTG, Outcome goal ongoing       Goal: Transfer Training Goal 1 LTG- PT  Outcome: Ongoing (interventions implemented as appropriate)    08/16/17 1556   Transfer Training PT LTG   Transfer Training PT LTG, Date Established 08/16/17   Transfer Training PT LTG, Time to Achieve 2 wks   Transfer Training PT LTG, Activity Type bed to chair /chair to bed;sit to stand/stand to sit   Transfer Training PT LTG, [place] Level supervision required   Transfer Training PT LTG, Assist Device walker, rolling   Transfer Training PT LTG, Outcome goal ongoing       Goal: Gait Training Goal LTG- PT  Outcome: Ongoing (interventions implemented as appropriate)    08/16/17 1556   Gait Training PT LTG   Gait Training Goal PT LTG, Date Established 08/16/17   Gait Training Goal PT LTG, Time to Achieve 2 wks   Gait Training Goal PT LTG, [place] Level contact guard assist   Gait Training Goal PT LTG, Assist Device walker, rolling   Gait Training Goal PT LTG, Distance to  Achieve 500   Gait Training Goal PT LTG, Outcome goal ongoing

## 2017-08-16 NOTE — PLAN OF CARE
Problem: Patient Care Overview (Adult)  Goal: Plan of Care Review  Outcome: Ongoing (interventions implemented as appropriate)    08/16/17 0516   Coping/Psychosocial Response Interventions   Plan Of Care Reviewed With patient;spouse   Patient Care Overview   Progress improving   Outcome Evaluation   Outcome Summary/Follow up Plan Arrived from ED 2300. glucose 517. DKA protocol initiated, insulin gtt started. glucose now in 140s. VSS. h/h 6.9/21.3 this AM, will get 1 unit PRBCs.         Problem: Diabetes, Type 2 (Adult)  Goal: Signs and Symptoms of Listed Potential Problems Will be Absent or Manageable (Diabetes, Type 2)  Outcome: Ongoing (interventions implemented as appropriate)    Problem: Fluid Volume Deficit (Adult)  Goal: Identify Related Risk Factors and Signs and Symptoms  Outcome: Outcome(s) achieved Date Met:  08/16/17  Goal: Fluid/Electrolyte Balance  Outcome: Ongoing (interventions implemented as appropriate)  Goal: Comfort/Well Being  Outcome: Ongoing (interventions implemented as appropriate)
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
## 2017-08-17 ENCOUNTER — APPOINTMENT (OUTPATIENT)
Dept: GENERAL RADIOLOGY | Facility: HOSPITAL | Age: 72
End: 2017-08-17

## 2017-08-17 LAB
ABO + RH BLD: NORMAL
ANION GAP SERPL CALCULATED.3IONS-SCNC: 7 MMOL/L (ref 3–11)
BASOPHILS # BLD AUTO: 0.04 10*3/MM3 (ref 0–0.2)
BASOPHILS NFR BLD AUTO: 0.3 % (ref 0–1)
BH BB BLOOD EXPIRATION DATE: NORMAL
BH BB BLOOD TYPE BARCODE: 7300
BH BB DISPENSE STATUS: NORMAL
BH BB PRODUCT CODE: NORMAL
BH BB UNIT NUMBER: NORMAL
BUN BLD-MCNC: 16 MG/DL (ref 9–23)
BUN/CREAT SERPL: 13.3 (ref 7–25)
CA-I SERPL ISE-MCNC: 1.23 MMOL/L (ref 1.12–1.32)
CALCIUM SPEC-SCNC: 8.9 MG/DL (ref 8.7–10.4)
CHLORIDE SERPL-SCNC: 103 MMOL/L (ref 99–109)
CO2 SERPL-SCNC: 23 MMOL/L (ref 20–31)
CREAT BLD-MCNC: 1.2 MG/DL (ref 0.6–1.3)
CROSSMATCH INTERPRETATION: NORMAL
DEPRECATED RDW RBC AUTO: 47.5 FL (ref 37–54)
EOSINOPHIL # BLD AUTO: 0.33 10*3/MM3 (ref 0–0.3)
EOSINOPHIL NFR BLD AUTO: 2.8 % (ref 0–3)
ERYTHROCYTE [DISTWIDTH] IN BLOOD BY AUTOMATED COUNT: 15.8 % (ref 11.3–14.5)
GFR SERPL CREATININE-BSD FRML MDRD: 44 ML/MIN/1.73
GLUCOSE BLD-MCNC: 36 MG/DL (ref 70–100)
GLUCOSE BLDC GLUCOMTR-MCNC: 109 MG/DL (ref 70–130)
GLUCOSE BLDC GLUCOMTR-MCNC: 116 MG/DL (ref 70–130)
GLUCOSE BLDC GLUCOMTR-MCNC: 117 MG/DL (ref 70–130)
GLUCOSE BLDC GLUCOMTR-MCNC: 170 MG/DL (ref 70–130)
GLUCOSE BLDC GLUCOMTR-MCNC: 180 MG/DL (ref 70–130)
GLUCOSE BLDC GLUCOMTR-MCNC: 232 MG/DL (ref 70–130)
GLUCOSE BLDC GLUCOMTR-MCNC: 34 MG/DL (ref 70–130)
HCT VFR BLD AUTO: 27.6 % (ref 34.5–44)
HGB BLD-MCNC: 9 G/DL (ref 11.5–15.5)
IMM GRANULOCYTES # BLD: 0.16 10*3/MM3 (ref 0–0.03)
IMM GRANULOCYTES NFR BLD: 1.4 % (ref 0–0.6)
LYMPHOCYTES # BLD AUTO: 2.83 10*3/MM3 (ref 0.6–4.8)
LYMPHOCYTES NFR BLD AUTO: 24.1 % (ref 24–44)
MAGNESIUM SERPL-MCNC: 1.8 MG/DL (ref 1.3–2.7)
MCH RBC QN AUTO: 26.9 PG (ref 27–31)
MCHC RBC AUTO-ENTMCNC: 32.6 G/DL (ref 32–36)
MCV RBC AUTO: 82.4 FL (ref 80–99)
MONOCYTES # BLD AUTO: 1.01 10*3/MM3 (ref 0–1)
MONOCYTES NFR BLD AUTO: 8.6 % (ref 0–12)
NEUTROPHILS # BLD AUTO: 7.35 10*3/MM3 (ref 1.5–8.3)
NEUTROPHILS NFR BLD AUTO: 62.8 % (ref 41–71)
PHOSPHATE SERPL-MCNC: 1.8 MG/DL (ref 2.4–5.1)
PLATELET # BLD AUTO: 538 10*3/MM3 (ref 150–450)
PMV BLD AUTO: 8.2 FL (ref 6–12)
POTASSIUM BLD-SCNC: 3.9 MMOL/L (ref 3.5–5.5)
RBC # BLD AUTO: 3.35 10*6/MM3 (ref 3.89–5.14)
SODIUM BLD-SCNC: 133 MMOL/L (ref 132–146)
UNIT  ABO: NORMAL
UNIT  RH: NORMAL
WBC NRBC COR # BLD: 11.72 10*3/MM3 (ref 3.5–10.8)

## 2017-08-17 PROCEDURE — 63710000001 INSULIN REGULAR HUMAN PER 5 UNITS: Performed by: INTERNAL MEDICINE

## 2017-08-17 PROCEDURE — 63710000001 INSULIN DETEMIR PER 5 UNITS: Performed by: INTERNAL MEDICINE

## 2017-08-17 PROCEDURE — 87077 CULTURE AEROBIC IDENTIFY: CPT | Performed by: NURSE PRACTITIONER

## 2017-08-17 PROCEDURE — 87086 URINE CULTURE/COLONY COUNT: CPT | Performed by: NURSE PRACTITIONER

## 2017-08-17 PROCEDURE — 99233 SBSQ HOSP IP/OBS HIGH 50: CPT | Performed by: INTERNAL MEDICINE

## 2017-08-17 PROCEDURE — 87186 SC STD MICRODIL/AGAR DIL: CPT | Performed by: NURSE PRACTITIONER

## 2017-08-17 PROCEDURE — 82962 GLUCOSE BLOOD TEST: CPT

## 2017-08-17 PROCEDURE — 82330 ASSAY OF CALCIUM: CPT | Performed by: NURSE PRACTITIONER

## 2017-08-17 PROCEDURE — 83735 ASSAY OF MAGNESIUM: CPT | Performed by: NURSE PRACTITIONER

## 2017-08-17 PROCEDURE — 97110 THERAPEUTIC EXERCISES: CPT

## 2017-08-17 PROCEDURE — 85025 COMPLETE CBC W/AUTO DIFF WBC: CPT | Performed by: NURSE PRACTITIONER

## 2017-08-17 PROCEDURE — 25010000002 HEPARIN (PORCINE) PER 1000 UNITS: Performed by: NURSE PRACTITIONER

## 2017-08-17 PROCEDURE — 84100 ASSAY OF PHOSPHORUS: CPT | Performed by: NURSE PRACTITIONER

## 2017-08-17 PROCEDURE — G0108 DIAB MANAGE TRN  PER INDIV: HCPCS | Performed by: REGISTERED NURSE

## 2017-08-17 PROCEDURE — 80048 BASIC METABOLIC PNL TOTAL CA: CPT | Performed by: NURSE PRACTITIONER

## 2017-08-17 PROCEDURE — 63710000001 INSULIN LISPRO (HUMAN) PER 5 UNITS: Performed by: INTERNAL MEDICINE

## 2017-08-17 RX ORDER — VALSARTAN 160 MG/1
160 TABLET ORAL DAILY
Status: DISCONTINUED | OUTPATIENT
Start: 2017-08-17 | End: 2017-08-22 | Stop reason: HOSPADM

## 2017-08-17 RX ORDER — DEXTROSE MONOHYDRATE 25 G/50ML
INJECTION, SOLUTION INTRAVENOUS
Status: COMPLETED
Start: 2017-08-17 | End: 2017-08-17

## 2017-08-17 RX ADMIN — LEVOTHYROXINE SODIUM 50 MCG: 50 TABLET ORAL at 05:22

## 2017-08-17 RX ADMIN — DULOXETINE HYDROCHLORIDE 30 MG: 30 CAPSULE, DELAYED RELEASE ORAL at 08:24

## 2017-08-17 RX ADMIN — CYCLOBENZAPRINE HYDROCHLORIDE 10 MG: 10 TABLET, FILM COATED ORAL at 10:18

## 2017-08-17 RX ADMIN — FAMOTIDINE 20 MG: 20 TABLET ORAL at 20:15

## 2017-08-17 RX ADMIN — INSULIN LISPRO 2 UNITS: 100 INJECTION, SOLUTION INTRAVENOUS; SUBCUTANEOUS at 20:19

## 2017-08-17 RX ADMIN — FAMOTIDINE 20 MG: 20 TABLET ORAL at 08:23

## 2017-08-17 RX ADMIN — HEPARIN SODIUM 5000 UNITS: 5000 INJECTION, SOLUTION INTRAVENOUS; SUBCUTANEOUS at 14:58

## 2017-08-17 RX ADMIN — POTASSIUM PHOSPHATE, MONOBASIC AND POTASSIUM PHOSPHATE, DIBASIC 15 MMOL: 224; 236 INJECTION, SOLUTION INTRAVENOUS at 20:16

## 2017-08-17 RX ADMIN — ATORVASTATIN CALCIUM 10 MG: 10 TABLET, FILM COATED ORAL at 20:15

## 2017-08-17 RX ADMIN — MAGNESIUM SULFATE IN WATER 4 G: 40 INJECTION, SOLUTION INTRAVENOUS at 10:16

## 2017-08-17 RX ADMIN — HEPARIN SODIUM 5000 UNITS: 5000 INJECTION, SOLUTION INTRAVENOUS; SUBCUTANEOUS at 20:15

## 2017-08-17 RX ADMIN — CLOPIDOGREL BISULFATE 75 MG: 75 TABLET ORAL at 08:23

## 2017-08-17 RX ADMIN — CYCLOBENZAPRINE HYDROCHLORIDE 10 MG: 10 TABLET, FILM COATED ORAL at 20:15

## 2017-08-17 RX ADMIN — INSULIN HUMAN 4 UNITS: 100 INJECTION, SOLUTION PARENTERAL at 16:47

## 2017-08-17 RX ADMIN — INSULIN DETEMIR 10 UNITS: 100 INJECTION, SOLUTION SUBCUTANEOUS at 20:15

## 2017-08-17 RX ADMIN — INSULIN HUMAN 2 UNITS: 100 INJECTION, SOLUTION PARENTERAL at 11:58

## 2017-08-17 RX ADMIN — HEPARIN SODIUM 5000 UNITS: 5000 INJECTION, SOLUTION INTRAVENOUS; SUBCUTANEOUS at 05:21

## 2017-08-17 RX ADMIN — ASPIRIN 81 MG: 81 TABLET, COATED ORAL at 08:23

## 2017-08-17 RX ADMIN — INSULIN DETEMIR 10 UNITS: 100 INJECTION, SOLUTION SUBCUTANEOUS at 08:24

## 2017-08-17 RX ADMIN — RALOXIFENE HYDROCHLORIDE 60 MG: 60 TABLET, FILM COATED ORAL at 08:24

## 2017-08-17 RX ADMIN — VALSARTAN 160 MG: 160 TABLET, FILM COATED ORAL at 10:18

## 2017-08-17 RX ADMIN — DEXTROSE MONOHYDRATE: 25 INJECTION, SOLUTION INTRAVENOUS at 05:17

## 2017-08-17 NOTE — CONSULTS
"Diabetes Education  Assessment/Teaching    Patient Name:  Paulina Johnson  YOB: 1945  MRN: [id number]  Admit Date:  8/15/2017      Assessment Date:  8/17/2017       Most Recent Value    General Information      Referral From:  A1c, Blood glucose, MD order    Height  5' 2\" (1.575 m)    Height Method  Stated    Weight  134 lb 11.2 oz (61.1 kg)    Weight Method  Bed scale    Pregnancy Assessment     Diabetes History     What type of diabetes do you have?  Type 2    Length of Diabetes Diagnosis  10 + years    Current DM knowledge  fair    Have you had diabetes education/teaching in the past?  no    Do you test your blood sugar at home?  yes    Frequency of checks  2 times daily    Meter type  Unknown    Who performs the test?  self    Typical readings  200s to 300s    Have you had low blood sugar? (<70mg/dl)  yes    How often do you have low blood sugar?  occasionally    Have you had high blood sugar? (>140mg/dl)  yes    How often do you have high blood sugar?  frequently    When was your last high blood sugar?  yesterday    How would you rate your diabetes control?  poor    How often do you check your feet?  weekly    Education Preferences     What areas of diabetes would you like to learn about?  avoiding high blood sugar, diabetes complications, diet information    Nutrition Information     Assessment Topics     Healthy Eating - Assessment  Needs education    Being Active - Assessment  Needs education    Taking Medication - Assessment  Competent    Problem Solving - Assessment  Needs education    Reducing Risk - Assessment  Needs education    Healthy Coping - Assessment  Needs education    Monitoring - Assessment  Competent    DM Goals     Contact Plan  Follow-up medical care, Offered/declined               Most Recent Value    DM Education Needs     Meter  Has own    Meter Type  Other (comment) [doesn't remember]    Frequency of Testing  2 times a day    Blood Glucose Target Range  80 to 130    " Medication  Oral, Insulin, Actions, Side effects, Administration, Vial    Problem Solving  Hypoglycemia, Hyperglycemia, Sick days, Signs, Symptoms, Treatment    Reducing Risks  A1C testing, Lipids, Blood pressure, Eye exam, Dental exam, Foot care, Immunizations    Physical Activity  Walking    Physical Activity Frequency  Occasionally    Healthy Coping  Appropriate    Discharge Plan  Home    Motivation  Moderate    Teaching Method  Explanation, Discussion, Handouts, Teach back    Patient Response  Verbalized understanding, Needs reinforcement        Ms. Johnson states she has been Type 2 diabetic for 30+ years. She is on levemir and glucophage. She is competent in insulin administration and using her meter. She states she checks her BG levels 2 times daily. She does not test her ketones, although she had years ago. Her endocrinologist is Dr. Anmol Baires.   Discussed and taught patient about type 2 diabetes self-management, risk factors, and importance of blood glucose control to reduce complications. Target blood glucose readings and A1c goals per ADA were reviewed. Reviewed with patient current A1c of 11% and discussed its significance. Signs, symptoms and treatment of hyperglycemia and hypoglycemia were discussed. Lifestyle changes such as physical activity with MD approval and healthy eating were encouraged. Stressed the importance of strict blood sugar control to prevent complications such as DKA.  Pt instructed to  check blood sugar 4 times per day and to call PCP if  Blood glucose is higher than 180 two times or more. Also to discuss with him if he wants her to check for ketones at home.  Patient was encouraged to keep record of blood glucose readings to take to follow up appointment with PCP.  Pt was offered a free op follow up appointment however declined at this time.        Electronically signed by:  Maddie Arevalo RN  08/17/17 10:30 AM

## 2017-08-17 NOTE — PLAN OF CARE
"Problem: Patient Care Overview (Adult)  Goal: Plan of Care Review  Outcome: Ongoing (interventions implemented as appropriate)    08/17/17 0626   Coping/Psychosocial Response Interventions   Plan Of Care Reviewed With patient   Patient Care Overview   Progress no change   Outcome Evaluation   Outcome Summary/Follow up Plan patient did well through the PM. C/o of pain, tolerated pain meds well. BG this AM was critically low @36, retake was 34. Given amp of D50, recheck was 117. Pt did not feel \"sick\" but was diaphoretic.          Problem: Diabetes, Type 2 (Adult)  Goal: Signs and Symptoms of Listed Potential Problems Will be Absent or Manageable (Diabetes, Type 2)  Outcome: Ongoing (interventions implemented as appropriate)    Problem: Fluid Volume Deficit (Adult)  Goal: Fluid/Electrolyte Balance  Outcome: Ongoing (interventions implemented as appropriate)  Goal: Comfort/Well Being  Outcome: Ongoing (interventions implemented as appropriate)    Problem: Pressure Ulcer Risk (Ivan Scale) (Adult,Obstetrics,Pediatric)  Goal: Identify Related Risk Factors and Signs and Symptoms  Outcome: Outcome(s) achieved Date Met:  08/17/17  Goal: Skin Integrity  Outcome: Ongoing (interventions implemented as appropriate)      "

## 2017-08-17 NOTE — PROGRESS NOTES
"Adult Nutrition  Assessment/PES    Patient Name:  Paulina Johnson  YOB: 1945  MRN: [id number]  Admit Date:  8/15/2017    Assessment Date:  8/17/2017        Reason for Assessment       08/17/17 1339    Reason for Assessment    Reason For Assessment/Visit follow up protocol;multidisciplinary rounds    Time Spent (min) 30    Diagnosis --   per notes this adm    Gastrointestinal --   s/p gastric emptying study (8/16)- Prolonged lag phase and then marked, rapid gastric emptying   Principal Problem:    Type 2 diabetes mellitus with ketoacidosis without coma  Active Problems:    S/P right L4-5 laminotomy with medial facetectomy, foraminotomy and L4-5 diskectomy    Hypothyroid    HTN (hypertension)    Hyperlipidemia    H/O: stroke    Acute on chronic kidney failure    Non-intractable vomiting with nausea             Nutrition/Diet History       08/17/17 1342    Nutrition/Diet History    Reported/Observed By Patient    Appetite Improved    Food Habit/Preferences Uses Supplements   pt reports that she used to drink chocolate Glucerna 2x/day at home, states that she hasn't had any since her first back surgery (per EMR- Dec 2016)    Other Pt reports no N/V today, noted pt almost all of lunch tray today. Pt appears confused during conversation. States that she has had N/V with all foods for the past 2 months (\"since 2 weeks before her last surgery\", which is noted per EMR to be July 2017).        Anthropometrics       08/17/17 1347    Anthropometrics    Height 157.5 cm (62\")    RD Documented Current Weight  60.8 kg (134 lb)   per bedscale on (8/16)    Ideal Body Weight (IBW)    Ideal Body Weight (IBW), Female 50.83    Usual Body Weight (UBW)    Usual Body Weight --   Pt reports has lost 35-40# since Dec 2016. Pt reports prior to losing wt she weighed= 160#. Per EMR MD note (12/16/16) pt weighed= 145#.  Pt states that lowest wt she got to was 145#, however current wt= 134#. RD not able to verify wt loss hx.     " Weight Loss 4.99 kg (11 lb); per EMR    % Weight Loss  8 %    Weight Loss Time Frame 8 months              Labs/Tests/Procedures/Meds       08/17/17 1350    Labs/Tests/Procedures/Meds    Labs/Tests Review Reviewed    Medication Review Reviewed, pertinent     Results from last 7 days  Lab Units 08/17/17  0333  08/16/17  0326   SODIUM mmol/L 133  < > 133   POTASSIUM mmol/L 3.9  < > 4.2   CHLORIDE mmol/L 103  < > 100   CO2 mmol/L 23.0  < > 22.0   BUN mg/dL 16  < > 29*   CREATININE mg/dL 1.20  < > 1.60*   CALCIUM mg/dL 8.9  < > 8.9   BILIRUBIN mg/dL  --   --  0.1*   ALK PHOS U/L  --   --  103*   ALT (SGPT) U/L  --   --  14   AST (SGOT) U/L  --   --  17   GLUCOSE mg/dL 36*  < > 181*   < > = values in this interval not displayed.             Nutrition Prescription Ordered       08/17/17 1350    Nutrition Prescription PO    Current PO Diet Regular    Common Modifiers Consistent Carbohydrate            Evaluation of Received Nutrient/Fluid Intake       08/17/17 1351    PO Evaluation    Number of Meals 1    % PO Intake 75              Problem/Interventions:        Problem 1       08/17/17 1351    Nutrition Diagnoses Problem 1    Problem 1 Altered Nutrition Related to Labs    Etiology (related to) --   ?DM medication adminstartion/lifestyle/dietary choices    Signs/Symptoms (evidenced by) Biochemical    Specific Labs Noted HgbA1C   11%                    Intervention Goal       08/17/17 1351    Intervention Goal    General Nutrition support treatment    PO Tolerate PO            Nutrition Intervention       08/17/17 1351    Nutrition Intervention    RD/Tech Action Advise alternate selection;Interview for preference;Menu provided;Menu adjusted;Supplement provided;Recommend/ordered;Care plan reviewd;Encourage intake;Follow Tx progress    Recommended/Ordered Supplement            Nutrition Prescription       08/17/17 1351    Nutrition Prescription PO    PO Prescription Begin/change supplement    Supplement Boost Glucose Control    chocolate    Supplement Frequency 2 times a day    New PO Prescription Ordered? Yes            Education/Evaluation       08/17/17 8386    Education    Education Will Instruct as appropriate    Monitor/Evaluation    Monitor Per protocol;PO intake;Supplement intake;Pertinent labs;Weight          Electronically signed by:  Ialna Murillo MS RD/LD [facility]  08/17/17 1:53 PM

## 2017-08-17 NOTE — SIGNIFICANT NOTE
"Notified by nursing that the patient had a witnessed slip/fall in which she landed on her left hip.  Upon examination there is no noted area of injury, however there is some slight/mild tenderness to palpation.  I offered the patient x-ray examination of this area for further evaluation and will order this however she has stated to me that she \"just slipped\", and does not wish to have x-rays.  I stated to her that it is within her right to refuse but that I had to offer this.    MILAN Breaux, ACNP-BC  Pulmonary and Critical Care Service  8/17/2017 6:39 PM  "

## 2017-08-17 NOTE — PROGRESS NOTES
"Critical Care Note     LOS: 2 days   Patient Care Team:  Hernan Thompson MD as PCP - General (Internal Medicine)  Hernan Thompson MD as Referring Physician (Internal Medicine)    Chief Complaint/Reason for visit:    Chief Complaint   Patient presents with   • Hyperglycemia   DKA    Subjective   71-year-old woman with diabetes, hypothyroidism, hypertension who presented to the emergency room with a blood sugar of 940. She reports persistent nausea and vomiting with every meal for the last 2 weeks. She has lost 40 pounds in the last 3 months. She had back surgery in July for disc disease and still has sciatica.  Interval History:   She was transitioned to long-acting and sliding scale yesterday.  She dropped her blood sugar to 36 last night and required an amp of D50. She has had no further vomiting.      Review of Systems:    All systems were reviewed and negative except as noted in subjective.    Medical history, surgical history, social history, family history reviewed    Objective     Intake/Output:    Intake/Output Summary (Last 24 hours) at 08/17/17 1643  Last data filed at 08/17/17 1400   Gross per 24 hour   Intake           1437.9 ml   Output             2075 ml   Net           -637.1 ml       Nutrition:Diet Regular; Consistent Carbohydrate    Diet Regular; Consistent Carbohydrate  Infusions:    lactated ringers with KCl 20 mEq/L 75 mL/hr Last Rate: 75 mL/hr (08/16/17 1110)   sodium chloride 75 mL/hr Last Rate: 75 mL/hr (08/16/17 1502)   sodium chloride 30 mL/hr Last Rate: 30 mL/hr (08/16/17 2114)   sodium chloride 30 mL/hr        Mechanical Ventilator Settings:    RA  Telemetry:  Sinus Rhythm: normal sinus rhythm          Vital Signs  Blood pressure 140/74, pulse 91, temperature 98.7 °F (37.1 °C), temperature source Oral, resp. rate 16, height 62\" (157.5 cm), weight 134 lb 11.2 oz (61.1 kg), SpO2 97 %.    Physical Exam:  General Appearance:  Well-developed older white woman in no distress  "   Head:  Normocephalic, atraumatic    Eyes:          Pupils equal and reactive to light, no jaundice    Ears:     Throat: Mucous membranes moist    Neck: Supple, trachea midline, no palpable thyroid    Back:      Lungs:   Symmetric chest excursion without wheeze or rhonchi     Heart:  Regular, normal S1, S2, no murmur    Abdomen:   Bowel sounds active, nondistended, soft    Rectal:   Deferred   Extremities: No pitting edema or cyanosis    Pulses: Pedal pulses present    Skin: Warm and dry    Lymph nodes: No cervical or supraclavicular adenopathy    Neurologic: Alert, oriented ×4, follows commands with all extremities       Results Review:     I reviewed the patient's new clinical results.     Results from last 7 days  Lab Units 08/17/17  0333 08/16/17  0623 08/16/17  0326  08/15/17  1712 08/15/17  1023   SODIUM mmol/L 133 134 133  < > 118* 122*   POTASSIUM mmol/L 3.9 4.2 4.2  < > 5.9* 5.7*   CHLORIDE mmol/L 103 102 100  < > 79* 81*   CO2 mmol/L 23.0 22.0 22.0  < > 10.0* 12.0*   BUN mg/dL 16 28* 29*  < > 47* 45*   CREATININE mg/dL 1.20 1.60* 1.60*  < > 2.70* 2.30*   CALCIUM mg/dL 8.9 8.9 8.9  < > 9.4 9.2   BILIRUBIN mg/dL  --   --  0.1*  --  0.1* 0.2*   ALK PHOS U/L  --   --  103*  --  148* 141*   ALT (SGPT) U/L  --   --  14  --  17 14   AST (SGOT) U/L  --   --  17  --  15 17   GLUCOSE mg/dL 36* 149* 181*  < > 1047* 942*   < > = values in this interval not displayed.    Results from last 7 days  Lab Units 08/17/17  0333 08/16/17  1903 08/16/17  0326 08/15/17  1712   WBC 10*3/mm3 11.72*  --  11.51* 13.03*   HEMOGLOBIN g/dL 9.0* 8.9* 6.9* 8.4*   HEMATOCRIT % 27.6* 27.2* 21.3* 29.3*   PLATELETS 10*3/mm3 538*  --  628* 793*   MONOCYTES % %  --   --   --  2.0         Lab Results   Component Value Date    BLOODCX No growth at 24 hours 08/15/2017    BLOODCX No growth at 24 hours 08/15/2017     No results found for: URINECX    I reviewed the patient's new imaging including images and reports.    IMPRESSION:  Some improvement  seen in the appearance of the perihilar  regions bilaterally. Prominence remains. Bronchial cuffing suggesting  possibly a viral bronchiolitis or perihilar infiltrate. Continued  follow-up is recommended to resolution.      D:  08/16/2017  IMPRESSION:  Prolonged lag phase and then marked, rapid gastric emptying,  as noted above. T-1/2 of 39 minutes and total emptying of 95% at 90  minutes is well within expected limits.      DICTATED:     08/16/2017  All medications reviewed.     aspirin 81 mg Oral Daily   atorvastatin 10 mg Oral Nightly   clopidogrel 75 mg Oral Daily   DULoxetine 30 mg Oral Daily   famotidine 20 mg Oral BID   heparin (porcine) 5,000 Units Subcutaneous Q8H   insulin detemir 10 Units Subcutaneous Q12H   insulin regular 0-9 Units Subcutaneous 4x Daily With Meals & Nightly   levothyroxine 50 mcg Oral Daily   raloxifene 60 mg Oral Daily   valsartan 160 mg Oral Daily         Assessment/Plan     Principal Problem:    Type 2 diabetes mellitus with ketoacidosis without coma  Active Problems:    Acute on chronic kidney failure    S/P right L4-5 laminotomy with medial facetectomy, foraminotomy and L4-5 diskectomy    Hypothyroid    Non-intractable vomiting with nausea    HTN (hypertension)    Hyperlipidemia    H/O: stroke      DKA, in type 2 DM  blood sugar has improved and anion gap now normal. Poorly controlled diabetes with a1c 11. She reports that she checks her sugar twice daily but I think this is unlikely. Insulin drip was discontinued and long-acting with sliding scale arranged. Blood sugar decreased 36 overnight and she required an amp of D50    Acute on chronic kidney disease baseline serum creatinine is 1.8-1.9, admission was 2.3. Current creatinine is 1.2. Admission urine was negative for protein. Serum creatinine has actually normalized. Without proteinuria, I think chronic kidney disease is unlikely.    Chronic nausea and vomiting she could have gastroparesis from her diabetes. She was not taking  narcotics at home. Her gastric emptying scan reveals a prolonged lag phase but then prompt and marked emptying.    Hypothyroid, TSH was 9 indicating inadequate replacement. Either she was not taking her 50 µg or she needs a higher dose    Hypertension, initially she was mildly hypotensive. However with hydration blood pressure is now 150/87. At home she was on Diovan and this was restarted      Acute on chronic anemia. In December 2016 her hematocrit was 28.5%, hemoglobin 9, 8/16 her hemoglobin is down to 6.9, and she received 1 unit. She clearly has a chronic anemia but also a decline compared to December however unchanged compared to July of this year.       PLAN:  Add a bedtime snack  Levemir 10 units Q12H  Decrease sliding scale coverage  Plavix, aspirin, statin  Diovan for hypertension  Thyroid replacement  Stop IV fluids  Stool for occult blood  If no hypoglycemia tonight, telemetry tomorrow      VTE Prophylaxis:SQ heparin    Stress Ulcer Prophylaxis:boris Beverly MD  08/17/17  4:43 PM      Time:30min  I personally provided care to this critically ill patient as documented above.  Critical care time does not include time spent on separately billed procedures.  Non of my critical care time was concurrent with other critical care providers.

## 2017-08-17 NOTE — PLAN OF CARE
Problem: Patient Care Overview (Adult)  Goal: Plan of Care Review  Outcome: Ongoing (interventions implemented as appropriate)    08/17/17 1318   Coping/Psychosocial Response Interventions   Plan Of Care Reviewed With patient   Patient Care Overview   Progress improving   Outcome Evaluation   Outcome Summary/Follow up Plan Pt increased ambulation distance to 400ft with RW and Jerome. Pt requires assistance to steer RW and VC's to keep walker close. Pt limited by decreased cognition and fatigue.          Problem: Inpatient Physical Therapy  Goal: Bed Mobility Goal LTG- PT  Outcome: Ongoing (interventions implemented as appropriate)    08/16/17 1556 08/17/17 1318   Bed Mobility PT LTG   Bed Mobility PT LTG, Date Established 08/16/17 --    Bed Mobility PT LTG, Time to Achieve 2 wks --    Bed Mobility PT LTG, Activity Type roll left/roll right;supine to sit/sit to supine --    Bed Mobility PT LTG, [place] Level supervision required --    Bed Mobility PT LTG, Outcome --  goal ongoing       Goal: Transfer Training Goal 1 LTG- PT  Outcome: Ongoing (interventions implemented as appropriate)    08/16/17 1556 08/17/17 1318   Transfer Training PT LTG   Transfer Training PT LTG, Date Established 08/16/17 --    Transfer Training PT LTG, Time to Achieve 2 wks --    Transfer Training PT LTG, Activity Type bed to chair /chair to bed;sit to stand/stand to sit --    Transfer Training PT LTG, [place] Level supervision required --    Transfer Training PT LTG, Assist Device walker, rolling --    Transfer Training PT LTG, Outcome --  goal ongoing       Goal: Gait Training Goal LTG- PT  Outcome: Ongoing (interventions implemented as appropriate)    08/16/17 1556 08/17/17 1318   Gait Training PT LTG   Gait Training Goal PT LTG, Date Established 08/16/17 --    Gait Training Goal PT LTG, Time to Achieve 2 wks --    Gait Training Goal PT LTG, [place] Level contact guard assist --    Gait Training Goal PT LTG, Assist Device  walker, rolling --    Gait Training Goal PT LTG, Distance to Achieve 500 --    Gait Training Goal PT LTG, Outcome --  goal ongoing

## 2017-08-17 NOTE — PLAN OF CARE
Problem: Patient Care Overview (Adult)  Goal: Plan of Care Review  Outcome: Ongoing (interventions implemented as appropriate)    08/17/17 1514   Coping/Psychosocial Response Interventions   Plan Of Care Reviewed With patient   Patient Care Overview   Progress improving   Outcome Evaluation   Outcome Summary/Follow up Plan Patient ambulated in halls with  ft. Diet advanced to regular consistency. Started on Diovan for HTN. IVF discontinued. Patient became more confused as the day has progressed.           Problem: Diabetes, Type 2 (Adult)  Goal: Signs and Symptoms of Listed Potential Problems Will be Absent or Manageable (Diabetes, Type 2)  Outcome: Ongoing (interventions implemented as appropriate)    Problem: Fluid Volume Deficit (Adult)  Goal: Fluid/Electrolyte Balance  Outcome: Ongoing (interventions implemented as  appropriate)  Goal: Comfort/Well Being  Outcome: Ongoing (interventions implemented as appropriate)    Problem: Pressure Ulcer Risk (Ivan Scale) (Adult,Obstetrics,Pediatric)  Goal: Skin Integrity  Outcome: Ongoing (interventions implemented as appropriate

## 2017-08-17 NOTE — THERAPY TREATMENT NOTE
Acute Care - Physical Therapy Treatment Note  [facility]     Patient Name: Paulina Johnson  : 1945  MRN: [id number]  Today's Date: 2017  Onset of Illness/Injury or Date of Surgery Date: 08/15/17  Date of Referral to PT: 08/15/17  Referring Physician: MD Ishan    Admit Date: 8/15/2017    Visit Dx:    ICD-10-CM ICD-9-CM   1. Type 2 diabetes mellitus with ketoacidosis without coma, unspecified long term insulin use status E13.10 250.12   2. Nausea and vomiting, intractability of vomiting not specified, unspecified vomiting type R11.2 787.01   3. Confusion R41.0 298.9   4. PANFILO (acute kidney injury) N17.9 584.9   5. Impaired mobility and ADLs Z74.09 799.89   6. Impaired functional mobility, balance, gait, and endurance Z74.09 V49.89     Patient Active Problem List   Diagnosis   • S/P right L4-5 laminotomy with medial facetectomy, foraminotomy and L4-5 diskectomy   • Hypothyroid   • HTN (hypertension)   • Hyperlipidemia   • H/O: stroke   • Right leg pain   • Status post lumbar spinal fusion   • Lumbar stenosis with neurogenic claudication   • Recurrent herniation of lumbar disc   • status post dural rent   • Type 2 diabetes mellitus with ketoacidosis without coma   • Acute on chronic kidney failure   • Non-intractable vomiting with nausea               Adult Rehabilitation Note       17 1124          Rehab Assessment/Intervention    Discipline (P)  physical therapist  -KR      Document Type (P)  therapy note (daily note)  -KR      Subjective Information (P)  agree to therapy;no complaints  -KR      Patient Effort, Rehab Treatment (P)  good  -KR      Symptoms Noted During/After Treatment (P)  fatigue  -KR      Precautions/Limitations (P)  fall precautions;spinal precautions  -KR      Recorded by [KR] Clotilde Naqvi PT Student      Vital Signs    Pre Systolic BP Rehab (P)  164  -KR      Pre Treatment Diastolic BP (P)  82  -KR      Post Systolic BP Rehab (P)  166  -KR      Post Treatment  Diastolic BP (P)  82  -KR      Pretreatment Heart Rate (beats/min) (P)  84  -KR      Posttreatment Heart Rate (beats/min) (P)  93  -KR      Pre SpO2 (%) (P)  98  -KR      O2 Delivery Pre Treatment (P)  room air  -KR      Post SpO2 (%) (P)  93  -KR      O2 Delivery Post Treatment (P)  room air  -KR      Pre Patient Position (P)  Sitting  -KR      Intra Patient Position (P)  Standing  -KR      Post Patient Position (P)  Supine  -KR      Recorded by [KR] Clotilde Naqvi, PT Student      Pain Assessment    Pain Assessment (P)  0-10  -KR      Pain Score (P)  7  -KR      Post Pain Score (P)  7  -KR      Pain Type (P)  Acute pain  -KR      Pain Location (P)  Back  -KR      Pain Intervention(s) (P)  Repositioned;Ambulation/increased activity  -KR      Response to Interventions (P)  tolerated  -KR      Recorded by [KR] Clotilde Naqvi, PT Student      Cognitive Assessment/Intervention    Current Cognitive/Communication Assessment (P)  impaired  -KR      Orientation Status (P)  oriented to;person  -KR      Follows Commands/Answers Questions (P)  able to follow single-step instructions;75% of the time;needs cueing;needs repetition  -KR      Personal Safety (P)  moderate impairment;decreased awareness, need for assist;decreased awareness, need for safety  -KR      Personal Safety Interventions (P)  fall prevention program maintained;gait belt;nonskid shoes/slippers when out of bed  -KR      Recorded by [KR] Clotilde Naqvi, PT Student      Bed Mobility, Assessment/Treatment    Bed Mob, Supine to Sit, [place] (P)  not tested  -KR      Bed Mob, Sit to Supine, [place] (P)  minimum assist (75% patient effort);verbal cues required  -KR      Bed Mobility, Safety Issues (P)  decreased use of arms for pushing/pulling;decreased use of legs for bridging/pushing;cognitive deficits limit understanding  -KR      Bed Mobility, Impairments (P)  strength decreased;impaired balance  -KR      Bed Mobility, Comment (P)  VC's for sequencing  to maintain spinal precautions.  -KR      Recorded by [KR] Clotilde Naqvi, PT Student      Transfer Assessment/Treatment    Transfers, Sit-Stand Cross (P)  minimum assist (75% patient effort);verbal cues required  -KR      Transfers, Stand-Sit Cross (P)  minimum assist (75% patient effort);verbal cues required  -KR      Transfers, Sit-Stand-Sit, Assist Device (P)  rolling walker  -KR      Toilet Transfer, Cross (P)  minimum assist (75% patient effort);verbal cues required  -KR      Toilet Transfer, Assistive Device (P)  rolling walker  -KR      Transfer, Safety Issues (P)  weight-shifting ability decreased;step length decreased  -KR      Transfer, Impairments (P)  strength decreased;impaired balance  -KR      Transfer, Comment (P)  VC's for sequencing and safety awareness  -KR      Recorded by [KR] Clotilde Naqvi, PT Student      Gait Assessment/Treatment    Gait, Cross Level (P)  minimum assist (75% patient effort);1 person + 1 person to manage equipment;verbal cues required  -KR      Gait, Assistive Device (P)  rolling walker  -KR      Gait, Distance (Feet) (P)  400  -KR      Gait, Gait Pattern Analysis (P)  swing-through gait  -KR      Gait, Gait Deviations (P)  lin decreased;forward flexed posture;step length decreased;weight-shifting ability decreased  -KR      Gait, Safety Issues (P)  step length decreased   keeps RW too far in front  -KR      Gait, Impairments (P)  strength decreased;impaired balance;coordination impaired  -KR      Gait, Comment (P)  Pt requires physical assistance to steer RW; requires VC's to keep walker close.  -KR      Recorded by [KR] Clotilde Naqvi, PT Student      Motor Skills/Interventions    Additional Documentation (P)  Balance Skills Training (Group)  -KR      Recorded by [KR] Clotilde Naqvi, PT Student      Balance Skills Training    Sitting-Level of Assistance (P)  Contact guard  -KR      Sitting-Balance Support (P)  Right upper extremity  supported;Left upper extremity supported  -KR      Standing-Level of Assistance (P)  Contact guard  -KR      Static Standing Balance Support (P)  assistive device  -KR      Recorded by [NEISHA] Clotilde Naqvi PT Arleen      Positioning and Restraints    Pre-Treatment Position (P)  sitting in chair/recliner  -KR      Post Treatment Position (P)  bed  -KR      In Bed (P)  notified nsg;supine;call light within reach;encouraged to call for assist;exit alarm on;side rails up x2  -KR      Recorded by [NEISHA] Clotilde Naqvi PT Arleen        User Key  (r) = Recorded By, (t) = Taken By, (c) = Cosigned By    Initials Name Effective Dates    NEISHA Naqvi, PT Student 05/30/17 -                 IP PT Goals       08/17/17 1318 08/16/17 1556       Bed Mobility PT LTG    Bed Mobility PT LTG, Date Established  08/16/17  -SJ     Bed Mobility PT LTG, Time to Achieve  2 wks  -SJ     Bed Mobility PT LTG, Activity Type  roll left/roll right;supine to sit/sit to supine  -SJ     Bed Mobility PT LTG, [place] Level  supervision required  -SJ     Bed Mobility PT LTG, Outcome (P)  goal ongoing  -KR goal ongoing  -SJ     Transfer Training PT LTG    Transfer Training PT LTG, Date Established  08/16/17  -SJ     Transfer Training PT LTG, Time to Achieve  2 wks  -SJ     Transfer Training PT LTG, Activity Type  bed to chair /chair to bed;sit to stand/stand to sit  -SJ     Transfer Training PT LTG, [place] Level  supervision required  -SJ     Transfer Training PT LTG, Assist Device  walker, rolling  -SJ     Transfer Training PT LTG, Outcome (P)  goal ongoing  -KR goal ongoing  -SJ     Gait Training PT LTG    Gait Training Goal PT LTG, Date Established  08/16/17  -SJ     Gait Training Goal PT LTG, Time to Achieve  2 wks  -SJ     Gait Training Goal PT LTG, [place] Level  contact guard assist  -SJ     Gait Training Goal PT LTG, Assist Device  walker, rolling  -SJ     Gait Training Goal PT LTG, Distance to Achieve  500  -SJ     Gait  Training Goal PT LTG, Outcome (P)  goal ongoing  -KR goal ongoing  -       User Key  (r) = Recorded By, (t) = Taken By, (c) = Cosigned By    Initials Name Provider Type    SJ Valentina Corona, PT Physical Therapist    NEISHA Naqvi, PT Student PT Student          Physical Therapy Education     Title: PT OT SLP Therapies (Active)     Topic: Physical Therapy (Active)     Point: Mobility training (Active)    Learning Progress Summary    Learner Readiness Method Response Comment Documented by Status   Patient Acceptance E NR  KR 08/17/17 1318 Active    Acceptance E NR   08/16/17 1559 Active               Point: Home exercise program (Active)    Learning Progress Summary    Learner Readiness Method Response Comment Documented by Status   Patient Acceptance E NR   08/16/17 1559 Active               Point: Body mechanics (Active)    Learning Progress Summary    Learner Readiness Method Response Comment Documented by Status   Patient Acceptance E NR  KR 08/17/17 1318 Active    Acceptance E NR   08/16/17 1559 Active               Point: Precautions (Active)    Learning Progress Summary    Learner Readiness Method Response Comment Documented by Status   Patient Acceptance E NR  KR 08/17/17 1318 Active    Acceptance E NR   08/16/17 1559 Active                      User Key     Initials Effective Dates Name Provider Type Discipline     06/19/15 -  Valentina Corona, PT Physical Therapist PT    KR 05/30/17 -  Clotilde Naqvi, PT Student PT Student PT                    PT Recommendation and Plan  Anticipated Discharge Disposition: inpatient rehabilitation facility  Planned Therapy Interventions: balance training, bed mobility training, gait training, home exercise program, patient/family education, strengthening, transfer training  PT Frequency: daily  Plan of Care Review  Plan Of Care Reviewed With: (P) patient  Progress: (P) improving  Outcome Summary/Follow up Plan: (P) Pt increased ambulation distance to 400ft  with RW and Jerome. Pt requires assistance to steer RW and VC's to keep walker close. Pt limited by decreased cognition and fatigue.           Outcome Measures       08/17/17 1124 08/16/17 1458       How much help from another person do you currently need...    Turning from your back to your side while in flat bed without using bedrails? (P)  3  -KR      Moving from lying on back to sitting on the side of a flat bed without bedrails? (P)  3  -KR      Moving to and from a bed to a chair (including a wheelchair)? (P)  3  -KR      Standing up from a chair using your arms (e.g., wheelchair, bedside chair)? (P)  3  -KR      Climbing 3-5 steps with a railing? (P)  2  -KR      To walk in hospital room? (P)  3  -KR      AM-PAC 6 Clicks Score (P)  17  -KR      How much help from another is currently needed...    Putting on and taking off regular lower body clothing?  2  -CL     Bathing (including washing, rinsing, and drying)  2  -CL     Toileting (which includes using toilet bed pan or urinal)  2  -CL     Putting on and taking off regular upper body clothing  3  -CL     Taking care of personal grooming (such as brushing teeth)  3  -CL     Eating meals  4  -CL     Score  16  -CL     Functional Assessment    Outcome Measure Options (P)  AM-PAC 6 Clicks Basic Mobility (PT)  -KR AM-PAC 6 Clicks Daily Activity (OT)  -CL       User Key  (r) = Recorded By, (t) = Taken By, (c) = Cosigned By    Initials Name Provider Type    MANJEET Boyle OT Occupational Therapist    NEISHA Naqvi, PT Student PT Student           Time Calculation:         PT Charges       08/17/17 1320          Time Calculation    Start Time (P)  1124  -KR      PT Received On (P)  08/17/17  -KR      PT Goal Re-Cert Due Date (P)  08/26/17  -KR      Time Calculation- PT    Total Timed Code Minutes- PT (P)  28 minute(s)  -KR        User Key  (r) = Recorded By, (t) = Taken By, (c) = Cosigned By    Initials Name Provider Type    NEISHA Naqvi, PT Student PT  Student          Therapy Charges for Today     Code Description Service Date Service Provider Modifiers Qty    77760994432 HC PT THER PROC EA 15 MIN 8/17/2017 Clotilde Naqvi, PT Student GP 2    27216067300 HC PT THER SUPP EA 15 MIN 8/17/2017 Clotilde Naqvi, PT Student GP 2          PT G-Codes  Outcome Measure Options: (P) AM-PAC 6 Clicks Basic Mobility (PT)    Aniyah Naqvi PT Student  8/17/2017

## 2017-08-18 ENCOUNTER — APPOINTMENT (OUTPATIENT)
Dept: GENERAL RADIOLOGY | Facility: HOSPITAL | Age: 72
End: 2017-08-18

## 2017-08-18 LAB
ANION GAP SERPL CALCULATED.3IONS-SCNC: 7 MMOL/L (ref 3–11)
BUN BLD-MCNC: 12 MG/DL (ref 9–23)
BUN/CREAT SERPL: 10.9 (ref 7–25)
CALCIUM SPEC-SCNC: 8.7 MG/DL (ref 8.7–10.4)
CHLORIDE SERPL-SCNC: 105 MMOL/L (ref 99–109)
CO2 SERPL-SCNC: 24 MMOL/L (ref 20–31)
CREAT BLD-MCNC: 1.1 MG/DL (ref 0.6–1.3)
DEPRECATED RDW RBC AUTO: 47 FL (ref 37–54)
ERYTHROCYTE [DISTWIDTH] IN BLOOD BY AUTOMATED COUNT: 16 % (ref 11.3–14.5)
GFR SERPL CREATININE-BSD FRML MDRD: 49 ML/MIN/1.73
GLUCOSE BLD-MCNC: 118 MG/DL (ref 70–100)
GLUCOSE BLDC GLUCOMTR-MCNC: 116 MG/DL (ref 70–130)
GLUCOSE BLDC GLUCOMTR-MCNC: 231 MG/DL (ref 70–130)
GLUCOSE BLDC GLUCOMTR-MCNC: 304 MG/DL (ref 70–130)
GLUCOSE BLDC GLUCOMTR-MCNC: 402 MG/DL (ref 70–130)
HCT VFR BLD AUTO: 27.7 % (ref 34.5–44)
HEMOCCULT STL QL: NEGATIVE
HGB BLD-MCNC: 8.9 G/DL (ref 11.5–15.5)
MAGNESIUM SERPL-MCNC: 2.6 MG/DL (ref 1.3–2.7)
MCH RBC QN AUTO: 26.1 PG (ref 27–31)
MCHC RBC AUTO-ENTMCNC: 32.1 G/DL (ref 32–36)
MCV RBC AUTO: 81.2 FL (ref 80–99)
PLATELET # BLD AUTO: 483 10*3/MM3 (ref 150–450)
PMV BLD AUTO: 8.1 FL (ref 6–12)
POTASSIUM BLD-SCNC: 4.3 MMOL/L (ref 3.5–5.5)
RBC # BLD AUTO: 3.41 10*6/MM3 (ref 3.89–5.14)
SODIUM BLD-SCNC: 136 MMOL/L (ref 132–146)
WBC NRBC COR # BLD: 6.84 10*3/MM3 (ref 3.5–10.8)

## 2017-08-18 PROCEDURE — 82272 OCCULT BLD FECES 1-3 TESTS: CPT | Performed by: INTERNAL MEDICINE

## 2017-08-18 PROCEDURE — 83735 ASSAY OF MAGNESIUM: CPT | Performed by: INTERNAL MEDICINE

## 2017-08-18 PROCEDURE — 97530 THERAPEUTIC ACTIVITIES: CPT

## 2017-08-18 PROCEDURE — 25010000002 HEPARIN (PORCINE) PER 1000 UNITS: Performed by: NURSE PRACTITIONER

## 2017-08-18 PROCEDURE — 97116 GAIT TRAINING THERAPY: CPT

## 2017-08-18 PROCEDURE — 80048 BASIC METABOLIC PNL TOTAL CA: CPT | Performed by: INTERNAL MEDICINE

## 2017-08-18 PROCEDURE — 99233 SBSQ HOSP IP/OBS HIGH 50: CPT | Performed by: INTERNAL MEDICINE

## 2017-08-18 PROCEDURE — 85027 COMPLETE CBC AUTOMATED: CPT | Performed by: INTERNAL MEDICINE

## 2017-08-18 PROCEDURE — 82962 GLUCOSE BLOOD TEST: CPT

## 2017-08-18 PROCEDURE — 63710000001 INSULIN LISPRO (HUMAN) PER 5 UNITS: Performed by: INTERNAL MEDICINE

## 2017-08-18 PROCEDURE — 63710000001 INSULIN DETEMIR PER 5 UNITS: Performed by: INTERNAL MEDICINE

## 2017-08-18 RX ORDER — IBUPROFEN 400 MG/1
400 TABLET ORAL EVERY 6 HOURS PRN
Status: DISCONTINUED | OUTPATIENT
Start: 2017-08-18 | End: 2017-08-22 | Stop reason: HOSPADM

## 2017-08-18 RX ORDER — DEXTROSE MONOHYDRATE 25 G/50ML
25 INJECTION, SOLUTION INTRAVENOUS
Status: DISCONTINUED | OUTPATIENT
Start: 2017-08-18 | End: 2017-08-18 | Stop reason: SDUPTHER

## 2017-08-18 RX ORDER — NICOTINE POLACRILEX 4 MG
15 LOZENGE BUCCAL
Status: DISCONTINUED | OUTPATIENT
Start: 2017-08-18 | End: 2017-08-18 | Stop reason: SDUPTHER

## 2017-08-18 RX ADMIN — INSULIN LISPRO 4 UNITS: 100 INJECTION, SOLUTION INTRAVENOUS; SUBCUTANEOUS at 12:11

## 2017-08-18 RX ADMIN — HEPARIN SODIUM 5000 UNITS: 5000 INJECTION, SOLUTION INTRAVENOUS; SUBCUTANEOUS at 05:17

## 2017-08-18 RX ADMIN — INSULIN LISPRO 9 UNITS: 100 INJECTION, SOLUTION INTRAVENOUS; SUBCUTANEOUS at 12:10

## 2017-08-18 RX ADMIN — INSULIN DETEMIR 10 UNITS: 100 INJECTION, SOLUTION SUBCUTANEOUS at 21:06

## 2017-08-18 RX ADMIN — CLOPIDOGREL BISULFATE 75 MG: 75 TABLET ORAL at 08:50

## 2017-08-18 RX ADMIN — HEPARIN SODIUM 5000 UNITS: 5000 INJECTION, SOLUTION INTRAVENOUS; SUBCUTANEOUS at 21:05

## 2017-08-18 RX ADMIN — HEPARIN SODIUM 5000 UNITS: 5000 INJECTION, SOLUTION INTRAVENOUS; SUBCUTANEOUS at 15:37

## 2017-08-18 RX ADMIN — INSULIN DETEMIR 10 UNITS: 100 INJECTION, SOLUTION SUBCUTANEOUS at 09:16

## 2017-08-18 RX ADMIN — ASPIRIN 81 MG: 81 TABLET, COATED ORAL at 08:50

## 2017-08-18 RX ADMIN — INSULIN LISPRO 6 UNITS: 100 INJECTION, SOLUTION INTRAVENOUS; SUBCUTANEOUS at 17:01

## 2017-08-18 RX ADMIN — INSULIN LISPRO 7 UNITS: 100 INJECTION, SOLUTION INTRAVENOUS; SUBCUTANEOUS at 17:00

## 2017-08-18 RX ADMIN — LEVOTHYROXINE SODIUM 50 MCG: 50 TABLET ORAL at 05:16

## 2017-08-18 RX ADMIN — ATORVASTATIN CALCIUM 10 MG: 10 TABLET, FILM COATED ORAL at 21:05

## 2017-08-18 RX ADMIN — RALOXIFENE HYDROCHLORIDE 60 MG: 60 TABLET, FILM COATED ORAL at 08:50

## 2017-08-18 RX ADMIN — DULOXETINE HYDROCHLORIDE 30 MG: 30 CAPSULE, DELAYED RELEASE ORAL at 08:49

## 2017-08-18 RX ADMIN — FAMOTIDINE 20 MG: 20 TABLET ORAL at 08:49

## 2017-08-18 RX ADMIN — FAMOTIDINE 20 MG: 20 TABLET ORAL at 21:05

## 2017-08-18 RX ADMIN — INSULIN LISPRO 3 UNITS: 100 INJECTION, SOLUTION INTRAVENOUS; SUBCUTANEOUS at 08:28

## 2017-08-18 RX ADMIN — VALSARTAN 160 MG: 160 TABLET, FILM COATED ORAL at 08:50

## 2017-08-18 NOTE — PLAN OF CARE
Problem: Patient Care Overview (Adult)  Goal: Plan of Care Review  Outcome: Ongoing (interventions implemented as appropriate)    08/18/17 9162   Coping/Psychosocial Response Interventions   Plan Of Care Reviewed With patient   Patient Care Overview   Progress improving   Outcome Evaluation   Outcome Summary/Follow up Plan ox4 most of day; ambulated with pt; bg high, prandial insulin added and increased, ss increased

## 2017-08-18 NOTE — THERAPY TREATMENT NOTE
Acute Care - Occupational Therapy Treatment Note  [facility]     Patient Name: Paulina Johnson  : 1945  MRN: [id number]  Today's Date: 2017  Onset of Illness/Injury or Date of Surgery Date: 08/15/17  Date of Referral to OT: 08/15/17  Referring Physician: MD Ishan      Admit Date: 8/15/2017    Visit Dx:     ICD-10-CM ICD-9-CM   1. Type 2 diabetes mellitus with ketoacidosis without coma, unspecified long term insulin use status E13.10 250.12   2. Nausea and vomiting, intractability of vomiting not specified, unspecified vomiting type R11.2 787.01   3. Confusion R41.0 298.9   4. PANFILO (acute kidney injury) N17.9 584.9   5. Impaired mobility and ADLs Z74.09 799.89   6. Impaired functional mobility, balance, gait, and endurance Z74.09 V49.89     Patient Active Problem List   Diagnosis   • S/P right L4-5 laminotomy with medial facetectomy, foraminotomy and L4-5 diskectomy   • Hypothyroid   • HTN (hypertension)   • Hyperlipidemia   • H/O: stroke   • Right leg pain   • Status post lumbar spinal fusion   • Lumbar stenosis with neurogenic claudication   • Recurrent herniation of lumbar disc   • status post dural rent   • Type 2 diabetes mellitus with ketoacidosis without coma   • Acute on chronic kidney failure   • Non-intractable vomiting with nausea             Adult Rehabilitation Note       17 1432 17 1430 17 1124    Rehab Assessment/Intervention    Discipline physical therapist  -SJ occupational therapist  -CL physical therapist  -NEISHA ESTRELLA JB2    Document Type  therapy note (daily note)  -CL therapy note (daily note)  -NEISHA ESTRELLA JB2    Subjective Information no complaints;agree to therapy  -SJ agree to therapy;no complaints  -CL agree to therapy;no complaints  -NEISHA ESTRELLA JB2    Patient Effort, Rehab Treatment good  -SJ good  -CL good  -NEISHA ESTRELLA JB2    Symptoms Noted During/After Treatment none  -SJ  fatigue  -NEISHA ESTRELLA JB2    Precautions/Limitations fall precautions;spinal precautions   lumbar spinal  precautions  -SJ fall precautions;spinal precautions;other (see comments)   s/p TLIF on 07/05/17, exit alarm, residual R sided weakness  -CL fall precautions;spinal precautions  -DELORES,KR,JB2    Recorded by [SJ] Valentina Corona, PT [CL] Mary Kate Boyle OT [DELORES,KR,JB2] Lolis Motta, PT (r) Clotilde Naqvi, PT Student (t) Lolis Motta, PT (c)    Vital Signs    Pre Systolic BP Rehab  154  -  -DELORES,KR,JB2    Pre Treatment Diastolic BP  52  -CL 82  -DELORES,KR,JB2    Post Systolic BP Rehab 150  -  -  -DELORES,KR,JB2    Post Treatment Diastolic BP 88  -SJ 88  -CL 82  -DELORES,KR,JB2    Pretreatment Heart Rate (beats/min) 95  -SJ 94  -CL 84  -DELORES,KR,JB2    Posttreatment Heart Rate (beats/min) 101  -SJ 99  -CL 93  -DELORES,KR,JB2    Pre SpO2 (%)  98  -CL 98  -DELORES,KR,JB2    O2 Delivery Pre Treatment  room air  -CL room air  -DELORES,KR,JB2    Post SpO2 (%) 100  -SJ 98  -CL 93  -DELORES,KR,JB2    O2 Delivery Post Treatment room air  -SJ room air  -CL room air  -DELORES,KR,JB2    Pre Patient Position  Supine  -CL Sitting  -DELORES,KR,JB2    Intra Patient Position  Standing  -CL Standing  -DELORES,KR,JB2    Post Patient Position  Sitting  -CL Supine  -DELORES,KR,JB2    Recorded by [SJ] Valentina Corona, PT [CL] Mary Kate Boyle, OT [DELORES,KR,JB2] Lolis Motta, PT (r) Clotilde Naqvi, PT Student (t) Lolis Motta, PT (c)    Pain Assessment    Pain Assessment No/denies pain  -SJ No/denies pain  -CL 0-10  -DELORES,KR,JB2    Pain Score 0  -SJ  7  -DELORES,KR,JB2    Post Pain Score 0  -SJ  7  -DELORES,KR,JB2    Pain Type   Acute pain  -DELORES,KR,JB2    Pain Location   Back  -DELORES,KR,JB2    Pain Intervention(s)   Repositioned;Ambulation/increased activity  -DELORES,KR,JB2    Response to Interventions   tolerated  -DELORES,KR,JB2    Recorded by [SJ] Valentina Corona, PT [CL] Mar yKate Boyle OT [DELORES,KR,JB2] Lolis Motta, PT (r) Clotilde Naqvi PT Student (t) Lolis Motta, PT (c)    Cognitive Assessment/Intervention    Current Cognitive/Communication Assessment impaired  -SJ impaired  -CL  impaired  -DELORES,NEISHA,JB2    Orientation Status oriented to;person  -SJ oriented x 4  -CL oriented to;person  -DELORES,NEISHA,JB2    Follows Commands/Answers Questions 75% of the time;able to follow single-step instructions;needs cueing  -SJ 75% of the time;needs cueing;needs increased time;needs repetition  -CL able to follow single-step instructions;75% of the time;needs cueing;needs repetition  -NEISHA ESTRELLA,JB2    Personal Safety unaware of cognitive deficits;decreased awareness, need for safety;decreased insight to deficits  -SJ moderate impairment;decreased awareness, need for assist;decreased awareness, need for safety;decreased insight to deficits;severe impairment;impulsive   Pt could not recall spinal precautions, tried to bend/twist  -CL moderate impairment;decreased awareness, need for assist;decreased awareness, need for safety  -NEISHA ESTRELLA,DELORES2    Personal Safety Interventions fall prevention program maintained;gait belt;muscle strengthening facilitated;nonskid shoes/slippers when out of bed  -SJ gait belt;fall prevention program maintained;nonskid shoes/slippers when out of bed  -CL fall prevention program maintained;gait belt;nonskid shoes/slippers when out of bed  -DELORES,NEISHA,DELORES2    Recorded by [SJ] Valentina Corona, PT [CL] Mary Kate Boyle OT [DELORES,NEISHA,JB2] Lolis Motta, PT (r) Clotilde Naqvi PT Student (t) Lolis Motta, PT (c)    Bed Mobility, Assessment/Treatment    Bed Mob, Supine to Sit, [place] conditional independence  -SJ supervision required;verbal cues required  -CL not tested  -NEISHA ESTRELLA,JB2    Bed Mob, Sit to Supine, [place]  not tested  -CL minimum assist (75% patient effort);verbal cues required  -DELORES,NEISHA,JB2    Bed Mobility, Safety Issues   decreased use of arms for pushing/pulling;decreased use of legs for bridging/pushing;cognitive deficits limit understanding  -NEISHA ESTRELLA,JB2    Bed Mobility, Impairments   strength decreased;impaired balance  -NEISHA ESTRELLA,JB2    Bed Mobility, Comment Pt impulsively sat up without  concern for spinal precautions.   -SJ Pt sat up into long-sitting impulsively in bed prior to cueing for log-roll technique, however required no assist.   -CL VC's for sequencing to maintain spinal precautions.  -DELORES,KR,JB2    Recorded by [SJ] Valentina Corona PT [CL] Mary Kate Boyle OT [DELORES,KR,JB2] Lolis Motta, PT (r) Clotilde Naqvi, PT Student (t) Lolis Motta, PT (c)    Transfer Assessment/Treatment    Transfers, Sit-Stand [place] contact guard assist;2 person assist required;verbal cues required  -SJ contact guard assist;2 person assist required;verbal cues required  -CL minimum assist (75% patient effort);verbal cues required  -DELORES,KR,JB2    Transfers, Stand-Sit [place] contact guard assist;verbal cues required  -SJ contact guard assist;2 person assist required;verbal cues required  -CL minimum assist (75% patient effort);verbal cues required  -DELORES,KR,JB2    Transfers, Sit-Stand-Sit, Assist Device rolling walker  -SJ rolling walker  -CL rolling walker  -DELORES,KR,JB2    Toilet Transfer, [place] contact guard assist  -SJ contact guard assist;2 person assist required;verbal cues required  -CL minimum assist (75% patient effort);verbal cues required  -DELORES,KR,JB2    Toilet Transfer, Assistive Device bedside commode without drop arms;rolling walker  -SJ rolling walker;bedside commode without drop arms  -CL rolling walker  -DELORES,KR,JB2    Transfer, Safety Issues weight-shifting ability decreased;impulsivity  -SJ  weight-shifting ability decreased;step length decreased  -DELORES,KR,JB2    Transfer, Impairments strength decreased;impaired balance  -SJ  strength decreased;impaired balance  -DELORES,KR,JB2    Transfer, Comment Decreased safety awareness, cues for hand placement  -SJ VCs for HP/sequencing.   -CL VC's for sequencing and safety awareness  -DELORES,KR,JB2    Recorded by [SJ] Valentina Corona, PT [CL] Mary Kate Boyle OT [DELORES,KR,JB2] Lolis Motta, PT (r) Clotilde Naqvi, PT Student (t) Lolis Motta PT (c)     Gait Assessment/Treatment    Gait, [place] Level minimum assist (75% patient effort);1 person + 1 person to manage equipment;verbal cues required  -SJ  minimum assist (75% patient effort);1 person + 1 person to manage equipment;verbal cues required  -DELORES,NEISHA,JB2    Gait, Assistive Device rolling walker  -SJ  rolling walker  -DELORES,KR,JB2    Gait, Distance (Feet) 250  -SJ  400  -DELORES,NEISHA,JB2    Gait, Gait Pattern Analysis swing-through gait  -SJ  swing-through gait  -DELORES,NEISHA,JB2    Gait, Gait Deviations lin decreased;decreased heel strike;double stance time increased;forward flexed posture  -SJ  lin decreased;forward flexed posture;step length decreased;weight-shifting ability decreased  -DELORES,KR,JB2    Gait, Safety Issues step length decreased;balance decreased during turns  -SJ  step length decreased   keeps RW too far in front  -DELORES,KR,JB2    Gait, Impairments strength decreased;impaired balance;coordination impaired  -SJ  strength decreased;impaired balance;coordination impaired  -DELORES,KR,JB2    Gait, Comment Pt required [place] for RW to steer straight and [place] to correct during LOB.   -SJ  Pt requires physical assistance to steer RW; requires VC's to keep walker close.  -DELORES,KR,JB2    Recorded by [SJ] Valentina Corona, PT  [DELORES,KR,JB2] Lolis oMtta, PT (r) Clotilde Naqvi PT Student (t) Lolis Motta, PT (c)    Functional Mobility    Functional Mobility- Ind. Level  minimum assist (75% patient effort);2 person assist required;verbal cues required  -CL     Functional Mobility- Device  rolling walker  -CL     Functional Mobility-Distance (Feet)  250  -CL     Functional Mobility- Comment  Pt required constant assist for RW management d/t L lateral drifting and pt keeping RW too far in front. Pt will impulsively let go of RW and demo episodes of LOB.   -CL     Recorded by  [CL] Mary Kate Boyle OT     Lower Body Dressing Assessment/Training    LB Dressing Assess/Train, Clothing Type  donning:;socks  -CL     LB  Dressing Assess/Train, [place]  verbal cues required  -CL     LB Dressing Assess/Train, Comment  Pt attempted to bend forward to don socks while in long-sitting in bed. Pt educated that this is not safe per spinal precautions, educated on appropriate technique though pt declined to perform.    -CL     Recorded by  [CL] Mary Kate Boyle OT     Toileting Assessment/Training    Toileting Assess/Train, Position  sitting  -CL     Toileting Assess/Train, [place]ep[place] Level  set up required;verbal cues required  -CL     Toileting Assess/Train, Comment  Pt required VCs for attention to task, though required no assist.   -CL     Recorded by  [CL] Mary Kate Boyle OT     Grooming Assessment/Training    Grooming Assess/Train, Position  sitting  -CL     Grooming Assess/Train, Indepen Level  set up required  -CL     Grooming Assess/Train, Comment  Pt washed face w/ wipe.   -CL     Recorded by  [CL] Mary Kate Boyle OT     Motor Skills/Interventions    Additional Documentation  Balance Skills Training (Group)  -CL Balance Skills Training (Group)  -DELORES,NEISHA,JB2    Recorded by  [CL] Mary Kate Boyle OT [DELORES,KR,JB2] Lolis Motta, PT (r) Clotilde Naqvi PT Student (t) Lolis Motta, PT (c)    Balance Skills Training    Sitting-Level of Assistance Close supervision  -SJ Close supervision  -CL Contact guard  -NEISHA ESTRELLA,JB2    Sitting-Balance Support Right upper extremity supported;Left upper extremity supported  -SJ Right upper extremity supported;Left upper extremity supported;Feet supported  -CL Right upper extremity supported;Left upper extremity supported  -DELORES,KR,JB2    Sitting-Balance Activities  Forward lean;Reaching for objects;Trunk control activities  -CL     Standing-Level of Assistance Contact guard  -SJ Contact guard  -CL Contact guard  -DELORES,NEISHA,JB2    Static Standing Balance Support assistive device  -SJ assistive device  -CL assistive device  -DELORES,KR,JB2    Standing-Balance Activities  Weight Shift A-P;Weight Shift R-L  -CL     Gait  Balance-Level of Assistance Minimum assistance  -SJ Minimum assistance;x2  -CL     Gait Balance Support assistive device  -SJ assistive device  -CL     Gait Balance Activities  scanning environment R/L;side-stepping  -CL     Recorded by [SJ] Valentina Corona, PT [CL] Mary Kate Boyle, OT [DELORES,KR,JB2] Lolis Motta, PT (r) Clotilde Naqvi, PT Student (t) Lolis Motta, PT (c)    Positioning and Restraints    Pre-Treatment Position in bed  -SJ in bed  -CL sitting in chair/recliner  -DELORES,KR,JB2    Post Treatment Position chair  -SJ chair  -CL bed  -DELORES,KR,JB2    In Bed   notified nsg;supine;call light within reach;encouraged to call for assist;exit alarm on;side rails up x2  -DELORES,KR,JB2    In Chair notified nsg;reclined;call light within reach;encouraged to call for assist;exit alarm on;heels elevated  -SJ notified nsg;reclined;call light within reach;encouraged to call for assist;exit alarm on;waffle cushion;legs elevated;heels elevated  -CL     Recorded by [SJ] Valentina Corona, PT [CL] Mary Kate Boyle, OT [DELORES,KR,JB2] Lolis Motta, PT (r) Clotilde Naqvi, PT Student (t) Lolis Motta, PT (c)      User Key  (r) = Recorded By, (t) = Taken By, (c) = Cosigned By    Initials Name Effective Dates    DELORES Motta, PT 06/19/15 -     SJ Valentina Corona, PT 06/19/15 -     CL Mary Kate Boyle, OT 06/08/16 -     NEISHA Naqvi, PT Student 05/30/17 -                 OT Goals       08/18/17 1521 08/16/17 1547       Transfer Training OT LTG    Transfer Training OT LTG, Date Established  08/16/17  -CL     Transfer Training OT LTG, Time to Achieve  by discharge  -CL     Transfer Training OT LTG, Activity Type  bed to chair /chair to bed;toilet;sit to stand/stand to sit  -CL     Transfer Training OT LTG, [place] Level  contact guard assist  -CL     Transfer Training OT LTG, Additional Goal  AAD  -CL     Transfer Training OT LTG, Outcome goal ongoing  -CL      Patient Education OT LTG    Patient Education OT LTG, Date  Established  08/16/17  -CL     Patient Education OT LTG, Time to Achieve  by discharge  -CL     Patient Education OT LTG, Education Type  written program;HEP;posture/body mechanics;home safety;adaptive equipment mgmt;energy conservation  -CL     Patient Education OT LTG, Education Understanding  verbalizes understanding  -CL     Patient Education OT LTG, Additional Goal  Pt will accurately recall spinal precautions.   -CL     Patient Education OT LTG Outcome goal ongoing  -CL      Orientation OT LTG    Orientation OT LTG, Date Established  08/16/17  -CL     Orientation OT LTG, Time to Achieve  by discharge  -CL     Orientation OT LTG, Activity Type  person;place;time;100% treatment session  -CL     Orientation OT LTG, Outcome goal met  -CL      LB Dressing OT LTG    LB Dressing Goal OT LTG, Date Established  08/16/17  -CL     LB Dressing Goal OT LTG, Time to Achieve  by discharge  -CL     LB Dressing Goal OT LTG, Activity Type  Don socks/pants per spinal precautions  -CL     LB Dressing Goal OT LTG, [place] Level  contact guard assist  -CL     LB Dressing Goal OT LTG, Additional Goal  AAD  -CL     LB Dressing Goal OT LTG, Outcome goal ongoing  -CL        User Key  (r) = Recorded By, (t) = Taken By, (c) = Cosigned By    Initials Name Provider Type    MANJEET Boyle OT Occupational Therapist          Occupational Therapy Education     Title: PT OT SLP Therapies (Active)     Topic: Occupational Therapy (Active)     Point: ADL training (Active)    Description: Instruct learner(s) on proper safety adaptation and remediation techniques during self care or transfers.   Instruct in proper use of assistive devices.    Learning Progress Summary    Learner Readiness Method Response Comment Documented by Status   Patient Acceptance E,D NR Pt educated on appropriate safety precautions, t/f techniques, spinal precautions, adaptive dressing techniques, and benefits of therapy.  08/18/17 1521 Active    Acceptance E,D NR Pt  educated on appropriate safety precautions, spinal precautions, t/f techniques, log roll technique, and benefits of therapy.  08/16/17 1546 Active               Point: Precautions (Active)    Description: Instruct learner(s) on prescribed precautions during self-care and functional transfers.    Learning Progress Summary    Learner Readiness Method Response Comment Documented by Status   Patient Acceptance E,D NR Pt educated on appropriate safety precautions, t/f techniques, spinal precautions, adaptive dressing techniques, and benefits of therapy.  08/18/17 1521 Active    Acceptance E,D NR Pt educated on appropriate safety precautions, spinal precautions, t/f techniques, log roll technique, and benefits of therapy.  08/16/17 1546 Active               Point: Body mechanics (Active)    Description: Instruct learner(s) on proper positioning and spine alignment during self-care, functional mobility activities and/or exercises.    Learning Progress Summary    Learner Readiness Method Response Comment Documented by Status   Patient Acceptance E,D NR Pt educated on appropriate safety precautions, t/f techniques, spinal precautions, adaptive dressing techniques, and benefits of therapy.  08/18/17 1521 Active    Acceptance E,D NR Pt educated on appropriate safety precautions, spinal precautions, t/f techniques, log roll technique, and benefits of therapy.  08/16/17 1546 Active                      User Key     Initials Effective Dates Name Provider Type Discipline     06/08/16 -  Mary Kate Boyle OT Occupational Therapist OT                  OT Recommendation and Plan  Anticipated Equipment Needs At Discharge:  (TBA further)  Anticipated Discharge Disposition: inpatient rehabilitation facility  Planned Therapy Interventions: adaptive equipment training, ADL retraining, balance training, energy conservation, bed mobility training, home exercise program, transfer training  Therapy Frequency: daily  Plan of Care  Review  Plan Of Care Reviewed With: patient  Progress: improving  Outcome Summary/Follow up Plan: Pt demo improved independence w/ t/fs of CGAx2 for STS, however pt Min Ax2 to ambulate 250 ft / RW d/t poor safety awareness. Pt could not recall spinal precautions and attempted to break them by bending over and donning socks, declined to don socks per spinal precautions. Recommend cont skilled IPOT POC. Recommend pt DC to IP rehab.         Outcome Measures       08/18/17 1432 08/18/17 1430 08/17/17 1124    How much help from another person do you currently need...    Turning from your back to your side while in flat bed without using bedrails? 4  -SJ  3  -DELORES (r) KR (t) DELORES (c)    Moving from lying on back to sitting on the side of a flat bed without bedrails? 4  -SJ  3  -DELORES (r) KR (t) DELORES (c)    Moving to and from a bed to a chair (including a wheelchair)? 3  -SJ  3  -DELORES (r) KR (t) DELORES (c)    Standing up from a chair using your arms (e.g., wheelchair, bedside chair)? 3  -SJ  3  -DELORES (r) KR (t) DELORES (c)    Climbing 3-5 steps with a railing? 3  -SJ  2  -DELORES (r) KR (t) DELORES (c)    To walk in hospital room? 3  -SJ  3  -DELORES (r) KR (t) DELORES (c)    AM-PAC 6 Clicks Score 20  -SJ  17  -DELORES (r) KR (t)    How much help from another is currently needed...    Putting on and taking off regular lower body clothing?  2  -CL     Bathing (including washing, rinsing, and drying)  2  -CL     Toileting (which includes using toilet bed pan or urinal)  4  -CL     Putting on and taking off regular upper body clothing  3  -CL     Taking care of personal grooming (such as brushing teeth)  3  -CL     Eating meals  4  -CL     Score  18  -CL     Functional Assessment    Outcome Measure Options AM-PAC 6 Clicks Basic Mobility (PT)  -SJ AM-PAC 6 Clicks Daily Activity (OT)  -CL AM-PAC 6 Clicks Basic Mobility (PT)  -DELORES (r) KR (t) DELORES (c)      08/16/17 3738          How much help from another is currently needed...    Putting on and taking off regular lower body  clothing? 2  -CL      Bathing (including washing, rinsing, and drying) 2  -CL      Toileting (which includes using toilet bed pan or urinal) 2  -CL      Putting on and taking off regular upper body clothing 3  -CL      Taking care of personal grooming (such as brushing teeth) 3  -CL      Eating meals 4  -CL      Score 16  -CL      Functional Assessment    Outcome Measure Options AM-PAC 6 Clicks Daily Activity (OT)  -CL        User Key  (r) = Recorded By, (t) = Taken By, (c) = Cosigned By    Initials Name Provider Type    DELORES Lolis Motta, PT Physical Therapist    SJ Valentina Corona, PT Physical Therapist    CL Mary Kate Boyle, OT Occupational Therapist    KR Clotilde Naqvi, PT Student PT Student           Time Calculation:         Time Calculation- OT       08/18/17 1524          Time Calculation- OT    OT Start Time 1430  -CL      Total Timed Code Minutes- OT 15 minute(s)  -CL      OT Received On 08/18/17  -CL      OT Goal Re-Cert Due Date 08/26/17  -CL        User Key  (r) = Recorded By, (t) = Taken By, (c) = Cosigned By    Initials Name Provider Type    CL Mary Kate Boyle OT Occupational Therapist           Therapy Charges for Today     Code Description Service Date Service Provider Modifiers Qty    [id number]  OT THERAPEUTIC ACT EA 15 MIN 8/18/2017 Mary Kate Boyle OT GO 1               Mary Kate Boyle OT  8/18/2017

## 2017-08-18 NOTE — PROGRESS NOTES
"Critical Care Note     LOS: 3 days   Patient Care Team:  Hernan Thompson MD as PCP - General (Internal Medicine)  Hernan Thompson MD as Referring Physician (Internal Medicine)    Chief Complaint/Reason for visit:    Chief Complaint   Patient presents with   • Hyperglycemia   DKA    Subjective   71-year-old woman with diabetes, hypothyroidism, hypertension who presented to the emergency room with a blood sugar of 940. She reports persistent nausea and vomiting with every meal for the last 2 weeks. She has lost 40 pounds in the last 3 months. She had back surgery in July for disc disease and still has sciatica.  Interval History:   She is tolerating a by mouth diet. Last night she was confused and tried to get up and slid down to the floor. She complained of left hip pain. X-ray was offered but she refused. No further nausea or vomiting. No shortness of air or chest pain.      Review of Systems:    All systems were reviewed and negative except as noted in subjective.    Medical history, surgical history, social history, family history reviewed    Objective     Intake/Output:    Intake/Output Summary (Last 24 hours) at 08/18/17 1424  Last data filed at 08/18/17 1300   Gross per 24 hour   Intake              780 ml   Output             1200 ml   Net             -420 ml       Nutrition:Diet Regular; Consistent Carbohydrate    Diet Regular; Consistent Carbohydrate  Infusions:    lactated ringers with KCl 20 mEq/L 75 mL/hr Last Rate: 75 mL/hr (08/16/17 1110)       Mechanical Ventilator Settings:    RA  Telemetry:  Sinus Rhythm: normal sinus rhythm          Vital Signs  Blood pressure 139/71, pulse 100, temperature 98.1 °F (36.7 °C), temperature source Axillary, resp. rate 18, height 62\" (157.5 cm), weight 134 lb 11.2 oz (61.1 kg), SpO2 97 %.    Physical Exam:  General Appearance:  Well-developed older white woman in no distress    Head:  Normocephalic, atraumatic    Eyes:          Pupils equal and reactive " to light, no jaundice    Ears:     Throat: Mucous membranes moist    Neck: Supple, trachea midline, no palpable thyroid    Back:      Lungs:   Symmetric chest excursion without wheeze or rhonchi     Heart:  Regular, normal S1, S2, no murmur    Abdomen:   Bowel sounds active, nondistended, soft    Rectal:   Deferred   Extremities: No pitting edema or cyanosis    Pulses: Pedal pulses present    Skin: Warm and dry    Lymph nodes: No cervical or supraclavicular adenopathy    Neurologic: Alert, oriented ×3, follows commands with all extremities. No weakness of her lower extremities.       Results Review:     I reviewed the patient's new clinical results.     Results from last 7 days  Lab Units 08/18/17  0401 08/17/17  0333 08/16/17  0623 08/16/17  0326  08/15/17  1712 08/15/17  1023   SODIUM mmol/L 136 133 134 133  < > 118* 122*   POTASSIUM mmol/L 4.3 3.9 4.2 4.2  < > 5.9* 5.7*   CHLORIDE mmol/L 105 103 102 100  < > 79* 81*   CO2 mmol/L 24.0 23.0 22.0 22.0  < > 10.0* 12.0*   BUN mg/dL 12 16 28* 29*  < > 47* 45*   CREATININE mg/dL 1.10 1.20 1.60* 1.60*  < > 2.70* 2.30*   CALCIUM mg/dL 8.7 8.9 8.9 8.9  < > 9.4 9.2   BILIRUBIN mg/dL  --   --   --  0.1*  --  0.1* 0.2*   ALK PHOS U/L  --   --   --  103*  --  148* 141*   ALT (SGPT) U/L  --   --   --  14  --  17 14   AST (SGOT) U/L  --   --   --  17  --  15 17   GLUCOSE mg/dL 118* 36* 149* 181*  < > 1047* 942*   < > = values in this interval not displayed.    Results from last 7 days  Lab Units 08/18/17  0401 08/17/17  0333 08/16/17  1903 08/16/17  0326 08/15/17  1712   WBC 10*3/mm3 6.84 11.72*  --  11.51* 13.03*   HEMOGLOBIN g/dL 8.9* 9.0* 8.9* 6.9* 8.4*   HEMATOCRIT % 27.7* 27.6* 27.2* 21.3* 29.3*   PLATELETS 10*3/mm3 483* 538*  --  628* 793*   MONOCYTES % %  --   --   --   --  2.0         Lab Results   Component Value Date    BLOODCX No growth at 2 days 08/15/2017    BLOODCX No growth at 2 days 08/15/2017     Lab Results   Component Value Date    URINECX 40,000-50,000 CFU/mL  Gram Negative Bacilli (A) 08/17/2017       I reviewed the patient's new imaging including images and reports.    IMPRESSION:  Some improvement seen in the appearance of the perihilar  regions bilaterally. Prominence remains. Bronchial cuffing suggesting  possibly a viral bronchiolitis or perihilar infiltrate. Continued  follow-up is recommended to resolution.      D:  08/16/2017  IMPRESSION:  Prolonged lag phase and then marked, rapid gastric emptying,  as noted above. T-1/2 of 39 minutes and total emptying of 95% at 90  minutes is well within expected limits.      DICTATED:     08/16/2017  All medications reviewed.     aspirin 81 mg Oral Daily   atorvastatin 10 mg Oral Nightly   clopidogrel 75 mg Oral Daily   DULoxetine 30 mg Oral Daily   famotidine 20 mg Oral BID   heparin (porcine) 5,000 Units Subcutaneous Q8H   insulin detemir 10 Units Subcutaneous Q12H   insulin lispro 0-9 Units Subcutaneous 4x Daily AC & at Bedtime   insulin lispro 4 Units Subcutaneous TID With Meals   levothyroxine 50 mcg Oral Daily   raloxifene 60 mg Oral Daily   valsartan 160 mg Oral Daily         Assessment/Plan     Principal Problem:    Type 2 diabetes mellitus with ketoacidosis without coma  Active Problems:    Acute on chronic kidney failure    S/P right L4-5 laminotomy with medial facetectomy, foraminotomy and L4-5 diskectomy    Hypothyroid    Non-intractable vomiting with nausea    HTN (hypertension)    Hyperlipidemia    H/O: stroke      DKA, in type 2 DM  blood sugar has improved and anion gap now normal. Poorly controlled diabetes with a1c 11. She reports that she checks her sugar twice daily but I think this is unlikely. Insulin drip was discontinued and long-acting with sliding scale arranged. Blood sugar Increased to 400 today    Acute on chronic kidney disease baseline serum creatinine is 1.8-1.9, admission was 2.3. Current creatinine is 1.1. Admission urine was negative for protein. Serum creatinine has actually normalized.  Without proteinuria, I think chronic kidney disease is unlikely.    Chronic nausea and vomiting she could have gastroparesis from her diabetes. She was not taking narcotics at home. Her gastric emptying scan reveals a prolonged lag phase but then prompt and marked emptying. Perhaps it is from her poorly controlled blood sugars    Hypothyroid, TSH was 9 indicating inadequate replacement. Either she was not taking her 50 µg or she needs a higher dose    Hypertension, initially she was mildly hypotensive. However with hydration blood pressure is now 140/70. At home she was on Diovan and this was restarted      Acute on chronic anemia. In December 2016 her hematocrit was 28.5%, hemoglobin 9, 8/16 her hemoglobin is down to 6.9, and she received 1 unit. She clearly has a chronic anemia but also a decline compared to December however unchanged compared to July of this year.     Possible dementia, nursing reports that she will tell her restrictions concerning her activity because of recent back surgery. 2 hours later she cannot recall the restrictions and is not following recommendations. I am concerned that she will be unable to give herself medications on time at home. I am also concerned that she truly needs insulin for glucose control and that she will not be able to manage her injections and fingersticks and possible insulin reactions at home independently    PLAN:  Add meal coverage  Levemir 10 units Q12H  Increase sliding scale coverage  Plavix, aspirin, statin  Diovan for hypertension  Thyroid replacement  Stop IV fluids  Stool for occult blood   medically ready for telemetry but I am concerned about her fall risk        VTE Prophylaxis:SQ heparin    Stress Ulcer Prophylaxis:boris Beverly MD  08/18/17  2:24 PM      Time:30min  I personally provided care to this critically ill patient as documented above.  Critical care time does not include time spent on separately billed procedures.  Non of my  critical care time was concurrent with other critical care providers.

## 2017-08-18 NOTE — PLAN OF CARE
Problem: Patient Care Overview (Adult)  Goal: Plan of Care Review  Outcome: Ongoing (interventions implemented as appropriate)    08/18/17 0512   Coping/Psychosocial Response Interventions   Plan Of Care Reviewed With patient;spouse   Patient Care Overview   Progress no change   Outcome Evaluation   Outcome Summary/Follow up Plan patient seemed increasingly confused through the night, oriented only to person but this morning was oriented to place as well. Very high fall risk as she fell yesterday and does not follow instructions/forgetful when given task.          Problem: Diabetes, Type 2 (Adult)  Goal: Signs and Symptoms of Listed Potential Problems Will be Absent or Manageable (Diabetes, Type 2)  Outcome: Ongoing (interventions implemented as appropriate)    Problem: Fluid Volume Deficit (Adult)  Goal: Fluid/Electrolyte Balance  Outcome: Ongoing (interventions implemented as appropriate)  Goal: Comfort/Well Being  Outcome: Ongoing (interventions implemented as appropriate)    Problem: Pressure Ulcer Risk (Ivan Scale) (Adult,Obstetrics,Pediatric)  Goal: Skin Integrity  Outcome: Ongoing (interventions implemented as appropriate)    Problem: Fall Risk (Adult)  Goal: Absence of Falls  Outcome: Ongoing (interventions implemented as appropriate)

## 2017-08-18 NOTE — PLAN OF CARE
Problem: Patient Care Overview (Adult)  Goal: Plan of Care Review  Outcome: Ongoing (interventions implemented as appropriate)    08/18/17 1521   Coping/Psychosocial Response Interventions   Plan Of Care Reviewed With patient   Patient Care Overview   Progress improving   Outcome Evaluation   Outcome Summary/Follow up Plan Pt demo improved independence w/ t/fs of CGAx2 for STS, however pt Min Ax2 to ambulate 250 ft / RW d/t poor safety awareness. Pt could not recall spinal precautions and attempted to break them by bending over and donning socks, declined to don socks per spinal precautions. Recommend cont skilled IPOT POC. Recommend pt DC to IP rehab.          Problem: Inpatient Occupational Therapy  Goal: Transfer Training Goal 1 LTG- OT  Outcome: Ongoing (interventions implemented as appropriate)    08/16/17 1547 08/18/17 1521   Transfer Training OT LTG   Transfer Training OT LTG, Date Established 08/16/17 --    Transfer Training OT LTG, Time to Achieve by discharge --    Transfer Training OT LTG, Activity Type bed to chair /chair to bed;toilet;sit to stand/stand to sit --    Transfer Training OT LTG, [place] Level contact guard assist --    Transfer Training OT LTG, Additional Goal AAD --    Transfer Training OT LTG, Outcome --  goal ongoing       Goal: Patient Education Goal LTG- OT  Outcome: Ongoing (interventions implemented as appropriate)    08/16/17 1547 08/18/17 1521   Patient Education OT LTG   Patient Education OT LTG, Date Established 08/16/17 --    Patient Education OT LTG, Time to Achieve by discharge --    Patient Education OT LTG, Education Type written program;HEP;posture/body mechanics;home safety;adaptive equipment mgmt;energy conservation --    Patient Education OT LTG, Education Understanding verbalizes understanding --    Patient Education OT LTG, Additional Goal Pt will accurately recall spinal precautions.  --    Patient Education OT LTG Outcome --  goal ongoing       Goal: Orientation  Goal LTG- OT  Outcome: Outcome(s) achieved Date Met:  08/18/17 08/16/17 1547 08/18/17 1521   Orientation OT LTG   Orientation OT LTG, Date Established 08/16/17 --    Orientation OT LTG, Time to Achieve by discharge --    Orientation OT LTG, Activity Type person;place;time;100% treatment session --    Orientation OT LTG, Outcome --  goal met       Goal: LB Dressing Goal LTG- OT  Outcome: Ongoing (interventions implemented as appropriate)    08/16/17 1547 08/18/17 1521   LB Dressing OT LTG   LB Dressing Goal OT LTG, Date Established 08/16/17 --    LB Dressing Goal OT LTG, Time to Achieve by discharge --    LB Dressing Goal OT LTG, Activity Type Don socks/pants per spinal precautions --    LB Dressing Goal OT LTG, [place] Level contact guard assist --    LB Dressing Goal OT LTG, Additional Goal AAD --    LB Dressing Goal OT LTG, Outcome --  goal ongoing

## 2017-08-18 NOTE — PROGRESS NOTES
Adult Nutrition  Assessment/PES    Patient Name:  Paulina Johnson  YOB: 1945  MRN: [id number]  Admit Date:  8/15/2017    Assessment Date:  8/18/2017        Reason for Assessment       08/18/17 1045    Reason for Assessment    Reason For Assessment/Visit multidisciplinary rounds    Time Spent (min) 15                        Nutrition Prescription Ordered       08/18/17 1046    Nutrition Prescription PO    Current PO Diet Regular    Supplement Boost Glucose Control    Supplement Frequency 2 times a day;Snack time- HS    Snack Times Bedtime    Common Modifiers Consistent Carbohydrate                Nutrition Intervention       08/18/17 1047    Nutrition Intervention    RD/Tech Action Follow Tx progress;Care plan reviewd              Education/Evaluation       08/18/17 1047    Monitor/Evaluation    Monitor Per protocol          Electronically signed by:  Maddie Macedo RD  08/18/17 10:47 AM

## 2017-08-18 NOTE — CONSULTS
Diabetes education consult noted. Patient was seen yesterday for education, please see note. Please re consult if needed. Thank you.

## 2017-08-18 NOTE — THERAPY TREATMENT NOTE
Acute Care - Physical Therapy Treatment Note  [facility]     Patient Name: Paulina Johnson  : 1945  MRN: [id number]  Today's Date: 2017  Onset of Illness/Injury or Date of Surgery Date: 08/15/17  Date of Referral to PT: 08/15/17  Referring Physician: MD Ishan    Admit Date: 8/15/2017    Visit Dx:    ICD-10-CM ICD-9-CM   1. Type 2 diabetes mellitus with ketoacidosis without coma, unspecified long term insulin use status E13.10 250.12   2. Nausea and vomiting, intractability of vomiting not specified, unspecified vomiting type R11.2 787.01   3. Confusion R41.0 298.9   4. PANFILO (acute kidney injury) N17.9 584.9   5. Impaired mobility and ADLs Z74.09 799.89   6. Impaired functional mobility, balance, gait, and endurance Z74.09 V49.89     Patient Active Problem List   Diagnosis   • S/P right L4-5 laminotomy with medial facetectomy, foraminotomy and L4-5 diskectomy   • Hypothyroid   • HTN (hypertension)   • Hyperlipidemia   • H/O: stroke   • Right leg pain   • Status post lumbar spinal fusion   • Lumbar stenosis with neurogenic claudication   • Recurrent herniation of lumbar disc   • status post dural rent   • Type 2 diabetes mellitus with ketoacidosis without coma   • Acute on chronic kidney failure   • Non-intractable vomiting with nausea               Adult Rehabilitation Note       17 1432 17 1430 17 1124    Rehab Assessment/Intervention    Discipline physical therapist  -SJ occupational therapist  -CL physical therapist  -NEISHA ESTRELLA JB2    Document Type  therapy note (daily note)  -CL therapy note (daily note)  -NEISHA ESTRELLA JB2    Subjective Information no complaints;agree to therapy  -SJ agree to therapy;no complaints  -CL agree to therapy;no complaints  -NEISHA ESTRELLA JB2    Patient Effort, Rehab Treatment good  -SJ good  -CL good  -NEISHA ESTRELLA JB2    Symptoms Noted During/After Treatment none  -SJ  fatigue  -NEISHA ESTRELLA JB2    Precautions/Limitations fall precautions;spinal precautions   lumbar spinal  precautions  -SJ fall precautions;spinal precautions;other (see comments)   s/p TLIF on 07/05/17, exit alarm, residual R sided weakness  -CL fall precautions;spinal precautions  -DELORES,KR,JB2    Recorded by [SJ] Valentina Corona, PT [CL] Mary Kate Boyle OT [DELORES,KR,JB2] Lolis Motta, PT (r) Clotilde Naqvi, PT Student (t) Lolis Motta, PT (c)    Vital Signs    Pre Systolic BP Rehab  154  -  -DELORES,KR,JB2    Pre Treatment Diastolic BP  52  -CL 82  -DELORES,KR,JB2    Post Systolic BP Rehab 150  -  -  -DELORES,KR,JB2    Post Treatment Diastolic BP 88  -SJ 88  -CL 82  -DELORES,KR,JB2    Pretreatment Heart Rate (beats/min) 95  -SJ 94  -CL 84  -DELORES,KR,JB2    Posttreatment Heart Rate (beats/min) 101  -SJ 99  -CL 93  -DELORES,KR,JB2    Pre SpO2 (%)  98  -CL 98  -DELORES,KR,JB2    O2 Delivery Pre Treatment  room air  -CL room air  -DELORES,KR,JB2    Post SpO2 (%) 100  -SJ 98  -CL 93  -DELORES,KR,JB2    O2 Delivery Post Treatment room air  -SJ room air  -CL room air  -DELORES,KR,JB2    Pre Patient Position  Supine  -CL Sitting  -DELORES,KR,JB2    Intra Patient Position  Standing  -CL Standing  -DELORES,KR,JB2    Post Patient Position  Sitting  -CL Supine  -DELORES,KR,JB2    Recorded by [SJ] Valentina Corona, PT [CL] Mary Kate Boyle, OT [DELORES,KR,JB2] Lolis Motta, PT (r) Clotilde Naqvi, PT Student (t) Lolis Motta, PT (c)    Pain Assessment    Pain Assessment No/denies pain  -SJ No/denies pain  -CL 0-10  -DELORES,KR,JB2    Pain Score 0  -SJ  7  -DELORES,KR,JB2    Post Pain Score 0  -SJ  7  -DELORES,KR,JB2    Pain Type   Acute pain  -DELORES,KR,JB2    Pain Location   Back  -DELORES,KR,JB2    Pain Intervention(s)   Repositioned;Ambulation/increased activity  -DELORES,KR,JB2    Response to Interventions   tolerated  -DELORES,KR,JB2    Recorded by [SJ] Valentina Corona, PT [CL] Mary Kate Boyle OT [DELORES,KR,JB2] Lolis Motta, PT (r) Clotilde Naqvi PT Student (t) Lolis Motta, PT (c)    Cognitive Assessment/Intervention    Current Cognitive/Communication Assessment impaired  -SJ impaired  -CL  impaired  -DELORES,NEISHA,JB2    Orientation Status oriented to;person  -SJ oriented x 4  -CL oriented to;person  -DELORES,NEISHA,JB2    Follows Commands/Answers Questions 75% of the time;able to follow single-step instructions;needs cueing  -SJ 75% of the time;needs cueing;needs increased time;needs repetition  -CL able to follow single-step instructions;75% of the time;needs cueing;needs repetition  -NEISHA ESTRELLA,JB2    Personal Safety unaware of cognitive deficits;decreased awareness, need for safety;decreased insight to deficits  -SJ moderate impairment;decreased awareness, need for assist;decreased awareness, need for safety;decreased insight to deficits;severe impairment;impulsive   Pt could not recall spinal precautions, tried to bend/twist  -CL moderate impairment;decreased awareness, need for assist;decreased awareness, need for safety  -NEISHA ESTRELLA,DELORES2    Personal Safety Interventions fall prevention program maintained;gait belt;muscle strengthening facilitated;nonskid shoes/slippers when out of bed  -SJ gait belt;fall prevention program maintained;nonskid shoes/slippers when out of bed  -CL fall prevention program maintained;gait belt;nonskid shoes/slippers when out of bed  -DELORES,NEISHA,DELORES2    Recorded by [SJ] Valentina Corona, PT [CL] Mary Kate Boyle OT [DELORES,NEISHA,JB2] Lolis Motta, PT (r) Clotilde Naqvi PT Student (t) Lolis Motta, PT (c)    Bed Mobility, Assessment/Treatment    Bed Mob, Supine to Sit, [place] conditional independence  -SJ supervision required;verbal cues required  -CL not tested  -NEISHA ESTRELLA,JB2    Bed Mob, Sit to Supine, [place]  not tested  -CL minimum assist (75% patient effort);verbal cues required  -DELORES,NEISHA,JB2    Bed Mobility, Safety Issues   decreased use of arms for pushing/pulling;decreased use of legs for bridging/pushing;cognitive deficits limit understanding  -NEISHA ESTRELLA,JB2    Bed Mobility, Impairments   strength decreased;impaired balance  -NEISHA ESTRELLA,JB2    Bed Mobility, Comment Pt impulsively sat up without  concern for spinal precautions.   -SJ Pt sat up into long-sitting impulsively in bed prior to cueing for log-roll technique, however required no assist.   -CL VC's for sequencing to maintain spinal precautions.  -DELORES,KR,JB2    Recorded by [SJ] Valentina Corona PT [CL] Mary Kate Boyle OT [DELORES,KR,JB2] Lolis Motta, PT (r) Clotilde Naqvi, PT Student (t) Lolis Motta, PT (c)    Transfer Assessment/Treatment    Transfers, Sit-Stand [place] contact guard assist;2 person assist required;verbal cues required  -SJ contact guard assist;2 person assist required;verbal cues required  -CL minimum assist (75% patient effort);verbal cues required  -DELORES,KR,JB2    Transfers, Stand-Sit [place] contact guard assist;verbal cues required  -SJ contact guard assist;2 person assist required;verbal cues required  -CL minimum assist (75% patient effort);verbal cues required  -DELORES,KR,JB2    Transfers, Sit-Stand-Sit, Assist Device rolling walker  -SJ rolling walker  -CL rolling walker  -DELORES,KR,JB2    Toilet Transfer, [place] contact guard assist  -SJ contact guard assist;2 person assist required;verbal cues required  -CL minimum assist (75% patient effort);verbal cues required  -DELORES,KR,JB2    Toilet Transfer, Assistive Device bedside commode without drop arms;rolling walker  -SJ rolling walker;bedside commode without drop arms  -CL rolling walker  -DELORES,KR,JB2    Transfer, Safety Issues weight-shifting ability decreased;impulsivity  -SJ  weight-shifting ability decreased;step length decreased  -DELORES,KR,JB2    Transfer, Impairments strength decreased;impaired balance  -SJ  strength decreased;impaired balance  -DELORES,KR,JB2    Transfer, Comment Decreased safety awareness, cues for hand placement  -SJ VCs for HP/sequencing.   -CL VC's for sequencing and safety awareness  -DELORES,KR,JB2    Recorded by [SJ] Valentina Corona, PT [CL] Mary Kate Boyle OT [DELORES,KR,JB2] Lolis Motta, PT (r) Clotilde Naqvi, PT Student (t) Lolis Motta PT (c)     Gait Assessment/Treatment    Gait, [place] Level minimum assist (75% patient effort);1 person + 1 person to manage equipment;verbal cues required  -SJ  minimum assist (75% patient effort);1 person + 1 person to manage equipment;verbal cues required  -DELORES,NEISHA,JB2    Gait, Assistive Device rolling walker  -SJ  rolling walker  -DELORES,KR,JB2    Gait, Distance (Feet) 250  -SJ  400  -DELORES,NEISHA,JB2    Gait, Gait Pattern Analysis swing-through gait  -SJ  swing-through gait  -DELORES,NEISHA,JB2    Gait, Gait Deviations lin decreased;decreased heel strike;double stance time increased;forward flexed posture  -SJ  lin decreased;forward flexed posture;step length decreased;weight-shifting ability decreased  -DELORES,KR,JB2    Gait, Safety Issues step length decreased;balance decreased during turns  -SJ  step length decreased   keeps RW too far in front  -DELORES,KR,JB2    Gait, Impairments strength decreased;impaired balance;coordination impaired  -SJ  strength decreased;impaired balance;coordination impaired  -DELORES,KR,JB2    Gait, Comment Pt required Jerome for RW to steer straight and Jerome to correct during LOB.   -SJ  Pt requires physical assistance to steer RW; requires VC's to keep walker close.  -DELORES,KR,JB2    Recorded by [SJ] Valentina Corona, PT  [DELORES,KR,JB2] Lolis Motta, PT (r) Clotilde Naqvi PT Student (t) Lolis Motta, PT (c)    Functional Mobility    Functional Mobility- Ind. Level  minimum assist (75% patient effort);2 person assist required;verbal cues required  -CL     Functional Mobility- Device  rolling walker  -CL     Functional Mobility-Distance (Feet)  250  -CL     Functional Mobility- Comment  Pt required constant assist for RW management d/t L lateral drifting and pt keeping RW too far in front. Pt will impulsively let go of RW and demo episodes of LOB.   -CL     Recorded by  [CL] Mary Kate Boyle OT     Lower Body Dressing Assessment/Training    LB Dressing Assess/Train, Clothing Type  donning:;socks  -CL     LB  Dressing Assess/Train, [place]  verbal cues required  -CL     LB Dressing Assess/Train, Comment  Pt attempted to bend forward to don socks while in long-sitting in bed. Pt educated that this is not safe per spinal precautions, educated on appropriate technique though pt declined to perform.    -CL     Recorded by  [CL] Mary Kate Boyle OT     Toileting Assessment/Training    Toileting Assess/Train, Position  sitting  -CL     Toileting Assess/Train, [place]ep[place] Level  set up required;verbal cues required  -CL     Toileting Assess/Train, Comment  Pt required VCs for attention to task, though required no assist.   -CL     Recorded by  [CL] Mary Kate Boyle OT     Grooming Assessment/Training    Grooming Assess/Train, Position  sitting  -CL     Grooming Assess/Train, Indepen Level  set up required  -CL     Grooming Assess/Train, Comment  Pt washed face w/ wipe.   -CL     Recorded by  [CL] Mary Kate Boyle OT     Motor Skills/Interventions    Additional Documentation  Balance Skills Training (Group)  -CL Balance Skills Training (Group)  -DELORES,NEISHA,JB2    Recorded by  [CL] Mary Kate Boyle OT [DELORES,KR,JB2] Lolis Motta, PT (r) Clotilde Naqvi PT Student (t) Lolis Motta, PT (c)    Balance Skills Training    Sitting-Level of Assistance Close supervision  -SJ Close supervision  -CL Contact guard  -NEISHA ESTRELLA,JB2    Sitting-Balance Support Right upper extremity supported;Left upper extremity supported  -SJ Right upper extremity supported;Left upper extremity supported;Feet supported  -CL Right upper extremity supported;Left upper extremity supported  -DELORES,KR,JB2    Sitting-Balance Activities  Forward lean;Reaching for objects;Trunk control activities  -CL     Standing-Level of Assistance Contact guard  -SJ Contact guard  -CL Contact guard  -DELORES,NEISHA,JB2    Static Standing Balance Support assistive device  -SJ assistive device  -CL assistive device  -DELORES,KR,JB2    Standing-Balance Activities  Weight Shift A-P;Weight Shift R-L  -CL     Gait  Balance-Level of Assistance Minimum assistance  -SJ Minimum assistance;x2  -CL     Gait Balance Support assistive device  -SJ assistive device  -CL     Gait Balance Activities  scanning environment R/L;side-stepping  -CL     Recorded by [SJ] Valentina Corona, PT [CL] Mary Kate Boyle, OT [DELORES,KR,JB2] Lolis Motta, PT (r) Clotilde Naqvi, PT Student (t) Lolis Motta, PT (c)    Positioning and Restraints    Pre-Treatment Position in bed  -SJ in bed  -CL sitting in chair/recliner  -DELORES,KR,JB2    Post Treatment Position chair  -SJ chair  -CL bed  -DELORES,KR,JB2    In Bed   notified nsg;supine;call light within reach;encouraged to call for assist;exit alarm on;side rails up x2  -DELORES,KR,JB2    In Chair notified nsg;reclined;call light within reach;encouraged to call for assist;exit alarm on;heels elevated  -SJ notified nsg;reclined;call light within reach;encouraged to call for assist;exit alarm on;waffle cushion;legs elevated;heels elevated  -CL     Recorded by [SJ] Valentina Corona, PT [CL] Mary Kate Boyle, OT [DELORES,KR,JB2] Lolis Motta, PT (r) Clotilde Naqvi, PT Student (t) Lolis Motta PT (c)      User Key  (r) = Recorded By, (t) = Taken By, (c) = Cosigned By    Initials Name Effective Dates    DELORES Lolis Motta, PT 06/19/15 -     SJ Valentina Corona, PT 06/19/15 -     CL Mary Kate Boyle, OT 06/08/16 -     NEISHA Naqvi, PT Student 05/30/17 -                 IP PT Goals       08/17/17 1318 08/16/17 1556       Bed Mobility PT LTG    Bed Mobility PT LTG, Date Established  08/16/17  -SJ     Bed Mobility PT LTG, Time to Achieve  2 wks  -SJ     Bed Mobility PT LTG, Activity Type  roll left/roll right;supine to sit/sit to supine  -SJ     Bed Mobility PT LTG, [place] Level  supervision required  -     Bed Mobility PT LTG, Outcome goal ongoing  -DELORES (r) NEISHA (t) DELORES (c) goal ongoing  -SJ     Transfer Training PT LTG    Transfer Training PT LTG, Date Established  08/16/17  -SJ     Transfer Training PT LTG, Time to Achieve   2 wks  -SJ     Transfer Training PT LTG, Activity Type  bed to chair /chair to bed;sit to stand/stand to sit  -SJ     Transfer Training PT LTG, [place] Level  supervision required  -SJ     Transfer Training PT LTG, Assist Device  walker, rolling  -SJ     Transfer Training PT LTG, Outcome goal ongoing  -DELORES (r) KR (t) DELORES (c) goal ongoing  -SJ     Gait Training PT LTG    Gait Training Goal PT LTG, Date Established  08/16/17  -SJ     Gait Training Goal PT LTG, Time to Achieve  2 wks  -SJ     Gait Training Goal PT LTG, [place] Level  contact guard assist  -SJ     Gait Training Goal PT LTG, Assist Device  walker, rolling  -SJ     Gait Training Goal PT LTG, Distance to Achieve  500  -SJ     Gait Training Goal PT LTG, Outcome goal ongoing  -DELORES (r) KR (t) DELORES (c) goal ongoing  -SJ       User Key  (r) = Recorded By, (t) = Taken By, (c) = Cosigned By    Initials Name Provider Type    DELORES oMtta, PT Physical Therapist    NISHANT Corona, PT Physical Therapist    NEISHA Naqvi, PT Student PT Student          Physical Therapy Education     Title: PT OT SLP Therapies (Active)     Topic: Physical Therapy (Active)     Point: Mobility training (Active)    Learning Progress Summary    Learner Readiness Method Response Comment Documented by Status   Patient Acceptance E,D NR   08/18/17 1522 Active    Acceptance E NR   08/17/17 1318 Active    Acceptance E NR   08/16/17 1559 Active               Point: Home exercise program (Active)    Learning Progress Summary    Learner Readiness Method Response Comment Documented by Status   Patient Acceptance E,D NR   08/18/17 1522 Active    Acceptance E NR   08/16/17 1559 Active               Point: Body mechanics (Active)    Learning Progress Summary    Learner Readiness Method Response Comment Documented by Status   Patient Acceptance E,D NR   08/18/17 1522 Active    Acceptance E NR  KR 08/17/17 1318 Active    Acceptance E NR   08/16/17 1559 Active                Point: Precautions (Active)    Learning Progress Summary    Learner Readiness Method Response Comment Documented by Status   Patient Acceptance E,D NR  SJ 08/18/17 1522 Active    Acceptance E NR  KR 08/17/17 1318 Active    Acceptance E NR  SJ 08/16/17 1559 Active                      User Key     Initials Effective Dates Name Provider Type Discipline     06/19/15 -  Valentina Corona, PT Physical Therapist PT    KR 05/30/17 -  Clotilde Naqvi, PT Student PT Student PT                    PT Recommendation and Plan  Anticipated Discharge Disposition: inpatient rehabilitation facility  Planned Therapy Interventions: balance training, bed mobility training, gait training, home exercise program, patient/family education, strengthening, transfer training  PT Frequency: daily  Plan of Care Review  Plan Of Care Reviewed With: patient  Progress: progress toward functional goals as expected  Outcome Summary/Follow up Plan: Pt amb 250ft with Rw with Jerome, several LOB requiring Jerome. Pt pleasantly confused, cooperative with POC.          Outcome Measures       08/18/17 1432 08/17/17 1124 08/16/17 1458    How much help from another person do you currently need...    Turning from your back to your side while in flat bed without using bedrails? 4  -SJ 3  -DELORES (r) KR (t) DELORES (c)     Moving from lying on back to sitting on the side of a flat bed without bedrails? 4  -SJ 3  -DELORES (r) KR (t) DELORES (c)     Moving to and from a bed to a chair (including a wheelchair)? 3  -SJ 3  -DELORES (r) KR (t) DELORES (c)     Standing up from a chair using your arms (e.g., wheelchair, bedside chair)? 3  -SJ 3  -DELORES (r) KR (t) DELORES (c)     Climbing 3-5 steps with a railing? 3  -SJ 2  -DELORES (r) KR (t) DELORES (c)     To walk in hospital room? 3  -SJ 3  -DELORES (r) KR (t) DELORES (c)     AM-PAC 6 Clicks Score 20  -SJ 17  -DELORES (r) KR (t)     How much help from another is currently needed...    Putting on and taking off regular lower body clothing?   2  -CL    Bathing (including  washing, rinsing, and drying)   2  -CL    Toileting (which includes using toilet bed pan or urinal)   2  -CL    Putting on and taking off regular upper body clothing   3  -CL    Taking care of personal grooming (such as brushing teeth)   3  -CL    Eating meals   4  -CL    Score   16  -CL    Functional Assessment    Outcome Measure Options AM-PAC 6 Clicks Basic Mobility (PT)  -SJ AM-PAC 6 Clicks Basic Mobility (PT)  -DELORES (r) KR (t) DELORES (c) AM-PAC 6 Clicks Daily Activity (OT)  -CL      User Key  (r) = Recorded By, (t) = Taken By, (c) = Cosigned By    Initials Name Provider Type    DELORES Lolis Motta, PT Physical Therapist    NISHANT Corona, PT Physical Therapist    CL Mary Kate Boyle, OT Occupational Therapist    KR Clotilde Naqvi, PT Student PT Student           Time Calculation:         PT Charges       08/18/17 1521          Time Calculation    Start Time 1432  -SJ      PT Received On 08/18/17  -      PT Goal Re-Cert Due Date 08/26/17  -      Time Calculation- PT    Total Timed Code Minutes- PT 16 minute(s)  -        User Key  (r) = Recorded By, (t) = Taken By, (c) = Cosigned By    Initials Name Provider Type    NISHANT Corona, PT Physical Therapist          Therapy Charges for Today     Code Description Service Date Service Provider Modifiers Qty    [id number] HC GAIT TRAINING EA 15 MIN 8/18/2017 Valentina Corona PT GP 1          PT G-Codes  Outcome Measure Options: AM-PAC 6 Clicks Basic Mobility (PT)    Valentina Corona PT  8/18/2017

## 2017-08-18 NOTE — PLAN OF CARE
Problem: Patient Care Overview (Adult)  Goal: Plan of Care Review  Outcome: Ongoing (interventions implemented as appropriate)    08/18/17 1519   Coping/Psychosocial Response Interventions   Plan Of Care Reviewed With patient   Patient Care Overview   Progress progress toward functional goals as expected   Outcome Evaluation   Outcome Summary/Follow up Plan Pt amb 250ft with Rw with Jerome, several LOB requiring Jerome. Pt pleasantly confused, cooperative with POC.         Problem: Inpatient Physical Therapy  Goal: Bed Mobility Goal LTG- PT  Outcome: Ongoing (interventions implemented as appropriate)    08/16/17 1556 08/17/17 1318   Bed Mobility PT LTG   Bed Mobility PT LTG, Date Established 08/16/17 --    Bed Mobility PT LTG, Time to Achieve 2 wks --    Bed Mobility PT LTG, Activity Type roll left/roll right;supine to sit/sit to supine --    Bed Mobility PT LTG, [place] Level supervision required --    Bed Mobility PT LTG, Outcome --  goal ongoing       Goal: Transfer Training Goal 1 LTG- PT  Outcome: Ongoing (interventions implemented as appropriate)    08/16/17 1556 08/17/17 1318   Transfer Training PT LTG   Transfer Training PT LTG, Date Established 08/16/17 --    Transfer Training PT LTG, Time to Achieve 2 wks --    Transfer Training PT LTG, Activity Type bed to chair /chair to bed;sit to stand/stand to sit --    Transfer Training PT LTG, [place] Level supervision required --    Transfer Training PT LTG, Assist Device walker, rolling --    Transfer Training PT LTG, Outcome --  goal ongoing       Goal: Gait Training Goal LTG- PT  Outcome: Ongoing (interventions implemented as appropriate)    08/16/17 1556 08/17/17 1318   Gait Training PT LTG   Gait Training Goal PT LTG, Date Established 08/16/17 --    Gait Training Goal PT LTG, Time to Achieve 2 wks --    Gait Training Goal PT LTG, [place] Level contact guard assist --    Gait Training Goal PT LTG, Assist Device walker, rolling --    Gait Training  Goal PT LTG, Distance to Achieve 500 --    Gait Training Goal PT LTG, Outcome --  goal ongoing

## 2017-08-19 LAB
ABO GROUP BLD: NORMAL
BACTERIA SPEC AEROBE CULT: ABNORMAL
BLD GP AB SCN SERPL QL: NEGATIVE
FERRITIN SERPL-MCNC: 42 NG/ML (ref 10–291)
FOLATE SERPL-MCNC: 15.83 NG/ML (ref 3.2–20)
GLUCOSE BLDC GLUCOMTR-MCNC: 129 MG/DL (ref 70–130)
GLUCOSE BLDC GLUCOMTR-MCNC: 210 MG/DL (ref 70–130)
GLUCOSE BLDC GLUCOMTR-MCNC: 231 MG/DL (ref 70–130)
GLUCOSE BLDC GLUCOMTR-MCNC: 245 MG/DL (ref 70–130)
HEMOCCULT STL QL: NEGATIVE
IRON 24H UR-MRATE: 25 MCG/DL (ref 50–175)
IRON SATN MFR SERPL: 9 % (ref 15–50)
RETICS/RBC NFR AUTO: 2.09 % (ref 0.5–1.5)
RH BLD: POSITIVE
TIBC SERPL-MCNC: 271 MCG/DL (ref 250–450)
VIT B12 BLD-MCNC: 322 PG/ML (ref 211–911)

## 2017-08-19 PROCEDURE — 63710000001 INSULIN LISPRO (HUMAN) PER 5 UNITS: Performed by: INTERNAL MEDICINE

## 2017-08-19 PROCEDURE — 82728 ASSAY OF FERRITIN: CPT | Performed by: HOSPITALIST

## 2017-08-19 PROCEDURE — 99233 SBSQ HOSP IP/OBS HIGH 50: CPT | Performed by: HOSPITALIST

## 2017-08-19 PROCEDURE — 63710000001 INSULIN DETEMIR PER 5 UNITS: Performed by: INTERNAL MEDICINE

## 2017-08-19 PROCEDURE — 82962 GLUCOSE BLOOD TEST: CPT

## 2017-08-19 PROCEDURE — 83550 IRON BINDING TEST: CPT | Performed by: HOSPITALIST

## 2017-08-19 PROCEDURE — 25010000002 LORAZEPAM PER 2 MG: Performed by: INTERNAL MEDICINE

## 2017-08-19 PROCEDURE — 86850 RBC ANTIBODY SCREEN: CPT | Performed by: HOSPITALIST

## 2017-08-19 PROCEDURE — 86901 BLOOD TYPING SEROLOGIC RH(D): CPT | Performed by: HOSPITALIST

## 2017-08-19 PROCEDURE — 63710000001 INSULIN DETEMIR PER 5 UNITS: Performed by: HOSPITALIST

## 2017-08-19 PROCEDURE — 85045 AUTOMATED RETICULOCYTE COUNT: CPT | Performed by: HOSPITALIST

## 2017-08-19 PROCEDURE — 83540 ASSAY OF IRON: CPT | Performed by: HOSPITALIST

## 2017-08-19 PROCEDURE — 82272 OCCULT BLD FECES 1-3 TESTS: CPT | Performed by: INTERNAL MEDICINE

## 2017-08-19 PROCEDURE — 82607 VITAMIN B-12: CPT | Performed by: HOSPITALIST

## 2017-08-19 PROCEDURE — 86900 BLOOD TYPING SEROLOGIC ABO: CPT | Performed by: HOSPITALIST

## 2017-08-19 PROCEDURE — 25010000002 HEPARIN (PORCINE) PER 1000 UNITS: Performed by: NURSE PRACTITIONER

## 2017-08-19 PROCEDURE — 82746 ASSAY OF FOLIC ACID SERUM: CPT | Performed by: HOSPITALIST

## 2017-08-19 RX ORDER — HYDRALAZINE HYDROCHLORIDE 20 MG/ML
10 INJECTION INTRAMUSCULAR; INTRAVENOUS EVERY 6 HOURS PRN
Status: DISCONTINUED | OUTPATIENT
Start: 2017-08-19 | End: 2017-08-22 | Stop reason: HOSPADM

## 2017-08-19 RX ORDER — HALOPERIDOL 2 MG/ML
2 SOLUTION ORAL ONCE
Status: DISCONTINUED | OUTPATIENT
Start: 2017-08-19 | End: 2017-08-19

## 2017-08-19 RX ORDER — HALOPERIDOL 5 MG/ML
2 INJECTION INTRAMUSCULAR ONCE
Status: DISCONTINUED | OUTPATIENT
Start: 2017-08-19 | End: 2017-08-21

## 2017-08-19 RX ORDER — LEVOTHYROXINE SODIUM 88 UG/1
88 TABLET ORAL
Status: DISCONTINUED | OUTPATIENT
Start: 2017-08-20 | End: 2017-08-22 | Stop reason: HOSPADM

## 2017-08-19 RX ORDER — LORAZEPAM 2 MG/ML
0.25 INJECTION INTRAMUSCULAR ONCE
Status: COMPLETED | OUTPATIENT
Start: 2017-08-19 | End: 2017-08-19

## 2017-08-19 RX ADMIN — FAMOTIDINE 20 MG: 20 TABLET ORAL at 20:23

## 2017-08-19 RX ADMIN — INSULIN LISPRO 6 UNITS: 100 INJECTION, SOLUTION INTRAVENOUS; SUBCUTANEOUS at 17:13

## 2017-08-19 RX ADMIN — RALOXIFENE HYDROCHLORIDE 60 MG: 60 TABLET, FILM COATED ORAL at 08:51

## 2017-08-19 RX ADMIN — INSULIN LISPRO 4 UNITS: 100 INJECTION, SOLUTION INTRAVENOUS; SUBCUTANEOUS at 12:38

## 2017-08-19 RX ADMIN — INSULIN LISPRO 6 UNITS: 100 INJECTION, SOLUTION INTRAVENOUS; SUBCUTANEOUS at 08:54

## 2017-08-19 RX ADMIN — HEPARIN SODIUM 5000 UNITS: 5000 INJECTION, SOLUTION INTRAVENOUS; SUBCUTANEOUS at 05:02

## 2017-08-19 RX ADMIN — ATORVASTATIN CALCIUM 10 MG: 10 TABLET, FILM COATED ORAL at 20:23

## 2017-08-19 RX ADMIN — IBUPROFEN 400 MG: 400 TABLET ORAL at 03:00

## 2017-08-19 RX ADMIN — CYCLOBENZAPRINE HYDROCHLORIDE 10 MG: 10 TABLET, FILM COATED ORAL at 14:56

## 2017-08-19 RX ADMIN — VALSARTAN 160 MG: 160 TABLET, FILM COATED ORAL at 08:53

## 2017-08-19 RX ADMIN — CYCLOBENZAPRINE HYDROCHLORIDE 10 MG: 10 TABLET, FILM COATED ORAL at 03:00

## 2017-08-19 RX ADMIN — INSULIN LISPRO 6 UNITS: 100 INJECTION, SOLUTION INTRAVENOUS; SUBCUTANEOUS at 12:38

## 2017-08-19 RX ADMIN — INSULIN DETEMIR 15 UNITS: 100 INJECTION, SOLUTION SUBCUTANEOUS at 20:30

## 2017-08-19 RX ADMIN — LEVOTHYROXINE SODIUM 50 MCG: 50 TABLET ORAL at 05:02

## 2017-08-19 RX ADMIN — CLOPIDOGREL BISULFATE 75 MG: 75 TABLET ORAL at 08:51

## 2017-08-19 RX ADMIN — DULOXETINE HYDROCHLORIDE 30 MG: 30 CAPSULE, DELAYED RELEASE ORAL at 08:51

## 2017-08-19 RX ADMIN — HEPARIN SODIUM 5000 UNITS: 5000 INJECTION, SOLUTION INTRAVENOUS; SUBCUTANEOUS at 13:47

## 2017-08-19 RX ADMIN — ASPIRIN 81 MG: 81 TABLET, COATED ORAL at 08:51

## 2017-08-19 RX ADMIN — HEPARIN SODIUM 5000 UNITS: 5000 INJECTION, SOLUTION INTRAVENOUS; SUBCUTANEOUS at 20:23

## 2017-08-19 RX ADMIN — INSULIN LISPRO 4 UNITS: 100 INJECTION, SOLUTION INTRAVENOUS; SUBCUTANEOUS at 21:10

## 2017-08-19 RX ADMIN — LORAZEPAM 0.25 MG: 2 INJECTION INTRAMUSCULAR; INTRAVENOUS at 22:39

## 2017-08-19 RX ADMIN — INSULIN DETEMIR 10 UNITS: 100 INJECTION, SOLUTION SUBCUTANEOUS at 10:09

## 2017-08-19 RX ADMIN — INSULIN LISPRO 4 UNITS: 100 INJECTION, SOLUTION INTRAVENOUS; SUBCUTANEOUS at 17:13

## 2017-08-19 RX ADMIN — IBUPROFEN 400 MG: 400 TABLET ORAL at 14:56

## 2017-08-19 RX ADMIN — FAMOTIDINE 20 MG: 20 TABLET ORAL at 08:51

## 2017-08-19 NOTE — PROGRESS NOTES
"    [facility] Medicine Services  INPATIENT PROGRESS NOTE    Date of Admission: 8/15/2017  Length of Stay: 4  Primary Care Physician: Hernan Thompson MD    Subjective-- HPI/Events overnight/ROS/CC- Hospital follow visit.  Detailed ROS not detailed as performed below      Pt is awake and alert. No signs of any distress. No family present. C/o not being able to sleep because there were too much noise from construction, which there is no constructions going on on this floor. Pt stated that she was brought in due to sugar being too high, but later told me that she came her \"to get some rest.\"    Objective      Temp:  [97.8 °F (36.6 °C)-98.8 °F (37.1 °C)] 98.2 °F (36.8 °C)  Heart Rate:  [] 100  Resp:  [16-18] 16  BP: (138-181)/(75-91) 145/91    Physical Exam:  Physical Exam    NAD  RRR  CTAB  Abd soft and NT/ND, +BS  No LE edema  No focal neuro deficits, but pt is only oriented to person and time. She thought she was in [place]. She also appears to be mildly confused and have short term memory problems, repeating questions.        Results Review:    I have reviewed the labs, radiology results and diagnostic studies.      Results from last 7 days  Lab Units 08/18/17  0401   WBC 10*3/mm3 6.84   HEMOGLOBIN g/dL 8.9*   PLATELETS 10*3/mm3 483*       Results from last 7 days  Lab Units 08/18/17  0401   SODIUM mmol/L 136   POTASSIUM mmol/L 4.3   CO2 mmol/L 24.0   CREATININE mg/dL 1.10   GLUCOSE mg/dL 118*       Culture Data:  Radiology Data:     I have reviewed the medications.        Assessment/Plan     Assessment/Problem List  This is a 71 year old F, mildly confused, who has a history of uncontrolled DM2, hypothyroidism, HTN, and remote CVA, who was admitted to the ICU with DKA on 8/15, blood glucose was 940 on admission. She was transferred to regular floor and hospitalist pick her up on 8/19.    Problems:    Type 2 diabetes mellitus with ketoacidosis without coma  - pt reported that " she checks glucose morning and night. She said her sugars have fluctuated a lot since her back surgery  - normally follows with endocrinology (Dr. Valles), but have missed appointments this month due to illness. Next appnt in 11/2017.        Hypothyroidism  - TSH elevated at 9  - pt reports compliance with meds      PANFILO, unclear if she has CKD at baseline, but no proteinuria on admission  - resolved with IVF     Gastroparesis  - has chronic N/V      S/P right L4-5 laminotomy with medial facetectomy, foraminotomy and L4-5 diskectomy  - still c/o back pain (chronic and stable)      HTN (hypertension)/mildly hypotensive on admission  - sub-optimal at this time      Hyperlipidemia  - was not on high intensity Statin due to history of stroke, so will increase dose      H/O: stroke  - no residual deficits      MCI vs early dementia clinically  - F/U in OP with PCP or neuro, will discuss with family      Anemia, acute/chronic  - pt denies melena/hematochezia    Plan  - Glucose is still not controlled, will adjust insulin accordingly  - Cont to monitor  - increase synthroid to 88mcg daily and have her F/U with endocrinology in 4-6 wks  - Basic anemia w/u, poss GI consultation inpatient vs OP setting, will need to discuss with family   - PT/OT to evaluate, fall risk  - CM for DC planning      Tai Estrada MD   08/19/17   3:47 PM    Please note that portions of this note may have been completed with a voice recognition program. Efforts were made to edit the dictations, but occasionally words are mistranscribed.

## 2017-08-19 NOTE — PLAN OF CARE
Problem: Patient Care Overview (Adult)  Goal: Plan of Care Review  Outcome: Ongoing (interventions implemented as appropriate)    08/19/17 0339   Coping/Psychosocial Response Interventions   Plan Of Care Reviewed With patient   Patient Care Overview   Progress progress towards functional goals is fair

## 2017-08-20 LAB
ANION GAP SERPL CALCULATED.3IONS-SCNC: 8 MMOL/L (ref 3–11)
BACTERIA SPEC AEROBE CULT: NORMAL
BACTERIA SPEC AEROBE CULT: NORMAL
BASOPHILS # BLD AUTO: 0.05 10*3/MM3 (ref 0–0.2)
BASOPHILS NFR BLD AUTO: 0.8 % (ref 0–1)
BUN BLD-MCNC: 20 MG/DL (ref 9–23)
BUN/CREAT SERPL: 16.7 (ref 7–25)
CALCIUM SPEC-SCNC: 8.8 MG/DL (ref 8.7–10.4)
CHLORIDE SERPL-SCNC: 106 MMOL/L (ref 99–109)
CO2 SERPL-SCNC: 20 MMOL/L (ref 20–31)
CREAT BLD-MCNC: 1.2 MG/DL (ref 0.6–1.3)
DEPRECATED RDW RBC AUTO: 53.9 FL (ref 37–54)
EOSINOPHIL # BLD AUTO: 0.18 10*3/MM3 (ref 0–0.3)
EOSINOPHIL NFR BLD AUTO: 2.7 % (ref 0–3)
ERYTHROCYTE [DISTWIDTH] IN BLOOD BY AUTOMATED COUNT: 17.1 % (ref 11.3–14.5)
GFR SERPL CREATININE-BSD FRML MDRD: 44 ML/MIN/1.73
GLUCOSE BLD-MCNC: 456 MG/DL (ref 70–100)
GLUCOSE BLDC GLUCOMTR-MCNC: 408 MG/DL (ref 70–130)
GLUCOSE BLDC GLUCOMTR-MCNC: 408 MG/DL (ref 70–130)
GLUCOSE BLDC GLUCOMTR-MCNC: 409 MG/DL (ref 70–130)
GLUCOSE BLDC GLUCOMTR-MCNC: 419 MG/DL (ref 70–130)
GLUCOSE BLDC GLUCOMTR-MCNC: 427 MG/DL (ref 70–130)
GLUCOSE BLDC GLUCOMTR-MCNC: 84 MG/DL (ref 70–130)
HCT VFR BLD AUTO: 28.7 % (ref 34.5–44)
HGB BLD-MCNC: 8.8 G/DL (ref 11.5–15.5)
IMM GRANULOCYTES # BLD: 0.2 10*3/MM3 (ref 0–0.03)
IMM GRANULOCYTES NFR BLD: 3 % (ref 0–0.6)
LYMPHOCYTES # BLD AUTO: 0.72 10*3/MM3 (ref 0.6–4.8)
LYMPHOCYTES NFR BLD AUTO: 10.9 % (ref 24–44)
MCH RBC QN AUTO: 26.8 PG (ref 27–31)
MCHC RBC AUTO-ENTMCNC: 30.7 G/DL (ref 32–36)
MCV RBC AUTO: 87.5 FL (ref 80–99)
MONOCYTES # BLD AUTO: 0.36 10*3/MM3 (ref 0–1)
MONOCYTES NFR BLD AUTO: 5.4 % (ref 0–12)
NEUTROPHILS # BLD AUTO: 5.12 10*3/MM3 (ref 1.5–8.3)
NEUTROPHILS NFR BLD AUTO: 77.2 % (ref 41–71)
PLATELET # BLD AUTO: 387 10*3/MM3 (ref 150–450)
PMV BLD AUTO: 8.7 FL (ref 6–12)
POTASSIUM BLD-SCNC: 4.3 MMOL/L (ref 3.5–5.5)
RBC # BLD AUTO: 3.28 10*6/MM3 (ref 3.89–5.14)
SODIUM BLD-SCNC: 134 MMOL/L (ref 132–146)
WBC NRBC COR # BLD: 6.63 10*3/MM3 (ref 3.5–10.8)

## 2017-08-20 PROCEDURE — 25010000002 HEPARIN (PORCINE) PER 1000 UNITS: Performed by: NURSE PRACTITIONER

## 2017-08-20 PROCEDURE — 80048 BASIC METABOLIC PNL TOTAL CA: CPT | Performed by: HOSPITALIST

## 2017-08-20 PROCEDURE — 82962 GLUCOSE BLOOD TEST: CPT

## 2017-08-20 PROCEDURE — 85025 COMPLETE CBC W/AUTO DIFF WBC: CPT | Performed by: HOSPITALIST

## 2017-08-20 PROCEDURE — 97116 GAIT TRAINING THERAPY: CPT | Performed by: PHYSICAL THERAPIST

## 2017-08-20 PROCEDURE — 99232 SBSQ HOSP IP/OBS MODERATE 35: CPT | Performed by: HOSPITALIST

## 2017-08-20 PROCEDURE — 63710000001 INSULIN LISPRO (HUMAN) PER 5 UNITS: Performed by: HOSPITALIST

## 2017-08-20 PROCEDURE — 97110 THERAPEUTIC EXERCISES: CPT | Performed by: PHYSICAL THERAPIST

## 2017-08-20 PROCEDURE — 63710000001 INSULIN DETEMIR PER 5 UNITS: Performed by: HOSPITALIST

## 2017-08-20 RX ORDER — ASCORBIC ACID 500 MG
500 TABLET ORAL DAILY
Status: DISCONTINUED | OUTPATIENT
Start: 2017-08-20 | End: 2017-08-22 | Stop reason: HOSPADM

## 2017-08-20 RX ORDER — QUETIAPINE FUMARATE 25 MG/1
25 TABLET, FILM COATED ORAL NIGHTLY
Status: DISCONTINUED | OUTPATIENT
Start: 2017-08-20 | End: 2017-08-22 | Stop reason: HOSPADM

## 2017-08-20 RX ORDER — FERROUS SULFATE 325(65) MG
325 TABLET ORAL
Status: DISCONTINUED | OUTPATIENT
Start: 2017-08-21 | End: 2017-08-22 | Stop reason: HOSPADM

## 2017-08-20 RX ORDER — FOLIC ACID 1 MG/1
500 TABLET ORAL DAILY
Status: DISCONTINUED | OUTPATIENT
Start: 2017-08-20 | End: 2017-08-22 | Stop reason: HOSPADM

## 2017-08-20 RX ADMIN — RALOXIFENE HYDROCHLORIDE 60 MG: 60 TABLET, FILM COATED ORAL at 09:43

## 2017-08-20 RX ADMIN — HEPARIN SODIUM 5000 UNITS: 5000 INJECTION, SOLUTION INTRAVENOUS; SUBCUTANEOUS at 14:29

## 2017-08-20 RX ADMIN — INSULIN LISPRO 9 UNITS: 100 INJECTION, SOLUTION INTRAVENOUS; SUBCUTANEOUS at 09:44

## 2017-08-20 RX ADMIN — CLOPIDOGREL BISULFATE 75 MG: 75 TABLET ORAL at 09:43

## 2017-08-20 RX ADMIN — FAMOTIDINE 20 MG: 20 TABLET ORAL at 20:04

## 2017-08-20 RX ADMIN — ASPIRIN 81 MG: 81 TABLET, COATED ORAL at 09:43

## 2017-08-20 RX ADMIN — INSULIN LISPRO 8 UNITS: 100 INJECTION, SOLUTION INTRAVENOUS; SUBCUTANEOUS at 20:05

## 2017-08-20 RX ADMIN — INSULIN DETEMIR 20 UNITS: 100 INJECTION, SOLUTION SUBCUTANEOUS at 09:45

## 2017-08-20 RX ADMIN — CYCLOBENZAPRINE HYDROCHLORIDE 10 MG: 10 TABLET, FILM COATED ORAL at 15:18

## 2017-08-20 RX ADMIN — LEVOTHYROXINE SODIUM 88 MCG: 88 TABLET ORAL at 09:48

## 2017-08-20 RX ADMIN — VALSARTAN 160 MG: 160 TABLET, FILM COATED ORAL at 09:44

## 2017-08-20 RX ADMIN — IBUPROFEN 400 MG: 400 TABLET ORAL at 15:18

## 2017-08-20 RX ADMIN — CYCLOBENZAPRINE HYDROCHLORIDE 10 MG: 10 TABLET, FILM COATED ORAL at 20:04

## 2017-08-20 RX ADMIN — HEPARIN SODIUM 5000 UNITS: 5000 INJECTION, SOLUTION INTRAVENOUS; SUBCUTANEOUS at 20:04

## 2017-08-20 RX ADMIN — INSULIN LISPRO 9 UNITS: 100 INJECTION, SOLUTION INTRAVENOUS; SUBCUTANEOUS at 12:07

## 2017-08-20 RX ADMIN — QUETIAPINE FUMARATE 25 MG: 25 TABLET, FILM COATED ORAL at 20:04

## 2017-08-20 RX ADMIN — FAMOTIDINE 20 MG: 20 TABLET ORAL at 09:44

## 2017-08-20 RX ADMIN — INSULIN DETEMIR 25 UNITS: 100 INJECTION, SOLUTION SUBCUTANEOUS at 20:30

## 2017-08-20 RX ADMIN — ATORVASTATIN CALCIUM 10 MG: 10 TABLET, FILM COATED ORAL at 20:04

## 2017-08-20 RX ADMIN — DULOXETINE HYDROCHLORIDE 30 MG: 30 CAPSULE, DELAYED RELEASE ORAL at 09:44

## 2017-08-20 RX ADMIN — INSULIN LISPRO 6 UNITS: 100 INJECTION, SOLUTION INTRAVENOUS; SUBCUTANEOUS at 13:00
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
## 2017-08-20 NOTE — PLAN OF CARE
Problem: Patient Care Overview (Adult)  Goal: Plan of Care Review  Outcome: Ongoing (interventions implemented as appropriate)    08/20/17 1546   Coping/Psychosocial Response Interventions   Plan Of Care Reviewed With patient;spouse   Patient Care Overview   Progress progress toward functional goals as expected   Outcome Evaluation   Outcome Summary/Follow up Plan Pt still has periods of confusion and agitation. Ambulated well with PT. Refuses to wear tele at times. Blood sugars running in 400's today. Insulin increased.          Problem: Diabetes, Type 2 (Adult)  Goal: Signs and Symptoms of Listed Potential Problems Will be Absent or Manageable (Diabetes, Type 2)  Outcome: Ongoing (interventions implemented as appropriate)    Problem: Fluid Volume Deficit (Adult)  Goal: Fluid/Electrolyte Balance  Outcome: Ongoing (interventions implemented as appropriate)  Goal: Comfort/Well Being  Outcome: Ongoing (interventions implemented as appropriate)    Problem: Pressure Ulcer Risk (Ivan Scale) (Adult,Obstetrics,Pediatric)  Goal: Skin Integrity  Outcome: Ongoing (interventions implemented as appropriate)    Problem: Fall Risk (Adult)  Goal: Identify Related Risk Factors and Signs and Symptoms  Outcome: Ongoing (interventions implemented as appropriate)  Goal: Absence of Falls  Outcome: Ongoing (interventions implemented as appropriate)

## 2017-08-20 NOTE — THERAPY TREATMENT NOTE
Acute Care - Physical Therapy Treatment Note  [facility]     Patient Name: Paulina Johnson  : 1945  MRN: [id number]  Today's Date: 2017  Onset of Illness/Injury or Date of Surgery Date: 08/15/17  Date of Referral to PT: 08/15/17  Referring Physician: MD Ishan    Admit Date: 8/15/2017    Visit Dx:    ICD-10-CM ICD-9-CM   1. Type 2 diabetes mellitus with ketoacidosis without coma, unspecified long term insulin use status E13.10 250.12   2. Nausea and vomiting, intractability of vomiting not specified, unspecified vomiting type R11.2 787.01   3. Confusion R41.0 298.9   4. PANFILO (acute kidney injury) N17.9 584.9   5. Impaired mobility and ADLs Z74.09 799.89   6. Impaired functional mobility, balance, gait, and endurance Z74.09 V49.89     Patient Active Problem List   Diagnosis   • S/P right L4-5 laminotomy with medial facetectomy, foraminotomy and L4-5 diskectomy   • Hypothyroid   • HTN (hypertension)   • Hyperlipidemia   • H/O: stroke   • Right leg pain   • Status post lumbar spinal fusion   • Lumbar stenosis with neurogenic claudication   • Recurrent herniation of lumbar disc   • status post dural rent   • Type 2 diabetes mellitus with ketoacidosis without coma   • Acute on chronic kidney failure   • Non-intractable vomiting with nausea               Adult Rehabilitation Note       17 1410 17 1432 17 1430    Rehab Assessment/Intervention    Discipline physical therapist  -KL physical therapist  -SJ occupational therapist  -CL    Document Type therapy note (daily note)  -KL  therapy note (daily note)  -CL    Subjective Information agree to therapy;complains of;pain  -KL no complaints;agree to therapy  -SJ agree to therapy;no complaints  -CL    Patient Effort, Rehab Treatment adequate  -KL good  -SJ good  -CL    Symptoms Noted During/After Treatment  none  -SJ     Precautions/Limitations  fall precautions;spinal precautions   lumbar spinal precautions  -SJ fall precautions;spinal  precautions;other (see comments)   s/p TLIF on 07/05/17, exit alarm, residual R sided weakness  -CL    Recorded by [KL] Vesta Wilson, PT [SJ] Valentina Corona, PT [CL] Mary Kate Boyle, OT    Vital Signs    Pre Systolic BP Rehab   154  -CL    Pre Treatment Diastolic BP   52  -CL    Post Systolic BP Rehab  150  -  -CL    Post Treatment Diastolic BP  88  -SJ 88  -CL    Pretreatment Heart Rate (beats/min)  95  -SJ 94  -CL    Posttreatment Heart Rate (beats/min)  101  -SJ 99  -CL    Pre SpO2 (%)   98  -CL    O2 Delivery Pre Treatment   room air  -CL    Post SpO2 (%)  100  -SJ 98  -CL    O2 Delivery Post Treatment  room air  -SJ room air  -CL    Pre Patient Position   Supine  -CL    Intra Patient Position   Standing  -CL    Post Patient Position   Sitting  -CL    Recorded by  [SJ] Valentina Corona, PT [CL] Mary Kate Boyle OT    Pain Assessment    Pain Assessment 0-10  -KL No/denies pain  -SJ No/denies pain  -CL    Pain Score 10  -KL 0  -SJ     Post Pain Score 8  -KL 0  -SJ     Pain Location Back  -KL      Recorded by [KL] Vesta Wilson, PT [SJ] Valentina Corona, PT [CL] Mary Kate Boyle OT    Cognitive Assessment/Intervention    Current Cognitive/Communication Assessment impaired  -KL impaired  -SJ impaired  -CL    Orientation Status  oriented to;person  -SJ oriented x 4  -CL    Follows Commands/Answers Questions 75% of the time;able to follow single-step instructions  -KL 75% of the time;able to follow single-step instructions;needs cueing  -SJ 75% of the time;needs cueing;needs increased time;needs repetition  -CL    Personal Safety  unaware of cognitive deficits;decreased awareness, need for safety;decreased insight to deficits  -SJ moderate impairment;decreased awareness, need for assist;decreased awareness, need for safety;decreased insight to deficits;severe impairment;impulsive   Pt could not recall spinal precautions, tried to bend/twist  -CL    Personal Safety Interventions  fall prevention program  maintained;gait belt;muscle strengthening facilitated;nonskid shoes/slippers when out of bed  -SJ gait belt;fall prevention program maintained;nonskid shoes/slippers when out of bed  -CL    Recorded by [KL] Vesta Wilson, PT [SJ] Valentina Corona, PT [CL] Mary Kate Boyle, OT    Bed Mobility, Assessment/Treatment    Bed Mob, Supine to Sit, [place] verbal cues required;contact guard assist  -KL conditional independence  -SJ supervision required;verbal cues required  -CL    Bed Mob, Sit to Supine, [place] verbal cues required;contact guard assist  -KL  not tested  -CL    Bed Mobility, Comment  Pt impulsively sat up without concern for spinal precautions.   -SJ Pt sat up into long-sitting impulsively in bed prior to cueing for log-roll technique, however required no assist.   -CL    Recorded by [KL] Vesta Wilson, PT [SJ] Valentina Corona, PT [CL] Mary Kate Boyle OT    Transfer Assessment/Treatment    Transfers, Sit-Stand [place] verbal cues required;contact guard assist  -KL contact guard assist;2 person assist required;verbal cues required  -SJ contact guard assist;2 person assist required;verbal cues required  -CL    Transfers, Stand-Sit [place] verbal cues required;contact guard assist  -KL contact guard assist;verbal cues required  -SJ contact guard assist;2 person assist required;verbal cues required  -CL    Transfers, Sit-Stand-Sit, Assist Device  rolling walker  -SJ rolling walker  -CL    Toilet Transfer, [place]  contact guard assist  -SJ contact guard assist;2 person assist required;verbal cues required  -CL    Toilet Transfer, Assistive Device  bedside commode without drop arms;rolling walker  -SJ rolling walker;bedside commode without drop arms  -CL    Transfer, Safety Issues  weight-shifting ability decreased;impulsivity  -SJ     Transfer, Impairments  strength decreased;impaired balance  -SJ     Transfer, Comment  Decreased safety awareness, cues for hand placement  -SJ VCs for  HP/sequencing.   -CL    Recorded by [KL] Vesta Wilson, PT [SJ] Valentina Corona, PT [CL] Mary Kate Boyle OT    Gait Assessment/Treatment    Gait, [place] Level contact guard assist  -KL minimum assist (75% patient effort);1 person + 1 person to manage equipment;verbal cues required  -SJ     Gait, Assistive Device rolling walker  -KL rolling walker  -SJ     Gait, Distance (Feet) 300  -  -SJ     Gait, Gait Pattern Analysis  swing-through gait  -SJ     Gait, Gait Deviations  lin decreased;decreased heel strike;double stance time increased;forward flexed posture  -SJ     Gait, Safety Issues  step length decreased;balance decreased during turns  -SJ     Gait, Impairments  strength decreased;impaired balance;coordination impaired  -SJ     Gait, Comment impulsive, continuous VC's to stay inside walker  -KL Pt required Jerome for RW to steer straight and [place] to correct during LOB.   -SJ     Recorded by [KL] Vesta Wilson, PT [SJ] Valentina Corona, PT     Functional Mobility    Functional Mobility- Ind. Level   minimum assist (75% patient effort);2 person assist required;verbal cues required  -CL    Functional Mobility- Device   rolling walker  -CL    Functional Mobility-Distance (Feet)   250  -CL    Functional Mobility- Comment   Pt required constant assist for RW management d/t L lateral drifting and pt keeping RW too far in front. Pt will impulsively let go of RW and demo episodes of LOB.   -CL    Recorded by   [CL] Mary Kate Boyle OT    Lower Body Dressing Assessment/Training    LB Dressing Assess/Train, Clothing Type   donning:;socks  -CL    LB Dressing Assess/Train, [place]   verbal cues required  -CL    LB Dressing Assess/Train, Comment   Pt attempted to bend forward to don socks while in long-sitting in bed. Pt educated that this is not safe per spinal precautions, educated on appropriate technique though pt declined to perform.    -CL    Recorded by   [CL] Mary Kate Boyle OT    Toileting  Assessment/Training    Toileting Assess/Train, Position   sitting  -CL    Toileting Assess/Train, Indepen Level   set up required;verbal cues required  -CL    Toileting Assess/Train, Comment   Pt required VCs for attention to task, though required no assist.   -CL    Recorded by   [CL] Mary Kate Boyle OT    Grooming Assessment/Training    Grooming Assess/Train, Position   sitting  -CL    Grooming Assess/Train, Indepen Level   set up required  -CL    Grooming Assess/Train, Comment   Pt washed face w/ wipe.   -CL    Recorded by   [CL] Mary Kate Boyle OT    Motor Skills/Interventions    Additional Documentation   Balance Skills Training (Group)  -CL    Recorded by   [CL] Mary Kate Boyle OT    Balance Skills Training    Training Strategies (Balance Skills) Refused balance activities and LE exercise - states she is too sore   -KL      Sitting-Level of Assistance  Close supervision  -SJ Close supervision  -CL    Sitting-Balance Support  Right upper extremity supported;Left upper extremity supported  -SJ Right upper extremity supported;Left upper extremity supported;Feet supported  -CL    Sitting-Balance Activities   Forward lean;Reaching for objects;Trunk control activities  -CL    Standing-Level of Assistance  Contact guard  -SJ Contact guard  -CL    Static Standing Balance Support  assistive device  -SJ assistive device  -CL    Standing-Balance Activities   Weight Shift A-P;Weight Shift R-L  -CL    Gait Balance-Level of Assistance  Minimum assistance  -SJ Minimum assistance;x2  -CL    Gait Balance Support  assistive device  -SJ assistive device  -CL    Gait Balance Activities   scanning environment R/L;side-stepping  -CL    Recorded by [KL] Vesta Wilson, PT [SJ] Valentina Corona, PT [CL] Mary Kate Boyle OT    Therapy Exercises    Bilateral Lower Extremities --   refused  -KL      Recorded by [KL] Vesta Wilson, PT      Positioning and Restraints    Pre-Treatment Position in bed  -KL in bed  -SJ in bed  -CL    Post  Treatment Position bed  -KL chair  -SJ chair  -CL    In Bed notified nsg;supine;call light within reach;encouraged to call for assist;exit alarm on;with family/caregiver   Pt instructed to stay in bed unless RN is notified   -KL      In Chair --   exit alarm was turned on  -KL notified nsg;reclined;call light within reach;encouraged to call for assist;exit alarm on;heels elevated  -SJ notified nsg;reclined;call light within reach;encouraged to call for assist;exit alarm on;waffle cushion;legs elevated;heels elevated  -CL    Recorded by [KL] Vesta Wilson, PT [SJ] Valentina Corona, PT [CL] Mary Kate Boyle, OT      User Key  (r) = Recorded By, (t) = Taken By, (c) = Cosigned By    Initials Name Effective Dates    SJ Valentina Corona, PT 06/19/15 -     KL Vesta Wilson, PT 05/18/15 -     CL Mary Kate Boyle, OT 06/08/16 -                 IP PT Goals       08/17/17 1318 08/16/17 1556       Bed Mobility PT LTG    Bed Mobility PT LTG, Date Established  08/16/17  -SJ     Bed Mobility PT LTG, Time to Achieve  2 wks  -SJ     Bed Mobility PT LTG, Activity Type  roll left/roll right;supine to sit/sit to supine  -SJ     Bed Mobility PT LTG, [place] Level  supervision required  -SJ     Bed Mobility PT LTG, Outcome goal ongoing  -DELORES (r) KR (t) DELORES (c) goal ongoing  -SJ     Transfer Training PT LTG    Transfer Training PT LTG, Date Established  08/16/17  -SJ     Transfer Training PT LTG, Time to Achieve  2 wks  -SJ     Transfer Training PT LTG, Activity Type  bed to chair /chair to bed;sit to stand/stand to sit  -SJ     Transfer Training PT LTG, [place] Level  supervision required  -SJ     Transfer Training PT LTG, Assist Device  walker, rolling  -SJ     Transfer Training PT LTG, Outcome goal ongoing  -DELORES (r) KR (t) DELORES (c) goal ongoing  -SJ     Gait Training PT LTG    Gait Training Goal PT LTG, Date Established  08/16/17  -SJ     Gait Training Goal PT LTG, Time to Achieve  2 wks  -SJ     Gait Training Goal PT LTG,  [place] Level  contact guard assist  -SJ     Gait Training Goal PT LTG, Assist Device  walker, rolling  -SJ     Gait Training Goal PT LTG, Distance to Achieve  500  -SJ     Gait Training Goal PT LTG, Outcome goal ongoing  -DELORES (r) KR (t) DELORES (c) goal ongoing  -SJ       User Key  (r) = Recorded By, (t) = Taken By, (c) = Cosigned By    Initials Name Provider Type    DELORES Motta, PT Physical Therapist    SJ Valentina Corona, PT Physical Therapist    NEISHA Naqvi, PT Student PT Student          Physical Therapy Education     Title: PT OT SLP Therapies (Active)     Topic: Physical Therapy (Active)     Point: Mobility training (Active)    Learning Progress Summary    Learner Readiness Method Response Comment Documented by Status   Patient Acceptance E NR  KL 08/20/17 1521 Active    Acceptance E,D NR   08/18/17 1522 Active    Acceptance E NR  KR 08/17/17 1318 Active    Acceptance E NR  SJ 08/16/17 1559 Active               Point: Home exercise program (Active)    Learning Progress Summary    Learner Readiness Method Response Comment Documented by Status   Patient Acceptance E NR  KL 08/20/17 1521 Active    Acceptance E,D NR  SJ 08/18/17 1522 Active    Acceptance E NR  SJ 08/16/17 1559 Active               Point: Body mechanics (Active)    Learning Progress Summary    Learner Readiness Method Response Comment Documented by Status   Patient Acceptance E NR  KL 08/20/17 1521 Active    Acceptance E,D NR  SJ 08/18/17 1522 Active    Acceptance E NR  KR 08/17/17 1318 Active    Acceptance E NR  SJ 08/16/17 1559 Active               Point: Precautions (Active)    Learning Progress Summary    Learner Readiness Method Response Comment Documented by Status   Patient Acceptance E NR  KL 08/20/17 1521 Active    Acceptance E,D NR  SJ 08/18/17 1522 Active    Acceptance E NR  KR 08/17/17 1318 Active    Acceptance E NR  SJ 08/16/17 1559 Active                      User Key     Initials Effective Dates Name Provider Type  Discipline    SJ 06/19/15 -  Valentina Corona, PT Physical Therapist PT    KL 05/18/15 -  Vesta Wilson, PT Physical Therapist PT    KR 05/30/17 -  Clotilde Naqvi, PT Student PT Student PT                    PT Recommendation and Plan  Anticipated Discharge Disposition: inpatient rehabilitation facility  Planned Therapy Interventions: balance training, bed mobility training, gait training, home exercise program, patient/family education, strengthening, transfer training  PT Frequency: daily  Plan of Care Review  Plan Of Care Reviewed With: patient, spouse  Progress: improving  Outcome Summary/Follow up Plan: Pt was able to tolerate treatment. Ambulation x 300 ft did decrease LE and back pain. Instructed to continue to move in bed via exercise.           Outcome Measures       08/18/17 1432 08/18/17 1430       How much help from another person do you currently need...    Turning from your back to your side while in flat bed without using bedrails? 4  -SJ      Moving from lying on back to sitting on the side of a flat bed without bedrails? 4  -SJ      Moving to and from a bed to a chair (including a wheelchair)? 3  -SJ      Standing up from a chair using your arms (e.g., wheelchair, bedside chair)? 3  -SJ      Climbing 3-5 steps with a railing? 3  -SJ      To walk in hospital room? 3  -SJ      AM-PAC 6 Clicks Score 20  -SJ      How much help from another is currently needed...    Putting on and taking off regular lower body clothing?  2  -CL     Bathing (including washing, rinsing, and drying)  2  -CL     Toileting (which includes using toilet bed pan or urinal)  4  -CL     Putting on and taking off regular upper body clothing  3  -CL     Taking care of personal grooming (such as brushing teeth)  3  -CL     Eating meals  4  -CL     Score  18  -CL     Functional Assessment    Outcome Measure Options AM-PAC 6 Clicks Basic Mobility (PT)  -SJ AM-PAC 6 Clicks Daily Activity (OT)  -CL       User Key  (r) = Recorded By,  (t) = Taken By, (c) = Cosigned By    Initials Name Provider Type    NISHANT Corona, PT Physical Therapist    CL Mary Kate Boyle, OT Occupational Therapist           Time Calculation:         PT Charges       08/20/17 1523          Time Calculation    Start Time 1410  -KL      Time Calculation- PT    Total Timed Code Minutes- PT 25 minute(s)  -        User Key  (r) = Recorded By, (t) = Taken By, (c) = Cosigned By    Initials Name Provider Type     Vesta Wilson, PT Physical Therapist          Therapy Charges for Today     Code Description Service Date Service Provider Modifiers Qty    84181034534 HC PT THER SUPP EA 15 MIN 8/20/2017 Vesta Wilson, PT GP 2    28800334095 HC PT THER PROC EA 15 MIN 8/20/2017 Vesta iWlson, PT GP 1    07096998425 HC GAIT TRAINING EA 15 MIN 8/20/2017 Vesta Wilson, PT GP 1          PT G-Codes  Outcome Measure Options: AM-PAC 6 Clicks Basic Mobility (PT)    Vesta Wilson, PT  8/20/2017

## 2017-08-20 NOTE — NURSING NOTE
"Patient resting quietly. Finally keeping leads on, without pulling off. Spouse refused to have blood drawn this morning, does not want her \"woke up, until she wakes up so she will be at herself.\" Resp even and unlabored. Patient will arouse and move around in bed with any noise in room. Spouse at bedside. Will monitor  "
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
## 2017-08-20 NOTE — PLAN OF CARE
Problem: Patient Care Overview (Adult)  Goal: Plan of Care Review  Outcome: Ongoing (interventions implemented as appropriate)    08/20/17 1522   Coping/Psychosocial Response Interventions   Plan Of Care Reviewed With patient;spouse   Patient Care Overview   Progress improving   Outcome Evaluation   Outcome Summary/Follow up Plan Pt was able to tolerate treatment. Ambulation x 300 ft did decrease LE and back pain. Instructed to continue to move in bed via exercise.          Problem: Inpatient Physical Therapy  Goal: Bed Mobility Goal LTG- PT  Outcome: Ongoing (interventions implemented as appropriate)    08/16/17 1556 08/17/17 1318   Bed Mobility PT LTG   Bed Mobility PT LTG, Date Established 08/16/17 --    Bed Mobility PT LTG, Time to Achieve 2 wks --    Bed Mobility PT LTG, Activity Type roll left/roll right;supine to sit/sit to supine --    Bed Mobility PT LTG, [place] Level supervision required --    Bed Mobility PT LTG, Outcome --  goal ongoing       Goal: Transfer Training Goal 1 LTG- PT  Outcome: Ongoing (interventions implemented as appropriate)    08/16/17 1556 08/17/17 1318   Transfer Training PT LTG   Transfer Training PT LTG, Date Established 08/16/17 --    Transfer Training PT LTG, Time to Achieve 2 wks --    Transfer Training PT LTG, Activity Type bed to chair /chair to bed;sit to stand/stand to sit --    Transfer Training PT LTG, [place] Level supervision required --    Transfer Training PT LTG, Assist Device walker, rolling --    Transfer Training PT LTG, Outcome --  goal ongoing       Goal: Gait Training Goal LTG- PT  Outcome: Ongoing (interventions implemented as appropriate)    08/16/17 1556 08/17/17 1318   Gait Training PT LTG   Gait Training Goal PT LTG, Date Established 08/16/17 --    Gait Training Goal PT LTG, Time to Achieve 2 wks --    Gait Training Goal PT LTG, [place] Level contact guard assist --    Gait Training Goal PT LTG, Assist Device walker, rolling --    Gait  Training Goal PT LTG, Distance to Achieve 500 --    Gait Training Goal PT LTG, Outcome --  goal ongoing

## 2017-08-20 NOTE — NURSING NOTE
Dr. Larry and Alex YATES at bedside. Patient still confused, but not combative at this time. Patient wants to leave and go home. Refuses to swallow Flexeril or any other medication. Will continue to monitor.  at bedside

## 2017-08-20 NOTE — PROGRESS NOTES
[facility] Medicine Services  INPATIENT PROGRESS NOTE    Date of Admission: 8/15/2017  Length of Stay: 5  Primary Care Physician: Hernan Thompson MD    Subjective-- HPI/Events overnight/ROS/CC- Hospital follow visit.  Detailed ROS not detailed as performed below      Sleeping peacefully with  at bedside. Pt was agitated overnight and received Ativan. No other complaints.    Objective      Temp:  [98.4 °F (36.9 °C)-99.5 °F (37.5 °C)] 99.5 °F (37.5 °C)  Heart Rate:  [102-111] 111  Resp:  [16-17] 17  BP: (142)/(61-85) 142/61    Physical Exam:  Physical Exam    NAD  RRR  CTAB  Abd soft and NT/ND, +BS  No LE edema  No focal neuro deficits, moving all extremities       Results Review:    I have reviewed the labs, radiology results and diagnostic studies.      Results from last 7 days  Lab Units 08/20/17  1108   WBC 10*3/mm3 6.63   HEMOGLOBIN g/dL 8.8*   PLATELETS 10*3/mm3 387       Results from last 7 days  Lab Units 08/20/17  1108   SODIUM mmol/L 134   POTASSIUM mmol/L 4.3   CO2 mmol/L 20.0   CREATININE mg/dL 1.20   GLUCOSE mg/dL 456*       Culture Data:  Radiology Data:     I have reviewed the medications.        Assessment/Plan     Assessment/Problem List    This is a 71 year old F, mildly confused, who has a history of uncontrolled DM2, hypothyroidism, HTN, and remote CVA, who was admitted to the ICU with DKA on 8/15, blood glucose was 940 on admission. She was transferred to regular floor and hospitalist pick her up on 8/19.     Problems:    Type 2 diabetes mellitus with ketoacidosis without coma  - pt reported that she checks glucose morning and night. She said her sugars have fluctuated a lot since her back surgery  - normally follows with endocrinology (Dr. Valles), but have missed appointments this month due to illness. Next appnt in 11/2017.        Hypothyroidism  - TSH elevated at 9  - pt reports compliance with meds  - Synthroid increased to 88mcg      PANFILO, unclear if  she has CKD at baseline, but no proteinuria on admission  - resolved with IVF      Gastroparesis  - has chronic N/V       S/P right L4-5 laminotomy with medial facetectomy, foraminotomy and L4-5 diskectomy  - still c/o back pain (chronic and stable)       HTN (hypertension)/mildly hypotensive on admission  - sub-optimal at this time       Hyperlipidemia  - was not on high intensity Statin due to history of stroke, so will increase dose       H/O: stroke  - no residual deficits       MCI vs early dementia clinically  - F/U in OP with PCP or neuro, discuss with  about OP F/U with behavioral neurologist       Anemia, acute/chronic-etiology unclear  - pt denies melena/hematochezia  - Guaiac neg  - Iron low, will replete and add Vitc and Folate too?  - Will defer W/U to PCP in OP setting for now    Plan  - Glucose cont to be labile. Adjusting insulin cautiously due to inconsistent PO intake and probable dementia  - Discussed with  about F/U in OP with neuro as well as need for supervision with patient's medications.  - Not ready for discharge yet.  - Ongoing PT/OT, may need rehab, risk for fall    Tai Estrada MD   08/20/17   4:29 PM    Please note that portions of this note may have been completed with a voice recognition program. Efforts were made to edit the dictations, but occasionally words are mistranscribed.

## 2017-08-20 NOTE — NURSING NOTE
Patient resting quietly,  at bedside. Patient pulls off leads to monitor every time they are placed on.  ask to leave leads off and let her rest. Resp even and unlabored. Spo2 97% RA

## 2017-08-20 NOTE — PLAN OF CARE
Problem: Patient Care Overview (Adult)  Goal: Plan of Care Review  Outcome: Ongoing (interventions implemented as appropriate)    08/20/17 7895   Coping/Psychosocial Response Interventions   Plan Of Care Reviewed With spouse   Patient Care Overview   Progress progress towards functional goals is fair

## 2017-08-21 LAB
ANION GAP SERPL CALCULATED.3IONS-SCNC: 7 MMOL/L (ref 3–11)
BASOPHILS # BLD AUTO: 0.03 10*3/MM3 (ref 0–0.2)
BASOPHILS NFR BLD AUTO: 0.5 % (ref 0–1)
BUN BLD-MCNC: 18 MG/DL (ref 9–23)
BUN/CREAT SERPL: 18 (ref 7–25)
CALCIUM SPEC-SCNC: 9 MG/DL (ref 8.7–10.4)
CHLORIDE SERPL-SCNC: 108 MMOL/L (ref 99–109)
CO2 SERPL-SCNC: 22 MMOL/L (ref 20–31)
CREAT BLD-MCNC: 1 MG/DL (ref 0.6–1.3)
DEPRECATED RDW RBC AUTO: 53 FL (ref 37–54)
EOSINOPHIL # BLD AUTO: 0.34 10*3/MM3 (ref 0–0.3)
EOSINOPHIL NFR BLD AUTO: 5.5 % (ref 0–3)
ERYTHROCYTE [DISTWIDTH] IN BLOOD BY AUTOMATED COUNT: 17.1 % (ref 11.3–14.5)
GFR SERPL CREATININE-BSD FRML MDRD: 55 ML/MIN/1.73
GLUCOSE BLD-MCNC: 160 MG/DL (ref 70–100)
GLUCOSE BLDC GLUCOMTR-MCNC: 157 MG/DL (ref 70–130)
GLUCOSE BLDC GLUCOMTR-MCNC: 238 MG/DL (ref 70–130)
GLUCOSE BLDC GLUCOMTR-MCNC: 351 MG/DL (ref 70–130)
GLUCOSE BLDC GLUCOMTR-MCNC: 364 MG/DL (ref 70–130)
HCT VFR BLD AUTO: 29.3 % (ref 34.5–44)
HGB BLD-MCNC: 9 G/DL (ref 11.5–15.5)
IMM GRANULOCYTES # BLD: 0.23 10*3/MM3 (ref 0–0.03)
IMM GRANULOCYTES NFR BLD: 3.7 % (ref 0–0.6)
LYMPHOCYTES # BLD AUTO: 1.6 10*3/MM3 (ref 0.6–4.8)
LYMPHOCYTES NFR BLD AUTO: 25.9 % (ref 24–44)
MCH RBC QN AUTO: 26.3 PG (ref 27–31)
MCHC RBC AUTO-ENTMCNC: 30.7 G/DL (ref 32–36)
MCV RBC AUTO: 85.7 FL (ref 80–99)
MONOCYTES # BLD AUTO: 0.69 10*3/MM3 (ref 0–1)
MONOCYTES NFR BLD AUTO: 11.2 % (ref 0–12)
NEUTROPHILS # BLD AUTO: 3.29 10*3/MM3 (ref 1.5–8.3)
NEUTROPHILS NFR BLD AUTO: 53.2 % (ref 41–71)
PLATELET # BLD AUTO: 425 10*3/MM3 (ref 150–450)
PMV BLD AUTO: 8.5 FL (ref 6–12)
POTASSIUM BLD-SCNC: 4.2 MMOL/L (ref 3.5–5.5)
RBC # BLD AUTO: 3.42 10*6/MM3 (ref 3.89–5.14)
SODIUM BLD-SCNC: 137 MMOL/L (ref 132–146)
WBC NRBC COR # BLD: 6.18 10*3/MM3 (ref 3.5–10.8)

## 2017-08-21 PROCEDURE — 25010000002 HEPARIN (PORCINE) PER 1000 UNITS: Performed by: NURSE PRACTITIONER

## 2017-08-21 PROCEDURE — 97530 THERAPEUTIC ACTIVITIES: CPT

## 2017-08-21 PROCEDURE — 99232 SBSQ HOSP IP/OBS MODERATE 35: CPT | Performed by: PHYSICIAN ASSISTANT

## 2017-08-21 PROCEDURE — 97116 GAIT TRAINING THERAPY: CPT

## 2017-08-21 PROCEDURE — 82962 GLUCOSE BLOOD TEST: CPT

## 2017-08-21 PROCEDURE — 85025 COMPLETE CBC W/AUTO DIFF WBC: CPT | Performed by: HOSPITALIST

## 2017-08-21 PROCEDURE — 63710000001 INSULIN DETEMIR PER 5 UNITS: Performed by: HOSPITALIST

## 2017-08-21 PROCEDURE — 80048 BASIC METABOLIC PNL TOTAL CA: CPT | Performed by: HOSPITALIST

## 2017-08-21 RX ADMIN — Medication 325 MG: at 09:23

## 2017-08-21 RX ADMIN — FAMOTIDINE 20 MG: 20 TABLET ORAL at 09:24

## 2017-08-21 RX ADMIN — HEPARIN SODIUM 5000 UNITS: 5000 INJECTION, SOLUTION INTRAVENOUS; SUBCUTANEOUS at 06:49

## 2017-08-21 RX ADMIN — HEPARIN SODIUM 5000 UNITS: 5000 INJECTION, SOLUTION INTRAVENOUS; SUBCUTANEOUS at 15:04

## 2017-08-21 RX ADMIN — CLOPIDOGREL BISULFATE 75 MG: 75 TABLET ORAL at 09:24

## 2017-08-21 RX ADMIN — IBUPROFEN 400 MG: 400 TABLET ORAL at 09:26

## 2017-08-21 RX ADMIN — RALOXIFENE HYDROCHLORIDE 60 MG: 60 TABLET, FILM COATED ORAL at 10:28

## 2017-08-21 RX ADMIN — CYCLOBENZAPRINE HYDROCHLORIDE 10 MG: 10 TABLET, FILM COATED ORAL at 20:40

## 2017-08-21 RX ADMIN — INSULIN DETEMIR 25 UNITS: 100 INJECTION, SOLUTION SUBCUTANEOUS at 20:40

## 2017-08-21 RX ADMIN — VALSARTAN 160 MG: 160 TABLET, FILM COATED ORAL at 09:25

## 2017-08-21 RX ADMIN — INSULIN LISPRO 4 UNITS: 100 INJECTION, SOLUTION INTRAVENOUS; SUBCUTANEOUS at 20:39

## 2017-08-21 RX ADMIN — ATORVASTATIN CALCIUM 10 MG: 10 TABLET, FILM COATED ORAL at 20:40

## 2017-08-21 RX ADMIN — CYCLOBENZAPRINE HYDROCHLORIDE 10 MG: 10 TABLET, FILM COATED ORAL at 15:04

## 2017-08-21 RX ADMIN — FOLIC ACID 500 MCG: 1 TABLET ORAL at 09:25

## 2017-08-21 RX ADMIN — INSULIN LISPRO 8 UNITS: 100 INJECTION, SOLUTION INTRAVENOUS; SUBCUTANEOUS at 17:54

## 2017-08-21 RX ADMIN — INSULIN LISPRO 2 UNITS: 100 INJECTION, SOLUTION INTRAVENOUS; SUBCUTANEOUS at 09:20

## 2017-08-21 RX ADMIN — INSULIN DETEMIR 25 UNITS: 100 INJECTION, SOLUTION SUBCUTANEOUS at 09:21

## 2017-08-21 RX ADMIN — INSULIN LISPRO 8 UNITS: 100 INJECTION, SOLUTION INTRAVENOUS; SUBCUTANEOUS at 12:16

## 2017-08-21 RX ADMIN — LEVOTHYROXINE SODIUM 88 MCG: 88 TABLET ORAL at 06:49

## 2017-08-21 RX ADMIN — FAMOTIDINE 20 MG: 20 TABLET ORAL at 20:40

## 2017-08-21 RX ADMIN — IBUPROFEN 400 MG: 400 TABLET ORAL at 14:55

## 2017-08-21 RX ADMIN — ASPIRIN 81 MG: 81 TABLET, COATED ORAL at 09:40

## 2017-08-21 RX ADMIN — HEPARIN SODIUM 5000 UNITS: 5000 INJECTION, SOLUTION INTRAVENOUS; SUBCUTANEOUS at 20:39

## 2017-08-21 RX ADMIN — IBUPROFEN 400 MG: 400 TABLET ORAL at 20:40

## 2017-08-21 RX ADMIN — CYCLOBENZAPRINE HYDROCHLORIDE 10 MG: 10 TABLET, FILM COATED ORAL at 09:24

## 2017-08-21 RX ADMIN — QUETIAPINE FUMARATE 25 MG: 25 TABLET, FILM COATED ORAL at 20:40

## 2017-08-21 NOTE — THERAPY TREATMENT NOTE
Acute Care - Occupational Therapy Treatment Note  [facility]     Patient Name: Paulina Johnson  : 1945  MRN: [id number]  Today's Date: 2017  Onset of Illness/Injury or Date of Surgery Date: 08/15/17  Date of Referral to OT: 08/15/17  Referring Physician: MD Ishan      Admit Date: 8/15/2017    Visit Dx:     ICD-10-CM ICD-9-CM   1. Type 2 diabetes mellitus with ketoacidosis without coma, unspecified long term insulin use status E13.10 250.12   2. Nausea and vomiting, intractability of vomiting not specified, unspecified vomiting type R11.2 787.01   3. Confusion R41.0 298.9   4. PANFILO (acute kidney injury) N17.9 584.9   5. Impaired mobility and ADLs Z74.09 799.89   6. Impaired functional mobility, balance, gait, and endurance Z74.09 V49.89     Patient Active Problem List   Diagnosis   • S/P right L4-5 laminotomy with medial facetectomy, foraminotomy and L4-5 diskectomy   • Hypothyroid   • HTN (hypertension)   • Hyperlipidemia   • H/O: stroke   • Right leg pain   • Status post lumbar spinal fusion   • Lumbar stenosis with neurogenic claudication   • Recurrent herniation of lumbar disc   • status post dural rent   • Type 2 diabetes mellitus with ketoacidosis without coma   • Acute on chronic kidney failure   • Non-intractable vomiting with nausea             Adult Rehabilitation Note       17 0840 17 1410 17 1432    Rehab Assessment/Intervention    Discipline occupational therapist  -AC physical therapist  -KL physical therapist  -SJ    Document Type therapy note (daily note)  -AC therapy note (daily note)  -KL     Subjective Information agree to therapy;complains of;pain  -AC agree to therapy;complains of;pain  -KL no complaints;agree to therapy  -SJ    Patient Effort, Rehab Treatment  adequate  -KL good  -SJ    Symptoms Noted During/After Treatment   none  -SJ    Precautions/Limitations fall precautions;spinal precautions  -AC  fall precautions;spinal precautions   lumbar spinal  precautions  -SJ    Recorded by [AC] Mercedes Shah, OT [KL] Vesta Wilson, PT [SJ] Valentina Corona, PT    Vital Signs    Post Systolic BP Rehab   150  -SJ    Post Treatment Diastolic BP   88  -SJ    Pretreatment Heart Rate (beats/min)   95  -SJ    Posttreatment Heart Rate (beats/min)   101  -SJ    Post SpO2 (%)   100  -SJ    O2 Delivery Post Treatment   room air  -SJ    Recorded by   [SJ] Valentina Corona, PT    Pain Assessment    Pain Assessment 0-10  -AC 0-10  -KL No/denies pain  -SJ    Pain Score 4  -AC 10  -KL 0  -SJ    Post Pain Score 6  -AC 8  -KL 0  -SJ    Pain Type Acute pain  -AC      Pain Location Back  -AC Back  -KL     Pain Orientation Left  -AC      Pain Intervention(s) Medication (See MAR);Repositioned  -AC      Recorded by [AC] Mercedes Shah, OT [KL] Vesta Wilson, PT [SJ] Valentina Corona, PT    Cognitive Assessment/Intervention    Current Cognitive/Communication Assessment impaired  -AC impaired  - impaired  -    Orientation Status oriented to;person  -AC  oriented to;person  -    Follows Commands/Answers Questions 75% of the time;needs cueing;needs repetition  -AC 75% of the time;able to follow single-step instructions  - 75% of the time;able to follow single-step instructions;needs cueing  -    Personal Safety decreased awareness, need for assist;decreased awareness, need for safety  -  unaware of cognitive deficits;decreased awareness, need for safety;decreased insight to deficits  -    Personal Safety Interventions fall prevention program maintained;gait belt;nonskid shoes/slippers when out of bed  -AC  fall prevention program maintained;gait belt;muscle strengthening facilitated;nonskid shoes/slippers when out of bed  -SJ    Recorded by [AC] Mercedes Shah, OT [KL] Vesta Wilson, PT [SJ] Valentina Corona, PT    Bed Mobility, Assessment/Treatment    Bed Mobility, Assistive Device bed rails;head of bed elevated  -AC      Bed Mob, Supine to Sit, [place] verbal cues  required;contact guard assist  -AC verbal cues required;contact guard assist  -KL conditional independence  -SJ    Bed Mob, Sit to Supine, [place] verbal cues required;contact guard assist  -AC verbal cues required;contact guard assist  -KL     Bed Mobility, Safety Issues decreased use of arms for pushing/pulling;decreased use of legs for bridging/pushing  -AC      Bed Mobility, Impairments strength decreased;impaired balance  -AC      Bed Mobility, Comment multiple cues to log roll  -AC  Pt impulsively sat up without concern for spinal precautions.   -SJ    Recorded by [AC] Mercedes Shah, OT [KL] Vesta Wilson, PT [SJ] Valentina Corona, PT    Transfer Assessment/Treatment    Transfers, Sit-Stand [place] verbal cues required;contact guard assist  -AC verbal cues required;contact guard assist  -KL contact guard assist;2 person assist required;verbal cues required  -SJ    Transfers, Stand-Sit [place] verbal cues required;contact guard assist  -AC verbal cues required;contact guard assist  -KL contact guard assist;verbal cues required  -SJ    Transfers, Sit-Stand-Sit, Assist Device rolling walker  -AC  rolling walker  -SJ    Toilet Transfer, [place] contact guard assist  -AC  contact guard assist  -SJ    Toilet Transfer, Assistive Device bedside commode without drop arms  -AC  bedside commode without drop arms;rolling walker  -SJ    Transfer, Safety Issues   weight-shifting ability decreased;impulsivity  -SJ    Transfer, Impairments strength decreased;impaired balance  -AC  strength decreased;impaired balance  -SJ    Transfer, Comment verbal cues for hand placement, to place brakes on rollator to sit and stand  -AC  Decreased safety awareness, cues for hand placement  -SJ    Recorded by [AC] Mercedes Shah, OT [KL] Vesta Wilson, PT [SJ] Valentina Corona, PT    Gait Assessment/Treatment    Gait, [place] Level  contact guard assist  -KL minimum assist (75% patient effort);1 person + 1  person to manage equipment;verbal cues required  -SJ    Gait, Assistive Device  rolling walker  -KL rolling walker  -SJ    Gait, Distance (Feet)  300  -  -SJ    Gait, Gait Pattern Analysis   swing-through gait  -SJ    Gait, Gait Deviations   lin decreased;decreased heel strike;double stance time increased;forward flexed posture  -SJ    Gait, Safety Issues   step length decreased;balance decreased during turns  -SJ    Gait, Impairments   strength decreased;impaired balance;coordination impaired  -    Gait, Comment  impulsive, continuous VC's to stay inside walker  - Pt required Jerome for RW to steer straight and [place] to correct during LOB.   -SJ    Recorded by  [KL] Vesta Wilson, PT [] Valentina Corona, PT    Functional Mobility    Functional Mobility- Ind. Level verbal cues required;contact guard assist  -AC      Functional Mobility- Device rollator  -AC      Functional Mobility-Distance (Feet) 15  -      Functional Mobility- Comment pt agreeable to go to sink and back to bed d/t pain  -AC      Recorded by [AC] Mercedes Shah OT      Upper Body Bathing Assessment/Training    UB Bathing Assess/Train, Position standing;sink side  -AC      UB Bathing Assess/Train, [place] Level set up required;supervision required  -AC      Recorded by [AC] Mercedes Shah OT      Lower Body Bathing Assessment/Training    LB Bathing Assess/Train, Position standing;sitting  -AC      LB Bathing Assess/Train, [place] Level moderate assist (50% patient effort)  -      LB Bathing Assess/Train, Comment pt able to wash upper legs and bottom, unable to use cross leg technique to wash lower legs  -AC      Recorded by [AC] Mercedes Shah OT      Lower Body Dressing Assessment/Training    LB Dressing Assess/Train, Clothing Type donning:;slipper socks   undergarment  -      LB Dressing Assess/Train, Position standing;sitting  -AC      LB Dressing Assess/Train, [place] verbal cues required  -AC      LB  Dressing Assess/Train, Comment max assist to don socks as pt unable to complete using crossleg technique and deferred use of sockaide d/t cognition, mod for undergarment , pt able to pullup over knees and hips  -AC      Recorded by [AC] Mercedes Shah OT      Toileting Assessment/Training    Toileting Assess/Train, Assistive Device bedside commode  -AC      Toileting Assess/Train, Position sitting;standing  -AC      Toileting Assess/Train, Indepen Level supervision required;set up required  -      Toileting Assess/Train, Comment pt completed hygiene and clothing mgmt with setup supervision  -AC      Recorded by [AC] Mercedes Shah OT      Grooming Assessment/Training    Grooming Assess/Train, Position standing  -AC      Grooming Assess/Train, Indepen Level supervision required  -AC      Grooming Assess/Train, Comment pt washed face, combed hair and brushed teeth given supervision.    -AC      Recorded by [AC] Mercedes Shah, OT      Balance Skills Training    Training Strategies (Balance Skills)  Refused balance activities and LE exercise - states she is too sore   -     Sitting-Level of Assistance   Close supervision  -    Sitting-Balance Support   Right upper extremity supported;Left upper extremity supported  -    Standing-Level of Assistance Contact guard  -AC  Contact guard  -    Static Standing Balance Support Right upper extremity supported;Left upper extremity supported  -AC  assistive device  -    Standing-Balance Activities --   bathing and grooming  -      Standing Balance # of Minutes 20  -AC      Gait Balance-Level of Assistance   Minimum assistance  -    Gait Balance Support   assistive device  -SJ    Recorded by [AC] Mercedes Shah, OT [KL] Vesta Wilson, PT [SJ] Valentina Corona, PT    Therapy Exercises    Bilateral Lower Extremities  --   refused  -KL     Recorded by  [KL] Vesta Wilson, PT     Positioning and Restraints    Pre-Treatment Position in bed  -AC in bed  -KL in bed   -SJ    Post Treatment Position bed  -AC bed  -KL chair  -SJ    In Bed supine;call light within reach;encouraged to call for assist;exit alarm on;notified nsg  -AC notified nsg;supine;call light within reach;encouraged to call for assist;exit alarm on;with family/caregiver   Pt instructed to stay in bed unless RN is notified   -KL     In Chair  --   exit alarm was turned on  -KL notified nsg;reclined;call light within reach;encouraged to call for assist;exit alarm on;heels elevated  -SJ    Recorded by [AC] Mercedes Shah, OT [KL] Vesta Wilson, PT [SJ] Valentina Corona, PT      08/18/17 1430          Rehab Assessment/Intervention    Discipline occupational therapist  -CL      Document Type therapy note (daily note)  -CL      Subjective Information agree to therapy;no complaints  -CL      Patient Effort, Rehab Treatment good  -CL      Precautions/Limitations fall precautions;spinal precautions;other (see comments)   s/p TLIF on 07/05/17, exit alarm, residual R sided weakness  -CL      Recorded by [CL] Mary Kate Boyle OT      Vital Signs    Pre Systolic BP Rehab 154  -CL      Pre Treatment Diastolic BP 52  -CL      Post Systolic BP Rehab 150  -CL      Post Treatment Diastolic BP 88  -CL      Pretreatment Heart Rate (beats/min) 94  -CL      Posttreatment Heart Rate (beats/min) 99  -CL      Pre SpO2 (%) 98  -CL      O2 Delivery Pre Treatment room air  -CL      Post SpO2 (%) 98  -CL      O2 Delivery Post Treatment room air  -CL      Pre Patient Position Supine  -CL      Intra Patient Position Standing  -CL      Post Patient Position Sitting  -CL      Recorded by [CL] Mary Kate Boyle OT      Pain Assessment    Pain Assessment No/denies pain  -CL      Recorded by [CL] Mary Kate Boyle OT      Cognitive Assessment/Intervention    Current Cognitive/Communication Assessment impaired  -CL      Orientation Status oriented x 4  -CL      Follows Commands/Answers Questions 75% of the time;needs cueing;needs increased time;needs  repetition  -CL      Personal Safety moderate impairment;decreased awareness, need for assist;decreased awareness, need for safety;decreased insight to deficits;severe impairment;impulsive   Pt could not recall spinal precautions, tried to bend/twist  -CL      Personal Safety Interventions gait belt;fall prevention program maintained;nonskid shoes/slippers when out of bed  -CL      Recorded by [CL] Mary Kate Boyle OT      Bed Mobility, Assessment/Treatment    Bed Mob, Supine to Sit, [place] supervision required;verbal cues required  -CL      Bed Mob, Sit to Supine, [place] not tested  -CL      Bed Mobility, Comment Pt sat up into long-sitting impulsively in bed prior to cueing for log-roll technique, however required no assist.   -CL      Recorded by [CL] Mary Kate Boyle OT      Transfer Assessment/Treatment    Transfers, Sit-Stand [place] contact guard assist;2 person assist required;verbal cues required  -CL      Transfers, Stand-Sit [place] contact guard assist;2 person assist required;verbal cues required  -CL      Transfers, Sit-Stand-Sit, Assist Device rolling walker  -CL      Toilet Transfer, [place] contact guard assist;2 person assist required;verbal cues required  -CL      Toilet Transfer, Assistive Device rolling walker;bedside commode without drop arms  -CL      Transfer, Comment VCs for HP/sequencing.   -CL      Recorded by [CL] Mary Kate Boyle OT      Functional Mobility    Functional Mobility- Ind. Level minimum assist (75% patient effort);2 person assist required;verbal cues required  -CL      Functional Mobility- Device rolling walker  -CL      Functional Mobility-Distance (Feet) 250  -CL      Functional Mobility- Comment Pt required constant assist for RW management d/t L lateral drifting and pt keeping RW too far in front. Pt will impulsively let go of RW and demo episodes of LOB.   -CL      Recorded by [CL] Mary Kate Boyle OT      Lower Body Dressing Assessment/Training    LB  Dressing Assess/Train, Clothing Type donning:;socks  -CL      LB Dressing Assess/Train, [place] verbal cues required  -CL      LB Dressing Assess/Train, Comment Pt attempted to bend forward to don socks while in long-sitting in bed. Pt educated that this is not safe per spinal precautions, educated on appropriate technique though pt declined to perform.    -CL      Recorded by [CL] Mary Kate Boyle OT      Toileting Assessment/Training    Toileting Assess/Train, Position sitting  -CL      Toileting Assess/Train, Indepen Level set up required;verbal cues required  -CL      Toileting Assess/Train, Comment Pt required VCs for attention to task, though required no assist.   -CL      Recorded by [CL] Mary Kate Boyle OT      Grooming Assessment/Training    Grooming Assess/Train, Position sitting  -CL      Grooming Assess/Train, Indepen Level set up required  -CL      Grooming Assess/Train, Comment Pt washed face w/ wipe.   -CL      Recorded by [CL] Mary Kate Boyle OT      Motor Skills/Interventions    Additional Documentation Balance Skills Training (Group)  -CL      Recorded by [CL] Mary Kate Boyle OT      Balance Skills Training    Sitting-Level of Assistance Close supervision  -CL      Sitting-Balance Support Right upper extremity supported;Left upper extremity supported;Feet supported  -CL      Sitting-Balance Activities Forward lean;Reaching for objects;Trunk control activities  -CL      Standing-Level of Assistance Contact guard  -CL      Static Standing Balance Support assistive device  -CL      Standing-Balance Activities Weight Shift A-P;Weight Shift R-L  -CL      Gait Balance-Level of Assistance Minimum assistance;x2  -CL      Gait Balance Support assistive device  -CL      Gait Balance Activities scanning environment R/L;side-stepping  -CL      Recorded by [CL] Mary Kate Boyle OT      Positioning and Restraints    Pre-Treatment Position in bed  -CL      Post Treatment Position chair  -CL      In Chair notified  nsg;reclined;call light within reach;encouraged to call for assist;exit alarm on;waffle cushion;legs elevated;heels elevated  -CL      Recorded by [CL] Mary Kate Boyle OT        User Key  (r) = Recorded By, (t) = Taken By, (c) = Cosigned By    Initials Name Effective Dates    AC Mercedes Shah, OT 06/23/15 -     SJ Valentina Jeter, PT 06/19/15 -     KL Vesta Wilson, PT 05/18/15 -     CL Mary Kate Boyle, OT 06/08/16 -                 OT Goals       08/21/17 0936 08/18/17 1521 08/16/17 1547    Transfer Training OT LTG    Transfer Training OT LTG, Date Established   08/16/17  -CL    Transfer Training OT LTG, Time to Achieve   by discharge  -CL    Transfer Training OT LTG, Activity Type   bed to chair /chair to bed;toilet;sit to stand/stand to sit  -CL    Transfer Training OT LTG, [place] Level   contact guard assist  -CL    Transfer Training OT LTG, Additional Goal   AAD  -CL    Transfer Training OT LTG, Outcome goal met  -AC goal ongoing  -CL     Patient Education OT LTG    Patient Education OT LTG, Date Established   08/16/17  -CL    Patient Education OT LTG, Time to Achieve   by discharge  -CL    Patient Education OT LTG, Education Type   written program;HEP;posture/body mechanics;home safety;adaptive equipment mgmt;energy conservation  -CL    Patient Education OT LTG, Education Understanding   verbalizes understanding  -CL    Patient Education OT LTG, Additional Goal   Pt will accurately recall spinal precautions.   -CL    Patient Education OT LTG Outcome goal ongoing  -AC goal ongoing  -CL     Orientation OT LTG    Orientation OT LTG, Date Established   08/16/17  -CL    Orientation OT LTG, Time to Achieve   by discharge  -CL    Orientation OT LTG, Activity Type   person;place;time;100% treatment session  -CL    Orientation OT LTG, Outcome  goal met  -CL     LB Dressing OT LTG    LB Dressing Goal OT LTG, Date Established   08/16/17  -CL    LB Dressing Goal OT LTG, Time to Achieve   by discharge  -CL    LB  Dressing Goal OT LTG, Activity Type   Don socks/pants per spinal precautions  -CL    LB Dressing Goal OT LTG, [place] Level   contact guard assist  -CL    LB Dressing Goal OT LTG, Additional Goal   AAD  -CL    LB Dressing Goal OT LTG, Outcome goal ongoing  -AC goal ongoing  -CL       User Key  (r) = Recorded By, (t) = Taken By, (c) = Cosigned By    Initials Name Provider Type    AC Mercedes Shah, OT Occupational Therapist    MANJEET Boyle, OT Occupational Therapist          Occupational Therapy Education     Title: PT OT SLP Therapies (Active)     Topic: Occupational Therapy (Active)     Point: ADL training (Active)    Description: Instruct learner(s) on proper safety adaptation and remediation techniques during self care or transfers.   Instruct in proper use of assistive devices.    Learning Progress Summary    Learner Readiness Method Response Comment Documented by Status   Patient Acceptance E NR reviewed spinal precautions, ADL training  08/21/17 0935 Active    Acceptance E,D NR Pt educated on appropriate safety precautions, t/f techniques, spinal precautions, adaptive dressing techniques, and benefits of therapy.  08/18/17 1521 Active    Acceptance E,D NR Pt educated on appropriate safety precautions, spinal precautions, t/f techniques, log roll technique, and benefits of therapy.  08/16/17 1546 Active               Point: Precautions (Active)    Description: Instruct learner(s) on prescribed precautions during self-care and functional transfers.    Learning Progress Summary    Learner Readiness Method Response Comment Documented by Status   Patient Acceptance E,D NR Pt educated on appropriate safety precautions, t/f techniques, spinal precautions, adaptive dressing techniques, and benefits of therapy.  08/18/17 1521 Active    Acceptance E,D NR Pt educated on appropriate safety precautions, spinal precautions, t/f techniques, log roll technique, and benefits of therapy.  08/16/17 1546 Active                Point: Body mechanics (Active)    Description: Instruct learner(s) on proper positioning and spine alignment during self-care, functional mobility activities and/or exercises.    Learning Progress Summary    Learner Readiness Method Response Comment Documented by Status   Patient Acceptance E,D NR Pt educated on appropriate safety precautions, t/f techniques, spinal precautions, adaptive dressing techniques, and benefits of therapy.  08/18/17 1521 Active    Acceptance E,D NR Pt educated on appropriate safety precautions, spinal precautions, t/f techniques, log roll technique, and benefits of therapy.  08/16/17 1546 Active                      User Key     Initials Effective Dates Name Provider Type Discipline     06/23/15 -  Mercedes Shah, OT Occupational Therapist OT    CL 06/08/16 -  Mary Kate Boyle OT Occupational Therapist OT                  OT Recommendation and Plan  Anticipated Equipment Needs At Discharge:  (TBA further)  Anticipated Discharge Disposition: inpatient rehabilitation facility  Planned Therapy Interventions: adaptive equipment training, ADL retraining, balance training, energy conservation, bed mobility training, home exercise program, transfer training  Therapy Frequency: daily  Plan of Care Review  Plan Of Care Reviewed With: patient  Progress: progress towards functional goals is fair  Outcome Summary/Follow up Plan: Pt has met transfer goal and is increasing independence with toileting, bathing and grooming, but requiring multiple cues for safety and to  maintain spinal precautions        Outcome Measures       08/21/17 0840 08/18/17 1432 08/18/17 1430    How much help from another person do you currently need...    Turning from your back to your side while in flat bed without using bedrails?  4  -SJ     Moving from lying on back to sitting on the side of a flat bed without bedrails?  4  -SJ     Moving to and from a bed to a chair (including a wheelchair)?  3  -SJ     Standing  up from a chair using your arms (e.g., wheelchair, bedside chair)?  3  -SJ     Climbing 3-5 steps with a railing?  3  -SJ     To walk in hospital room?  3  -SJ     AM-PAC 6 Clicks Score  20  -SJ     How much help from another is currently needed...    Putting on and taking off regular lower body clothing? 2  -AC  2  -CL    Bathing (including washing, rinsing, and drying) 2  -AC  2  -CL    Toileting (which includes using toilet bed pan or urinal) 3  -AC  4  -CL    Putting on and taking off regular upper body clothing 3  -AC  3  -CL    Taking care of personal grooming (such as brushing teeth) 4  -AC  3  -CL    Eating meals 4  -AC  4  -CL    Score 18  -AC  18  -CL    Functional Assessment    Outcome Measure Options AM-PAC 6 Clicks Daily Activity (OT)  -AC AM-PAC 6 Clicks Basic Mobility (PT)  -SJ AM-PAC 6 Clicks Daily Activity (OT)  -CL      User Key  (r) = Recorded By, (t) = Taken By, (c) = Cosigned By    Initials Name Provider Type     Mercedes Shah, OT Occupational Therapist    NISHANT Corona, PT Physical Therapist    CL Mary Kate Boyle, OT Occupational Therapist           Time Calculation:         Time Calculation- OT       08/21/17 0940          Time Calculation- OT    OT Start Time 0840  -      Total Timed Code Minutes- OT 40 minute(s)  -      OT Received On 08/21/17  -      OT Goal Re-Cert Due Date 08/26/17  -        User Key  (r) = Recorded By, (t) = Taken By, (c) = Cosigned By    Initials Name Provider Type     Mercedes Shah OT Occupational Therapist           Therapy Charges for Today     Code Description Service Date Service Provider Modifiers Qty    [id number]  OT THERAPEUTIC ACT EA 15 MIN 8/21/2017 Mercedes Shah OT GO 3               Mercedes Shah OT  8/21/2017

## 2017-08-21 NOTE — PLAN OF CARE
Problem: Patient Care Overview (Adult)  Goal: Plan of Care Review  Outcome: Ongoing (interventions implemented as appropriate)    08/21/17 7199   Coping/Psychosocial Response Interventions   Plan Of Care Reviewed With patient;spouse   Patient Care Overview   Progress progress toward functional goals as expected   Outcome Evaluation   Outcome Summary/Follow up Plan Pt doing better today. Less agitated. Home vs rehab at discharge.          Problem: Diabetes, Type 2 (Adult)  Goal: Signs and Symptoms of Listed Potential Problems Will be Absent or Manageable (Diabetes, Type 2)  Outcome: Ongoing (interventions implemented as appropriate)    Problem: Fluid Volume Deficit (Adult)  Goal: Fluid/Electrolyte Balance  Outcome: Ongoing (interventions implemented as appropriate)  Goal: Comfort/Well Being  Outcome: Ongoing (interventions implemented as appropriate)    Problem: Pressure Ulcer Risk (Ivan Scale) (Adult,Obstetrics,Pediatric)  Goal: Skin Integrity  Outcome: Ongoing (interventions implemented as appropriate)    Problem: Fall Risk (Adult)  Goal: Identify Related Risk Factors and Signs and Symptoms  Outcome: Ongoing (interventions implemented as appropriate)  Goal: Absence of Falls  Outcome: Ongoing (interventions implemented as appropriate)

## 2017-08-21 NOTE — PLAN OF CARE
Problem: Patient Care Overview (Adult)  Goal: Plan of Care Review  Outcome: Ongoing (interventions implemented as appropriate)    08/21/17 0936   Coping/Psychosocial Response Interventions   Plan Of Care Reviewed With patient   Patient Care Overview   Progress progress towards functional goals is fair   Outcome Evaluation   Outcome Summary/Follow up Plan Pt has met transfer goal and is increasing independence with toileting, bathing and grooming, but requiring multiple cues for safety and to maintain spinal precautions         Problem: Inpatient Occupational Therapy  Goal: Transfer Training Goal 1 LTG- OT  Outcome: Outcome(s) achieved Date Met:  08/21/17 08/16/17 1547 08/21/17 0936   Transfer Training OT LTG   Transfer Training OT LTG, Date Established 08/16/17 --    Transfer Training OT LTG, Time to Achieve by discharge --    Transfer Training OT LTG, Activity Type bed to chair /chair to bed;toilet;sit to stand/stand to sit --    Transfer Training OT LTG, [place] Level contact guard assist --    Transfer Training OT LTG, Additional Goal AAD --    Transfer Training OT LTG, Outcome --  goal met       Goal: Patient Education Goal LTG- OT  Outcome: Ongoing (interventions implemented as appropriate)    08/16/17 1547 08/21/17 0936   Patient Education OT LTG   Patient Education OT LTG, Date Established 08/16/17 --    Patient Education OT LTG, Time to Achieve by discharge --    Patient Education OT LTG, Education Type written program;HEP;posture/body mechanics;home safety;adaptive equipment mgmt;energy conservation --    Patient Education OT LTG, Education Understanding verbalizes understanding --    Patient Education OT LTG, Additional Goal Pt will accurately recall spinal precautions.  --    Patient Education OT LTG Outcome --  goal ongoing       Goal: LB Dressing Goal LTG- OT  Outcome: Ongoing (interventions implemented as appropriate)    08/16/17 1547 08/21/17 0936   LB Dressing OT LTG   LB Dressing Goal OT  LTG, Date Established 08/16/17 --    LB Dressing Goal OT LTG, Time to Achieve by discharge --    LB Dressing Goal OT LTG, Activity Type Don socks/pants per spinal precautions --    LB Dressing Goal OT LTG, [place] Level contact guard assist --    LB Dressing Goal OT LTG, Additional Goal AAD --    LB Dressing Goal OT LTG, Outcome --  goal ongoing

## 2017-08-21 NOTE — PLAN OF CARE
Problem: Patient Care Overview (Adult)  Goal: Plan of Care Review  Outcome: Ongoing (interventions implemented as appropriate)    08/21/17 1146   Coping/Psychosocial Response Interventions   Plan Of Care Reviewed With patient   Patient Care Overview   Progress progress toward functional goals as expected   Outcome Evaluation   Outcome Summary/Follow up Plan Felling better. Mobility improving. Ambulating in hallway.         Problem: Inpatient Physical Therapy  Goal: Bed Mobility Goal LTG- PT  Outcome: Ongoing (interventions implemented as appropriate)    08/16/17 1556 08/17/17 1318   Bed Mobility PT LTG   Bed Mobility PT LTG, Date Established 08/16/17 --    Bed Mobility PT LTG, Time to Achieve 2 wks --    Bed Mobility PT LTG, Activity Type roll left/roll right;supine to sit/sit to supine --    Bed Mobility PT LTG, [place] Level supervision required --    Bed Mobility PT LTG, Outcome --  goal ongoing       Goal: Transfer Training Goal 1 LTG- PT  Outcome: Ongoing (interventions implemented as appropriate)    08/16/17 1556 08/17/17 1318   Transfer Training PT LTG   Transfer Training PT LTG, Date Established 08/16/17 --    Transfer Training PT LTG, Time to Achieve 2 wks --    Transfer Training PT LTG, Activity Type bed to chair /chair to bed;sit to stand/stand to sit --    Transfer Training PT LTG, [place] Level supervision required --    Transfer Training PT LTG, Assist Device walker, rolling --    Transfer Training PT LTG, Outcome --  goal ongoing       Goal: Gait Training Goal LTG- PT  Outcome: Ongoing (interventions implemented as appropriate)    08/16/17 1556 08/17/17 1318   Gait Training PT LTG   Gait Training Goal PT LTG, Date Established 08/16/17 --    Gait Training Goal PT LTG, Time to Achieve 2 wks --    Gait Training Goal PT LTG, [place] Level contact guard assist --    Gait Training Goal PT LTG, Assist Device walker, rolling --    Gait Training Goal PT LTG, Distance to Achieve 500 --    Gait  Training Goal PT LTG, Outcome --  goal ongoing

## 2017-08-21 NOTE — DISCHARGE PLACEMENT REQUEST
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 Physical Therapy Treatment Note   Kevin     Patient Name: Paulina Johnson  : 1945  MRN: [id number]  Today's Date: 2017  Onset of Illness/Injury or Date of Surgery Date: 08/15/17  Date of Referral to PT: 08/15/17  Referring Physician: MD Ishan    Admit Date: 8/15/2017    Visit Dx:    ICD-10-CM ICD-9-CM   1. Type 2 diabetes mellitus with ketoacidosis without coma, unspecified long term insulin use status E13.10 250.12   2. Nausea and vomiting, intractability of vomiting not specified, unspecified vomiting type R11.2 787.01   3. Confusion R41.0 298.9   4. PANFILO (acute kidney injury) N17.9 584.9   5. Impaired mobility and ADLs Z74.09 799.89   6. Impaired functional mobility, balance, gait, and endurance Z74.09 V49.89     Patient Active Problem List   Diagnosis   • S/P right L4-5 laminotomy with medial facetectomy, foraminotomy and L4-5 diskectomy   • Hypothyroid   • HTN (hypertension)   • Hyperlipidemia   • H/O: stroke   • Right leg pain   • Status post lumbar spinal fusion   • Lumbar stenosis with neurogenic claudication   • Recurrent herniation of lumbar disc   • status post dural rent   • Type 2 diabetes mellitus with  ketoacidosis without coma   • Acute on chronic kidney failure   • Non-intractable vomiting with nausea               Adult Rehabilitation Note       08/20/17 1410 08/18/17 1432 08/18/17 1430    Rehab Assessment/Intervention    Discipline physical therapist  -KL physical therapist  -SJ occupational therapist  -CL    Document Type therapy note (daily note)  -KL  therapy note (daily note)  -CL    Subjective Information agree to therapy;complains of;pain  -KL no complaints;agree to therapy  -SJ agree to therapy;no complaints  -CL    Patient Effort, Rehab Treatment adequate  -KL good  -SJ good  -CL    Symptoms Noted During/After Treatment  none  -SJ     Precautions/Limitations  fall precautions;spinal precautions   lumbar spinal precautions  -SJ fall precautions;spinal precautions;other (see comments)   s/p TLIF on 07/05/17, exit alarm, residual R sided weakness  -CL    Recorded by [KL] Vesta Wilson, PT [SJ] Valentina Corona, PT [CL] Mary Kate Boyle OT    Vital Signs    Pre Systolic BP Rehab   154  -CL    Pre Treatment Diastolic BP   52  -CL    Post Systolic BP Rehab  150  -  -CL    Post Treatment Diastolic BP  88  -SJ 88  -CL    Pretreatment Heart Rate (beats/min)  95  -SJ 94  -CL    Posttreatment Heart Rate (beats/min)  101  -SJ 99  -CL    Pre SpO2 (%)   98  -CL    O2 Delivery Pre Treatment   room air  -CL    Post SpO2 (%)  100  -SJ 98  -CL    O2 Delivery Post Treatment  room air  -SJ room air  -CL    Pre Patient Position   Supine  -CL    Intra Patient Position   Standing  -CL    Post Patient Position   Sitting  -CL    Recorded by  [SJ] Valentina Corona, PT [CL] Mary Kate Boyle OT    Pain Assessment    Pain Assessment 0-10  -KL No/denies pain  -SJ No/denies pain  -CL    Pain Score 10  -KL 0  -SJ     Post Pain Score 8  -KL 0  -SJ     Pain Location Back  -KL      Recorded by [KL] Vesta Wilson, PT [SJ] Valentina Corona, PT [CL] Mary Kate Boyle OT    Cognitive Assessment/Intervention    Current  Cognitive/Communication Assessment impaired  -KL impaired  -SJ impaired  -CL    Orientation Status  oriented to;person  -SJ oriented x 4  -CL    Follows Commands/Answers Questions 75% of the time;able to follow single-step instructions  -KL 75% of the time;able to follow single-step instructions;needs cueing  -SJ 75% of the time;needs cueing;needs increased time;needs repetition  -CL    Personal Safety  unaware of cognitive deficits;decreased awareness, need for safety;decreased insight to deficits  -SJ moderate impairment;decreased awareness, need for assist;decreased awareness, need for safety;decreased insight to deficits;severe impairment;impulsive   Pt could not recall spinal precautions, tried to bend/twist  -CL    Personal Safety Interventions  fall prevention program maintained;gait belt;muscle strengthening facilitated;nonskid shoes/slippers when out of bed  -SJ gait belt;fall prevention program maintained;nonskid shoes/slippers when out of bed  -CL    Recorded by [KL] Vesta Wilson, PT [SJ] Valentina Corona, PT [CL] Mary Kate Boyle OT    Bed Mobility, Assessment/Treatment    Bed Mob, Supine to Sit, [place] verbal cues required;contact guard assist  -KL conditional independence  -SJ supervision required;verbal cues required  -CL    Bed Mob, Sit to Supine, [place] verbal cues required;contact guard assist  -KL  not tested  -CL    Bed Mobility, Comment  Pt impulsively sat up without concern for spinal precautions.   -SJ Pt sat up into long-sitting impulsively in bed prior to cueing for log-roll technique, however required no assist.   -CL    Recorded by [KL] Vesta Wilson, PT [SJ] Valentina Corona, PT [CL] Mary Kate Boyle OT    Transfer Assessment/Treatment    Transfers, Sit-Stand [place] verbal cues required;contact guard assist  -KL contact guard assist;2 person assist required;verbal cues required  -SJ contact guard assist;2 person assist required;verbal cues required  -CL    Transfers,  Stand-Sit [place] verbal cues required;contact guard assist  -KL contact guard assist;verbal cues required  -SJ contact guard assist;2 person assist required;verbal cues required  -CL    Transfers, Sit-Stand-Sit, Assist Device  rolling walker  -SJ rolling walker  -CL    Toilet Transfer, [place]  contact guard assist  -SJ contact guard assist;2 person assist required;verbal cues required  -CL    Toilet Transfer, Assistive Device  bedside commode without drop arms;rolling walker  -SJ rolling walker;bedside commode without drop arms  -CL    Transfer, Safety Issues  weight-shifting ability decreased;impulsivity  -SJ     Transfer, Impairments  strength decreased;impaired balance  -SJ     Transfer, Comment  Decreased safety awareness, cues for hand placement  -SJ VCs for HP/sequencing.   -CL    Recorded by [KL] Vesta Wilson, PT [SJ] Valentina Corona PT [CL] Mary Kate Boyle OT    Gait Assessment/Treatment    Gait, [place] Level contact guard assist  -KL minimum assist (75% patient effort);1 person + 1 person to manage equipment;verbal cues required  -SJ     Gait, Assistive Device rolling walker  -KL rolling walker  -SJ     Gait, Distance (Feet) 300  -  -SJ     Gait, Gait Pattern Analysis  swing-through gait  -SJ     Gait, Gait Deviations  lin decreased;decreased heel strike;double stance time increased;forward flexed posture  -SJ     Gait, Safety Issues  step length decreased;balance decreased during turns  -SJ     Gait, Impairments  strength decreased;impaired balance;coordination impaired  -SJ     Gait, Comment impulsive, continuous VC's to stay inside walker  -KL Pt required Jerome for RW to steer straight and Jerome to correct during LOB.   -SJ     Recorded by [KL] Vesta Wilson, PT [SJ] Valentina Corona, PT     Functional Mobility    Functional Mobility- Ind. Level   minimum assist (75% patient effort);2 person assist required;verbal cues required  -CL    Functional Mobility- Device    rolling walker  -CL    Functional Mobility-Distance (Feet)   250  -CL    Functional Mobility- Comment   Pt required constant assist for RW management d/t L lateral drifting and pt keeping RW too far in front. Pt will impulsively let go of RW and demo episodes of LOB.   -CL    Recorded by   [CL] Mary Kate Boyle OT    Lower Body Dressing Assessment/Training    LB Dressing Assess/Train, Clothing Type   donning:;socks  -CL    LB Dressing Assess/Train, [place]   verbal cues required  -CL    LB Dressing Assess/Train, Comment   Pt attempted to bend forward to don socks while in long-sitting in bed. Pt educated that this is not safe per spinal precautions, educated on appropriate technique though pt declined to perform.    -CL    Recorded by   [CL] Mary Kate Boyle OT    Toileting Assessment/Training    Toileting Assess/Train, Position   sitting  -CL    Toileting Assess/Train, Indepen Level   set up required;verbal cues required  -CL    Toileting Assess/Train, Comment   Pt required VCs for attention to task, though required no assist.   -CL    Recorded by   [CL] Mary Kate Boyle OT    Grooming Assessment/Training    Grooming Assess/Train, Position   sitting  -CL    Grooming Assess/Train, Indepen Level   set up required  -CL    Grooming Assess/Train, Comment   Pt washed face w/ wipe.   -CL    Recorded by   [CL] Mary Kate Boyle OT    Motor Skills/Interventions    Additional Documentation   Balance Skills Training (Group)  -CL    Recorded by   [CL] Mary Kate Boyle OT    Balance Skills Training    Training Strategies (Balance Skills) Refused balance activities and LE exercise - states she is too sore   -KL      Sitting-Level of Assistance  Close supervision  -SJ Close supervision  -CL    Sitting-Balance Support  Right upper extremity supported;Left upper extremity supported  -SJ Right upper extremity supported;Left upper extremity supported;Feet supported  -CL    Sitting-Balance Activities   Forward lean;Reaching for objects;Trunk control  activities  -CL    Standing-Level of Assistance  Contact guard  -SJ Contact guard  -CL    Static Standing Balance Support  assistive device  -SJ assistive device  -CL    Standing-Balance Activities   Weight Shift A-P;Weight Shift R-L  -CL    Gait Balance-Level of Assistance  Minimum assistance  -SJ Minimum assistance;x2  -CL    Gait Balance Support  assistive device  -SJ assistive device  -CL    Gait Balance Activities   scanning environment R/L;side-stepping  -CL    Recorded by [KL] Vesta Wilson, PT [SJ] Valentina Corona, PT [CL] Mary Kate Boyle OT    Therapy Exercises    Bilateral Lower Extremities --   refused  -KL      Recorded by [KL] Vesta Wilson PT      Positioning and Restraints    Pre-Treatment Position in bed  -KL in bed  -SJ in bed  -CL    Post Treatment Position bed  -KL chair  -SJ chair  -CL    In Bed notified nsg;supine;call light within reach;encouraged to call for assist;exit alarm on;with family/caregiver   Pt instructed to stay in bed unless RN is notified   -KL      In Chair --   exit alarm was turned on  -KL notified nsg;reclined;call light within reach;encouraged to call for assist;exit alarm on;heels elevated  -SJ notified nsg;reclined;call light within reach;encouraged to call for assist;exit alarm on;waffle cushion;legs elevated;heels elevated  -CL    Recorded by [KL] Vesta Wilson, PT [SJ] Valentina Corona, PT [CL] Mary Kate Boyle OT      User Key  (r) = Recorded By, (t) = Taken By, (c) = Cosigned By    Initials Name Effective Dates     Valentina Corona, PT 06/19/15 -     KEILY Wilson, PT 05/18/15 -     CL Mary Kate Boyle, OT 06/08/16 -                 IP PT Goals       08/17/17 1318 08/16/17 1556       Bed Mobility PT LTG    Bed Mobility PT LTG, Date Established  08/16/17  -     Bed Mobility PT LTG, Time to Achieve  2 wks  -     Bed Mobility PT LTG, Activity Type  roll left/roll right;supine to sit/sit to supine  -     Bed Mobility PT LTG, [place] Level   supervision required  -SJ     Bed Mobility PT LTG, Outcome goal ongoing  -DELORES (r) KR (t) DELORES (c) goal ongoing  -SJ     Transfer Training PT LTG    Transfer Training PT LTG, Date Established  08/16/17  -SJ     Transfer Training PT LTG, Time to Achieve  2 wks  -SJ     Transfer Training PT LTG, Activity Type  bed to chair /chair to bed;sit to stand/stand to sit  -SJ     Transfer Training PT LTG, [place] Level  supervision required  -SJ     Transfer Training PT LTG, Assist Device  walker, rolling  -SJ     Transfer Training PT LTG, Outcome goal ongoing  -DELORES (r) KR (t) DELORES (c) goal ongoing  -SJ     Gait Training PT LTG    Gait Training Goal PT LTG, Date Established  08/16/17  -SJ     Gait Training Goal PT LTG, Time to Achieve  2 wks  -SJ     Gait Training Goal PT LTG, [place] Level  contact guard assist  -SJ     Gait Training Goal PT LTG, Assist Device  walker, rolling  -SJ     Gait Training Goal PT LTG, Distance to Achieve  500  -SJ     Gait Training Goal PT LTG, Outcome goal ongoing  -DELORES (r) KR (t) DELORES (c) goal ongoing  -SJ       User Key  (r) = Recorded By, (t) = Taken By, (c) = Cosigned By    Initials Name Provider Type    DELORES Motta, PT Physical Therapist    NISHANT Corona, PT Physical Therapist    NEISHA Naqvi, PT Student PT Student          Physical Therapy Education     Title: PT OT SLP Therapies (Active)     Topic: Physical Therapy (Active)     Point: Mobility training (Active)    Learning Progress Summary    Learner Readiness Method Response Comment Documented by Status   Patient Acceptance E NR  KL 08/20/17 1521 Active    Acceptance E,D NR  SJ 08/18/17 1522 Active    Acceptance E NR  KR 08/17/17 1318 Active    Acceptance E NR  SJ 08/16/17 1559 Active               Point: Home exercise program (Active)    Learning Progress Summary    Learner Readiness Method Response Comment Documented by Status   Patient Acceptance E NR  KL 08/20/17 1521 Active    Acceptance E,D NR  SJ 08/18/17 1522  Active    Acceptance E NR   08/16/17 1559 Active               Point: Body mechanics (Active)    Learning Progress Summary    Learner Readiness Method Response Comment Documented by Status   Patient Acceptance E NR   08/20/17 1521 Active    Acceptance E,D NR   08/18/17 1522 Active    Acceptance E NR  KR 08/17/17 1318 Active    Acceptance E NR   08/16/17 1559 Active               Point: Precautions (Active)    Learning Progress Summary    Learner Readiness Method Response Comment Documented by Status   Patient Acceptance E NR   08/20/17 1521 Active    Acceptance E,D NR  SJ 08/18/17 1522 Active    Acceptance E NR  KR 08/17/17 1318 Active    Acceptance E NR   08/16/17 1559 Active                      User Key     Initials Effective Dates Name Provider Type Discipline     06/19/15 -  Valentina Corona, PT Physical Therapist PT     05/18/15 -  Vesta Wilson, PT Physical Therapist PT     05/30/17 -  Clotilde Naqvi, PT Student PT Student PT                    PT Recommendation and Plan  Anticipated Discharge Disposition: inpatient rehabilitation facility  Planned Therapy Interventions: balance training, bed mobility training, gait training, home exercise program, patient/family education, strengthening, transfer training  PT Frequency: daily  Plan of Care Review  Plan Of Care Reviewed With: patient, spouse  Progress: improving  Outcome Summary/Follow up Plan: Pt was able to tolerate treatment. Ambulation x 300 ft did decrease LE and back pain. Instructed to continue to move in bed via exercise.           Outcome Measures       08/18/17 1432 08/18/17 1430       How much help from another person do you currently need...    Turning from your back to your side while in flat bed without using bedrails? 4  -SJ      Moving from lying on back to sitting on the side of a flat bed without bedrails? 4  -SJ      Moving to and from a bed to a chair (including a wheelchair)? 3  -SJ      Standing up from a chair  using your arms (e.g., wheelchair, bedside chair)? 3  -SJ      Climbing 3-5 steps with a railing? 3  -SJ      To walk in hospital room? 3  -SJ      AM-PAC 6 Clicks Score 20  -SJ      How much help from another is currently needed...    Putting on and taking off regular lower body clothing?  2  -CL     Bathing (including washing, rinsing, and drying)  2  -CL     Toileting (which includes using toilet bed pan or urinal)  4  -CL     Putting on and taking off regular upper body clothing  3  -CL     Taking care of personal grooming (such as brushing teeth)  3  -CL     Eating meals  4  -CL     Score  18  -CL     Functional Assessment    Outcome Measure Options AM-PAC 6 Clicks Basic Mobility (PT)  -SJ AM-PAC 6 Clicks Daily Activity (OT)  -CL       User Key  (r) = Recorded By, (t) = Taken By, (c) = Cosigned By    Initials Name Provider Type    NISHANT Corona, PT Physical Therapist    CL Mary Kate Boyle, OT Occupational Therapist           Time Calculation:         PT Charges       08/20/17 1523          Time Calculation    Start Time 1410  -      Time Calculation- PT    Total Timed Code Minutes- PT 25 minute(s)  -        User Key  (r) = Recorded By, (t) = Taken By, (c) = Cosigned By    Initials Name Provider Type     Vesta Wilson PT Physical Therapist          Therapy Charges for Today     Code Description Service Date Service Provider Modifiers Qty    47503357312 HC PT THER SUPP EA 15 MIN 8/20/2017 Vesta Wilson, PT GP 2    51803411180 HC PT THER PROC EA 15 MIN 8/20/2017 Vesta Wilson, PT GP 1    18763379933 HC GAIT TRAINING EA 15 MIN 8/20/2017 Vesta Wilson, PT GP 1          PT G-Codes  Outcome Measure Options: AM-PAC 6 Clicks Basic Mobility (PT)    Vesta Wilson PT  8/20/2017            Electronically signed by Vesta Wilson PT at 8/20/2017  3:24 PM           Occupational Therapy Notes (most recent note)      Mercedes Shah, OT at 8/21/2017  9:41 AM  Version 1 of 1         Acute  Care - Occupational Therapy Treatment Note  [facility]     Patient Name: Paulina Johnson  : 1945  MRN: [id number]  Today's Date: 2017  Onset of Illness/Injury or Date of Surgery Date: 08/15/17  Date of Referral to OT: 08/15/17  Referring Physician: MD Ishan      Admit Date: 8/15/2017    Visit Dx:     ICD-10-CM ICD-9-CM   1. Type 2 diabetes mellitus with ketoacidosis without coma, unspecified long term insulin use status E13.10 250.12   2. Nausea and vomiting, intractability of vomiting not specified, unspecified vomiting type R11.2 787.01   3. Confusion R41.0 298.9   4. PANFILO (acute kidney injury) N17.9 584.9   5. Impaired mobility and ADLs Z74.09 799.89   6. Impaired functional mobility, balance, gait, and endurance Z74.09 V49.89     Patient Active Problem List   Diagnosis   • S/P right L4-5 laminotomy with medial facetectomy, foraminotomy and L4-5 diskectomy   • Hypothyroid   • HTN (hypertension)   • Hyperlipidemia   • H/O: stroke   • Right leg pain   • Status post lumbar spinal fusion   • Lumbar stenosis with neurogenic claudication   • Recurrent herniation of lumbar disc   • status post dural rent   • Type 2 diabetes mellitus with ketoacidosis without coma   • Acute on chronic kidney failure   • Non-intractable vomiting with nausea             Adult Rehabilitation Note       17 0840 17 1410 17 1432    Rehab Assessment/Intervention    Discipline occupational therapist  -AC physical therapist  -KL physical therapist  -SJ    Document Type therapy note (daily note)  -AC therapy note (daily note)  -KL     Subjective Information agree to therapy;complains of;pain  -AC agree to therapy;complains of;pain  -KL no complaints;agree to therapy  -SJ    Patient Effort, Rehab Treatment  adequate  -KL good  -SJ    Symptoms Noted During/After Treatment   none  -SJ    Precautions/Limitations fall precautions;spinal precautions  -AC  fall precautions;spinal precautions   lumbar spinal  precautions  -SJ    Recorded by [AC] Mercedes Shah, OT [KL] Vesta Wilson, PT [SJ] Valentina Corona, PT    Vital Signs    Post Systolic BP Rehab   150  -SJ    Post Treatment Diastolic BP   88  -SJ    Pretreatment Heart Rate (beats/min)   95  -SJ    Posttreatment Heart Rate (beats/min)   101  -SJ    Post SpO2 (%)   100  -SJ    O2 Delivery Post Treatment   room air  -SJ    Recorded by   [SJ] Valentina Corona, PT    Pain Assessment    Pain Assessment 0-10  -AC 0-10  -KL No/denies pain  -SJ    Pain Score 4  -AC 10  -KL 0  -SJ    Post Pain Score 6  -AC 8  -KL 0  -SJ    Pain Type Acute pain  -AC      Pain Location Back  -AC Back  -KL     Pain Orientation Left  -AC      Pain Intervention(s) Medication (See MAR);Repositioned  -AC      Recorded by [AC] Mercedes Shah, OT [KL] Vesta Wilson, PT [SJ] Valentina Corona, PT    Cognitive Assessment/Intervention    Current Cognitive/Communication Assessment impaired  -AC impaired  - impaired  -    Orientation Status oriented to;person  -AC  oriented to;person  -    Follows Commands/Answers Questions 75% of the time;needs cueing;needs repetition  -AC 75% of the time;able to follow single-step instructions  - 75% of the time;able to follow single-step instructions;needs cueing  -    Personal Safety decreased awareness, need for assist;decreased awareness, need for safety  -  unaware of cognitive deficits;decreased awareness, need for safety;decreased insight to deficits  -    Personal Safety Interventions fall prevention program maintained;gait belt;nonskid shoes/slippers when out of bed  -AC  fall prevention program maintained;gait belt;muscle strengthening facilitated;nonskid shoes/slippers when out of bed  -SJ    Recorded by [AC] Mercedes Shah, OT [KL] Vesta Wilson, PT [SJ] Valentina Corona, PT    Bed Mobility, Assessment/Treatment    Bed Mobility, Assistive Device bed rails;head of bed elevated  -AC      Bed Mob, Supine to Sit, [place] verbal cues  required;contact guard assist  -AC verbal cues required;contact guard assist  -KL conditional independence  -SJ    Bed Mob, Sit to Supine, [place] verbal cues required;contact guard assist  -AC verbal cues required;contact guard assist  -KL     Bed Mobility, Safety Issues decreased use of arms for pushing/pulling;decreased use of legs for bridging/pushing  -AC      Bed Mobility, Impairments strength decreased;impaired balance  -AC      Bed Mobility, Comment multiple cues to log roll  -AC  Pt impulsively sat up without concern for spinal precautions.   -SJ    Recorded by [AC] Mercedes Shah, OT [KL] Vesta Wilson, PT [SJ] Valentina Corona, PT    Transfer Assessment/Treatment    Transfers, Sit-Stand [place] verbal cues required;contact guard assist  -AC verbal cues required;contact guard assist  -KL contact guard assist;2 person assist required;verbal cues required  -SJ    Transfers, Stand-Sit [place] verbal cues required;contact guard assist  -AC verbal cues required;contact guard assist  -KL contact guard assist;verbal cues required  -SJ    Transfers, Sit-Stand-Sit, Assist Device rolling walker  -AC  rolling walker  -SJ    Toilet Transfer, [place] contact guard assist  -AC  contact guard assist  -SJ    Toilet Transfer, Assistive Device bedside commode without drop arms  -AC  bedside commode without drop arms;rolling walker  -SJ    Transfer, Safety Issues   weight-shifting ability decreased;impulsivity  -SJ    Transfer, Impairments strength decreased;impaired balance  -AC  strength decreased;impaired balance  -SJ    Transfer, Comment verbal cues for hand placement, to place brakes on rollator to sit and stand  -AC  Decreased safety awareness, cues for hand placement  -SJ    Recorded by [AC] Mercedes Shah, OT [KL] Vesta Wilson, PT [SJ] Valentina Corona, PT    Gait Assessment/Treatment    Gait, [place] Level  contact guard assist  -KL minimum assist (75% patient effort);1 person + 1  person to manage equipment;verbal cues required  -SJ    Gait, Assistive Device  rolling walker  -KL rolling walker  -SJ    Gait, Distance (Feet)  300  -  -SJ    Gait, Gait Pattern Analysis   swing-through gait  -SJ    Gait, Gait Deviations   lin decreased;decreased heel strike;double stance time increased;forward flexed posture  -SJ    Gait, Safety Issues   step length decreased;balance decreased during turns  -SJ    Gait, Impairments   strength decreased;impaired balance;coordination impaired  -    Gait, Comment  impulsive, continuous VC's to stay inside walker  - Pt required [place] for RW to steer straight and [place] to correct during LOB.   -SJ    Recorded by  [KL] Vesta Wilson, PT [] Valentina Corona, PT    Functional Mobility    Functional Mobility- Ind. Level verbal cues required;contact guard assist  -AC      Functional Mobility- Device rollator  -AC      Functional Mobility-Distance (Feet) 15  -      Functional Mobility- Comment pt agreeable to go to sink and back to bed d/t pain  -AC      Recorded by [AC] Mercedes Shah OT      Upper Body Bathing Assessment/Training    UB Bathing Assess/Train, Position standing;sink side  -AC      UB Bathing Assess/Train, [place] Level set up required;supervision required  -AC      Recorded by [AC] Mercedes Shah OT      Lower Body Bathing Assessment/Training    LB Bathing Assess/Train, Position standing;sitting  -AC      LB Bathing Assess/Train, [place] Level moderate assist (50% patient effort)  -      LB Bathing Assess/Train, Comment pt able to wash upper legs and bottom, unable to use cross leg technique to wash lower legs  -AC      Recorded by [AC] Mercedes Shah OT      Lower Body Dressing Assessment/Training    LB Dressing Assess/Train, Clothing Type donning:;slipper socks   undergarment  -      LB Dressing Assess/Train, Position standing;sitting  -AC      LB Dressing Assess/Train, [place] verbal cues required  -AC      LB  Dressing Assess/Train, Comment max assist to don socks as pt unable to complete using crossleg technique and deferred use of sockaide d/t cognition, mod for undergarment , pt able to pullup over knees and hips  -AC      Recorded by [AC] Mercedes Shah OT      Toileting Assessment/Training    Toileting Assess/Train, Assistive Device bedside commode  -AC      Toileting Assess/Train, Position sitting;standing  -AC      Toileting Assess/Train, Indepen Level supervision required;set up required  -      Toileting Assess/Train, Comment pt completed hygiene and clothing mgmt with setup supervision  -AC      Recorded by [AC] Mercedes Shah OT      Grooming Assessment/Training    Grooming Assess/Train, Position standing  -AC      Grooming Assess/Train, Indepen Level supervision required  -AC      Grooming Assess/Train, Comment pt washed face, combed hair and brushed teeth given supervision.    -AC      Recorded by [AC] Mercedes Shah, OT      Balance Skills Training    Training Strategies (Balance Skills)  Refused balance activities and LE exercise - states she is too sore   -     Sitting-Level of Assistance   Close supervision  -    Sitting-Balance Support   Right upper extremity supported;Left upper extremity supported  -    Standing-Level of Assistance Contact guard  -AC  Contact guard  -    Static Standing Balance Support Right upper extremity supported;Left upper extremity supported  -AC  assistive device  -    Standing-Balance Activities --   bathing and grooming  -      Standing Balance # of Minutes 20  -AC      Gait Balance-Level of Assistance   Minimum assistance  -    Gait Balance Support   assistive device  -SJ    Recorded by [AC] Mercedes Shah, OT [KL] Vesta Wilson, PT [SJ] Valentina Corona, PT    Therapy Exercises    Bilateral Lower Extremities  --   refused  -KL     Recorded by  [KL] Vesta Wilson, PT     Positioning and Restraints    Pre-Treatment Position in bed  -AC in bed  -KL in bed   -SJ    Post Treatment Position bed  -AC bed  -KL chair  -SJ    In Bed supine;call light within reach;encouraged to call for assist;exit alarm on;notified nsg  -AC notified nsg;supine;call light within reach;encouraged to call for assist;exit alarm on;with family/caregiver   Pt instructed to stay in bed unless RN is notified   -KL     In Chair  --   exit alarm was turned on  -KL notified nsg;reclined;call light within reach;encouraged to call for assist;exit alarm on;heels elevated  -SJ    Recorded by [AC] Mercedes Shah, OT [KL] Vesta Wilson, PT [SJ] Valentina Corona, PT      08/18/17 1430          Rehab Assessment/Intervention    Discipline occupational therapist  -CL      Document Type therapy note (daily note)  -CL      Subjective Information agree to therapy;no complaints  -CL      Patient Effort, Rehab Treatment good  -CL      Precautions/Limitations fall precautions;spinal precautions;other (see comments)   s/p TLIF on 07/05/17, exit alarm, residual R sided weakness  -CL      Recorded by [CL] Mary Kate Boyle OT      Vital Signs    Pre Systolic BP Rehab 154  -CL      Pre Treatment Diastolic BP 52  -CL      Post Systolic BP Rehab 150  -CL      Post Treatment Diastolic BP 88  -CL      Pretreatment Heart Rate (beats/min) 94  -CL      Posttreatment Heart Rate (beats/min) 99  -CL      Pre SpO2 (%) 98  -CL      O2 Delivery Pre Treatment room air  -CL      Post SpO2 (%) 98  -CL      O2 Delivery Post Treatment room air  -CL      Pre Patient Position Supine  -CL      Intra Patient Position Standing  -CL      Post Patient Position Sitting  -CL      Recorded by [CL] Mary Kate Boyle OT      Pain Assessment    Pain Assessment No/denies pain  -CL      Recorded by [CL] Mary Kate Boyle OT      Cognitive Assessment/Intervention    Current Cognitive/Communication Assessment impaired  -CL      Orientation Status oriented x 4  -CL      Follows Commands/Answers Questions 75% of the time;needs cueing;needs increased time;needs  repetition  -CL      Personal Safety moderate impairment;decreased awareness, need for assist;decreased awareness, need for safety;decreased insight to deficits;severe impairment;impulsive   Pt could not recall spinal precautions, tried to bend/twist  -CL      Personal Safety Interventions gait belt;fall prevention program maintained;nonskid shoes/slippers when out of bed  -CL      Recorded by [CL] Mary Kate Boyle OT      Bed Mobility, Assessment/Treatment    Bed Mob, Supine to Sit, [place] supervision required;verbal cues required  -CL      Bed Mob, Sit to Supine, [place] not tested  -CL      Bed Mobility, Comment Pt sat up into long-sitting impulsively in bed prior to cueing for log-roll technique, however required no assist.   -CL      Recorded by [CL] Mary Kate Boyle OT      Transfer Assessment/Treatment    Transfers, Sit-Stand [place] contact guard assist;2 person assist required;verbal cues required  -CL      Transfers, Stand-Sit [place] contact guard assist;2 person assist required;verbal cues required  -CL      Transfers, Sit-Stand-Sit, Assist Device rolling walker  -CL      Toilet Transfer, [place] contact guard assist;2 person assist required;verbal cues required  -CL      Toilet Transfer, Assistive Device rolling walker;bedside commode without drop arms  -CL      Transfer, Comment VCs for HP/sequencing.   -CL      Recorded by [CL] Mary Kate Boyle OT      Functional Mobility    Functional Mobility- Ind. Level minimum assist (75% patient effort);2 person assist required;verbal cues required  -CL      Functional Mobility- Device rolling walker  -CL      Functional Mobility-Distance (Feet) 250  -CL      Functional Mobility- Comment Pt required constant assist for RW management d/t L lateral drifting and pt keeping RW too far in front. Pt will impulsively let go of RW and demo episodes of LOB.   -CL      Recorded by [CL] Mary Kate Boyle OT      Lower Body Dressing Assessment/Training    LB  Dressing Assess/Train, Clothing Type donning:;socks  -CL      LB Dressing Assess/Train, [place] verbal cues required  -CL      LB Dressing Assess/Train, Comment Pt attempted to bend forward to don socks while in long-sitting in bed. Pt educated that this is not safe per spinal precautions, educated on appropriate technique though pt declined to perform.    -CL      Recorded by [CL] Mary Kate Boyle OT      Toileting Assessment/Training    Toileting Assess/Train, Position sitting  -CL      Toileting Assess/Train, Indepen Level set up required;verbal cues required  -CL      Toileting Assess/Train, Comment Pt required VCs for attention to task, though required no assist.   -CL      Recorded by [CL] Mary Kate Boyle OT      Grooming Assessment/Training    Grooming Assess/Train, Position sitting  -CL      Grooming Assess/Train, Indepen Level set up required  -CL      Grooming Assess/Train, Comment Pt washed face w/ wipe.   -CL      Recorded by [CL] Mary Kate Boyle OT      Motor Skills/Interventions    Additional Documentation Balance Skills Training (Group)  -CL      Recorded by [CL] Mary Kate Boyle OT      Balance Skills Training    Sitting-Level of Assistance Close supervision  -CL      Sitting-Balance Support Right upper extremity supported;Left upper extremity supported;Feet supported  -CL      Sitting-Balance Activities Forward lean;Reaching for objects;Trunk control activities  -CL      Standing-Level of Assistance Contact guard  -CL      Static Standing Balance Support assistive device  -CL      Standing-Balance Activities Weight Shift A-P;Weight Shift R-L  -CL      Gait Balance-Level of Assistance Minimum assistance;x2  -CL      Gait Balance Support assistive device  -CL      Gait Balance Activities scanning environment R/L;side-stepping  -CL      Recorded by [CL] Mary Kate Boyle OT      Positioning and Restraints    Pre-Treatment Position in bed  -CL      Post Treatment Position chair  -CL      In Chair notified  nsg;reclined;call light within reach;encouraged to call for assist;exit alarm on;waffle cushion;legs elevated;heels elevated  -CL      Recorded by [CL] Mary Kate Boyle OT        User Key  (r) = Recorded By, (t) = Taken By, (c) = Cosigned By    Initials Name Effective Dates    AC Mercedes Shah, OT 06/23/15 -     SJ Valentina Jeter, PT 06/19/15 -     KL Vesta Wilson, PT 05/18/15 -     CL Mary Kate Boyle, OT 06/08/16 -                 OT Goals       08/21/17 0936 08/18/17 1521 08/16/17 1547    Transfer Training OT LTG    Transfer Training OT LTG, Date Established   08/16/17  -CL    Transfer Training OT LTG, Time to Achieve   by discharge  -CL    Transfer Training OT LTG, Activity Type   bed to chair /chair to bed;toilet;sit to stand/stand to sit  -CL    Transfer Training OT LTG, [place] Level   contact guard assist  -CL    Transfer Training OT LTG, Additional Goal   AAD  -CL    Transfer Training OT LTG, Outcome goal met  -AC goal ongoing  -CL     Patient Education OT LTG    Patient Education OT LTG, Date Established   08/16/17  -CL    Patient Education OT LTG, Time to Achieve   by discharge  -CL    Patient Education OT LTG, Education Type   written program;HEP;posture/body mechanics;home safety;adaptive equipment mgmt;energy conservation  -CL    Patient Education OT LTG, Education Understanding   verbalizes understanding  -CL    Patient Education OT LTG, Additional Goal   Pt will accurately recall spinal precautions.   -CL    Patient Education OT LTG Outcome goal ongoing  -AC goal ongoing  -CL     Orientation OT LTG    Orientation OT LTG, Date Established   08/16/17  -CL    Orientation OT LTG, Time to Achieve   by discharge  -CL    Orientation OT LTG, Activity Type   person;place;time;100% treatment session  -CL    Orientation OT LTG, Outcome  goal met  -CL     LB Dressing OT LTG    LB Dressing Goal OT LTG, Date Established   08/16/17  -CL    LB Dressing Goal OT LTG, Time to Achieve   by discharge  -CL    LB  Dressing Goal OT LTG, Activity Type   Don socks/pants per spinal precautions  -CL    LB Dressing Goal OT LTG, [place] Level   contact guard assist  -CL    LB Dressing Goal OT LTG, Additional Goal   AAD  -CL    LB Dressing Goal OT LTG, Outcome goal ongoing  -AC goal ongoing  -CL       User Key  (r) = Recorded By, (t) = Taken By, (c) = Cosigned By    Initials Name Provider Type    AC Mercedes Shah, OT Occupational Therapist    MANJEET Boyle, OT Occupational Therapist          Occupational Therapy Education     Title: PT OT SLP Therapies (Active)     Topic: Occupational Therapy (Active)     Point: ADL training (Active)    Description: Instruct learner(s) on proper safety adaptation and remediation techniques during self care or transfers.   Instruct in proper use of assistive devices.    Learning Progress Summary    Learner Readiness Method Response Comment Documented by Status   Patient Acceptance E NR reviewed spinal precautions, ADL training  08/21/17 0935 Active    Acceptance E,D NR Pt educated on appropriate safety precautions, t/f techniques, spinal precautions, adaptive dressing techniques, and benefits of therapy.  08/18/17 1521 Active    Acceptance E,D NR Pt educated on appropriate safety precautions, spinal precautions, t/f techniques, log roll technique, and benefits of therapy.  08/16/17 1546 Active               Point: Precautions (Active)    Description: Instruct learner(s) on prescribed precautions during self-care and functional transfers.    Learning Progress Summary    Learner Readiness Method Response Comment Documented by Status   Patient Acceptance E,D NR Pt educated on appropriate safety precautions, t/f techniques, spinal precautions, adaptive dressing techniques, and benefits of therapy.  08/18/17 1521 Active    Acceptance E,D NR Pt educated on appropriate safety precautions, spinal precautions, t/f techniques, log roll technique, and benefits of therapy.  08/16/17 1546 Active                Point: Body mechanics (Active)    Description: Instruct learner(s) on proper positioning and spine alignment during self-care, functional mobility activities and/or exercises.    Learning Progress Summary    Learner Readiness Method Response Comment Documented by Status   Patient Acceptance E,D NR Pt educated on appropriate safety precautions, t/f techniques, spinal precautions, adaptive dressing techniques, and benefits of therapy.  08/18/17 1521 Active    Acceptance E,D NR Pt educated on appropriate safety precautions, spinal precautions, t/f techniques, log roll technique, and benefits of therapy.  08/16/17 1546 Active                      User Key     Initials Effective Dates Name Provider Type Discipline     06/23/15 -  Mercedes Shah, OT Occupational Therapist OT    CL 06/08/16 -  Mary Kate Boyle OT Occupational Therapist OT                  OT Recommendation and Plan  Anticipated Equipment Needs At Discharge:  (TBA further)  Anticipated Discharge Disposition: inpatient rehabilitation facility  Planned Therapy Interventions: adaptive equipment training, ADL retraining, balance training, energy conservation, bed mobility training, home exercise program, transfer training  Therapy Frequency: daily  Plan of Care Review  Plan Of Care Reviewed With: patient  Progress: progress towards functional goals is fair  Outcome Summary/Follow up Plan: Pt has met transfer goal and is increasing independence with toileting, bathing and grooming, but requiring multiple cues for safety and to  maintain spinal precautions        Outcome Measures       08/21/17 0840 08/18/17 1432 08/18/17 1430    How much help from another person do you currently need...    Turning from your back to your side while in flat bed without using bedrails?  4  -SJ     Moving from lying on back to sitting on the side of a flat bed without bedrails?  4  -SJ     Moving to and from a bed to a chair (including a wheelchair)?  3  -SJ     Standing  up from a chair using your arms (e.g., wheelchair, bedside chair)?  3  -SJ     Climbing 3-5 steps with a railing?  3  -SJ     To walk in hospital room?  3  -SJ     AM-PAC 6 Clicks Score  20  -SJ     How much help from another is currently needed...    Putting on and taking off regular lower body clothing? 2  -AC  2  -CL    Bathing (including washing, rinsing, and drying) 2  -AC  2  -CL    Toileting (which includes using toilet bed pan or urinal) 3  -AC  4  -CL    Putting on and taking off regular upper body clothing 3  -AC  3  -CL    Taking care of personal grooming (such as brushing teeth) 4  -AC  3  -CL    Eating meals 4  -AC  4  -CL    Score 18  -AC  18  -CL    Functional Assessment    Outcome Measure Options AM-PAC 6 Clicks Daily Activity (OT)  -AC AM-PAC 6 Clicks Basic Mobility (PT)  -SJ AM-PAC 6 Clicks Daily Activity (OT)  -CL      User Key  (r) = Recorded By, (t) = Taken By, (c) = Cosigned By    Initials Name Provider Type     Mercedes Shah, OT Occupational Therapist    NISHANT Corona, PT Physical Therapist    CL Mary Kate Boyle, OT Occupational Therapist           Time Calculation:         Time Calculation- OT       08/21/17 0940          Time Calculation- OT    OT Start Time 0840  -      Total Timed Code Minutes- OT 40 minute(s)  -      OT Received On 08/21/17  -      OT Goal Re-Cert Due Date 08/26/17  -        User Key  (r) = Recorded By, (t) = Taken By, (c) = Cosigned By    Initials Name Provider Type     Mercedes Shah OT Occupational Therapist           Therapy Charges for Today     Code Description Service Date Service Provider Modifiers Qty    [id number]  OT THERAPEUTIC ACT EA 15 MIN 8/21/2017 Mercedes Shah OT GO 3               Mercedes Shah OT  8/21/2017     Electronically signed by Mercedes Shah OT at 8/21/2017  9:41 AM

## 2017-08-21 NOTE — PLAN OF CARE
Problem: Patient Care Overview (Adult)  Goal: Plan of Care Review  Outcome: Ongoing (interventions implemented as appropriate)    08/21/17 1864   Coping/Psychosocial Response Interventions   Plan Of Care Reviewed With patient   Patient Care Overview   Progress progress toward functional goals as expected   Outcome Evaluation   Outcome Summary/Follow up Plan Remains confused and agitated at times, passed FEES today and diet ordered. TF continuned for now. Making some progress. Awaiting guardianship         Problem: Diabetes, Type 2 (Adult)  Goal: Signs and Symptoms of Listed Potential Problems Will be Absent or Manageable (Diabetes, Type 2)  Outcome: Ongoing (interventions implemented as appropriate)    Problem: Fluid Volume Deficit (Adult)  Goal: Fluid/Electrolyte Balance  Outcome: Ongoing (interventions implemented as appropriate)  Goal: Comfort/Well Being  Outcome: Ongoing (interventions implemented as appropriate)    Problem: Pressure Ulcer Risk (Ivan Scale) (Adult,Obstetrics,Pediatric)  Goal: Skin Integrity  Outcome: Ongoing (interventions implemented as appropriate)    Problem: Fall Risk (Adult)  Goal: Identify Related Risk Factors and Signs and Symptoms  Outcome: Ongoing (interventions implemented as appropriate)  Goal: Absence of Falls  Outcome: Ongoing (interventions implemented as appropriate)

## 2017-08-21 NOTE — PROGRESS NOTES
Continued Stay Note  [facility]     Patient Name: Paulina Johnson  MRN: [id number]  Today's Date: 8/21/2017    Admit Date: 8/15/2017          Discharge Plan       08/21/17 1107    Case Management/Social Work Plan    Plan Rehab    Patient/Family In Agreement With Plan yes    Additional Comments Spoke to patient at bedside regarding rehab placement at discharge.  Pt is agreeable at this time.  Pt would like [facility] to [facility] and [facility] for skilled therapy.  However, [facility] does not offer inpt rehab.  Referral sent to [facility].  Family can transport at discharge.  CM will follow up on referrals and continue to assist with discharge placement needs, ext. [phone].              Discharge Codes     None        Expected Discharge Date and Time     Expected Discharge Date Expected Discharge Time    Aug 22, 2017             Alea Mitchell RN

## 2017-08-21 NOTE — PROGRESS NOTES
Adult Nutrition  Assessment/PES    Patient Name:  Paulina Johnson  YOB: 1945  MRN: [id number]  Admit Date:  8/15/2017    Assessment Date:  8/21/2017        Reason for Assessment       08/21/17 0848    Reason for Assessment    Reason For Assessment/Visit follow up protocol    Time Spent (min) 5                                Problem/Interventions:            Problem 3       08/21/17 0848    Nutrition Diagnoses Problem 3    Problem 3 Nutrition Appropriate for Condition at this Time    Signs/Symptoms (evidenced by) PO Intake                      Education/Evaluation       08/21/17 0848    Monitor/Evaluation    Monitor Per protocol        Comments:      Electronically signed by:  Nelsy Avelar RD  08/21/17 8:48 AM

## 2017-08-21 NOTE — THERAPY TREATMENT NOTE
Acute Care - Physical Therapy Treatment Note  [facility]     Patient Name: Paulina Johnson  : 1945  MRN: [id number]  Today's Date: 2017  Onset of Illness/Injury or Date of Surgery Date: 08/15/17  Date of Referral to PT: 08/15/17  Referring Physician: MD Ishan    Admit Date: 8/15/2017    Visit Dx:    ICD-10-CM ICD-9-CM   1. Type 2 diabetes mellitus with ketoacidosis without coma, unspecified long term insulin use status E13.10 250.12   2. Nausea and vomiting, intractability of vomiting not specified, unspecified vomiting type R11.2 787.01   3. Confusion R41.0 298.9   4. PANFILO (acute kidney injury) N17.9 584.9   5. Impaired mobility and ADLs Z74.09 799.89   6. Impaired functional mobility, balance, gait, and endurance Z74.09 V49.89     Patient Active Problem List   Diagnosis   • S/P right L4-5 laminotomy with medial facetectomy, foraminotomy and L4-5 diskectomy   • Hypothyroid   • HTN (hypertension)   • Hyperlipidemia   • H/O: stroke   • Right leg pain   • Status post lumbar spinal fusion   • Lumbar stenosis with neurogenic claudication   • Recurrent herniation of lumbar disc   • status post dural rent   • Type 2 diabetes mellitus with ketoacidosis without coma   • Acute on chronic kidney failure   • Non-intractable vomiting with nausea               Adult Rehabilitation Note       17 1030 17 0840 17 1410    Rehab Assessment/Intervention    Discipline physical therapist  -SC occupational therapist  -AC physical therapist  -KL    Document Type therapy note (daily note)  -SC therapy note (daily note)  - therapy note (daily note)  -    Subjective Information no complaints;agree to therapy  -SC agree to therapy;complains of;pain  -AC agree to therapy;complains of;pain  -KL    Patient Effort, Rehab Treatment   adequate  -KL    Precautions/Limitations fall precautions  -SC fall precautions;spinal precautions  -AC     Recorded by [SC] Thierry Merchant PT [AC] Mercedes Shah OT [KL]  Vesta Wilson, PT    Pain Assessment    Pain Assessment [place] FACES  -SC 0-10  -AC 0-10  -KL    Green-[place] FACES Pain Rating 2  -SC      Pain Score  4  -AC 10  -KL    Post Pain Score 0  -SC 6  -AC 8  -KL    Pain Type Chronic pain  -SC Acute pain  -AC     Pain Location Hip  -SC Back  -AC Back  -KL    Pain Orientation Left  -SC Left  -AC     Pain Intervention(s) Repositioned  -SC Medication (See MAR);Repositioned  -AC     Recorded by [SC] Thierry Merchant, PT [AC] Mercedes Shah, OT [KL] Vesta Wilson, PT    Cognitive Assessment/Intervention    Current Cognitive/Communication Assessment functional  -SC impaired  -AC impaired  -    Orientation Status oriented x 4  -SC oriented to;person  -     Follows Commands/Answers Questions 100% of the time  -SC 75% of the time;needs cueing;needs repetition  - 75% of the time;able to follow single-step instructions  -    Personal Safety decreased insight to deficits;mild impairment  -SC decreased awareness, need for assist;decreased awareness, need for safety  -     Personal Safety Interventions fall prevention program maintained;gait belt  -SC fall prevention program maintained;gait belt;nonskid shoes/slippers when out of bed  -     Recorded by [SC] Thierry Merchant, PT [AC] Mercedes Shah, OT [KL] Vesta Wilson, PT    Bed Mobility, Assessment/Treatment    Bed Mobility, Assistive Device bed rails  -SC bed rails;head of bed elevated  -     Bed Mob, Supine to Sit, [place] supervision required  -SC verbal cues required;contact guard assist  - verbal cues required;contact guard assist  -    Bed Mob, Sit to Supine, [place]  verbal cues required;contact guard assist  - verbal cues required;contact guard assist  -    Bed Mobility, Safety Issues  decreased use of arms for pushing/pulling;decreased use of legs for bridging/pushing  -     Bed Mobility, Impairments strength decreased  -SC strength decreased;impaired balance  -     Bed  Mobility, Comment Up to edge of bed with good technique.  -SC multiple cues to log roll  -AC     Recorded by [SC] Thierry Merchant, PT [AC] Mercedes Shah, OT [KL] Vesta Wilson, PT    Transfer Assessment/Treatment    Transfers, Sit-Stand [place] supervision required;verbal cues required  -SC verbal cues required;contact guard assist  -AC verbal cues required;contact guard assist  -KL    Transfers, Stand-Sit [place] supervision required;verbal cues required  -SC verbal cues required;contact guard assist  -AC verbal cues required;contact guard assist  -KL    Transfers, Sit-Stand-Sit, Assist Device rolling walker  -SC rolling walker  -AC     Toilet Transfer, [place]  contact guard assist  -AC     Toilet Transfer, Assistive Device  bedside commode without drop arms  -AC     Transfer, Safety Issues balance decreased during turns  -SC      Transfer, Impairments impaired balance;strength decreased  -SC strength decreased;impaired balance  -     Transfer, Comment VC for hand placement  -SC verbal cues for hand placement, to place brakes on rollator to sit and stand  -AC     Recorded by [SC] Thierry Merchant, PT [AC] Mercedes Shah, OT [KL] Vesta Wilson, PT    Gait Assessment/Treatment    Gait, [place] Level contact guard assist  -SC  contact guard assist  -KL    Gait, Assistive Device rolling walker  -SC  rolling walker  -KL    Gait, Distance (Feet) 300  -SC  300  -KL    Gait, Gait Pattern Analysis swing-through gait  -SC      Gait, Gait Deviations forward flexed posture  -SC      Gait, Safety Issues balance decreased during turns;weight-shifting ability decreased  -SC      Gait, Impairments motor control impaired;strength decreased;impaired balance  -SC      Gait, Comment cues for staying close to walker and to improve posture  -SC  impulsive, continuous VC's to stay inside walker  -    Recorded by [SC] Thierry Merchant, PT  [KL] Vesta Wilson, PT    Functional Mobility    Functional  Mobility- Ind. Level  verbal cues required;contact guard assist  -AC     Functional Mobility- Device  rollator  -AC     Functional Mobility-Distance (Feet)  15  -AC     Functional Mobility- Comment  pt agreeable to go to sink and back to bed d/t pain  -AC     Recorded by  [AC] Mercedes Shah OT     Upper Body Bathing Assessment/Training    UB Bathing Assess/Train, Position  standing;sink side  -AC     UB Bathing Assess/Train, [place] Level  set up required;supervision required  -AC     Recorded by  [AC] Mercedes Shah OT     Lower Body Bathing Assessment/Training    LB Bathing Assess/Train, Position  standing;sitting  -AC     LB Bathing Assess/Train, [place] Level  moderate assist (50% patient effort)  -AC     LB Bathing Assess/Train, Comment  pt able to wash upper legs and bottom, unable to use cross leg technique to wash lower legs  -AC     Recorded by  [AC] Mercedes Shah OT     Lower Body Dressing Assessment/Training    LB Dressing Assess/Train, Clothing Type  donning:;slipper socks   undergarment  -AC     LB Dressing Assess/Train, Position  standing;sitting  -AC     LB Dressing Assess/Train, [place]  verbal cues required  -AC     LB Dressing Assess/Train, Comment  max assist to don socks as pt unable to complete using crossleg technique and deferred use of sockaide d/t cognition, mod for undergarment , pt able to pullup over knees and hips  -AC     Recorded by  [AC] Mercedes Shah OT     Toileting Assessment/Training    Toileting Assess/Train, Assistive Device  bedside commode  -AC     Toileting Assess/Train, Position  sitting;standing  -     Toileting Assess/Train, [place] Level  supervision required;set up required  -     Toileting Assess/Train, Comment  pt completed hygiene and clothing mgmt with setup supervision  -AC     Recorded by  [AC] Mercedes Shah OT     Grooming Assessment/Training    Grooming Assess/Train, Position  standing  -AC     Grooming Assess/Train, [place]ep[place] Level   supervision required  -     Grooming Assess/Train, Comment  pt washed face, combed hair and brushed teeth given supervision.    -AC     Recorded by  [AC] Mercedes Shah OT     Balance Skills Training    Training Strategies (Balance Skills)   Refused balance activities and LE exercise - states she is too sore   -    Standing-Level of Assistance  Contact guard  -     Static Standing Balance Support  Right upper extremity supported;Left upper extremity supported  -     Standing-Balance Activities  --   bathing and grooming  -     Standing Balance # of Minutes  20  -     Gait Balance-Level of Assistance Contact guard  -SC      Gait Balance Support assistive device  -SC      Recorded by [SC] Thierry Merchant, PT [AC] Mercedes Shah, OT [KL] Vesta Wilson, PT    Therapy Exercises    Bilateral Lower Extremities   --   refused  -KL    Recorded by   [KL] Vesta Wilson, PT    Positioning and Restraints    Pre-Treatment Position in bed  -SC in bed  -AC in bed  -KL    Post Treatment Position chair  -SC bed  - bed  -    In Bed sitting;call light within reach;encouraged to call for assist  -SC supine;call light within reach;encouraged to call for assist;exit alarm on;notified nsg  - notified nsg;supine;call light within reach;encouraged to call for assist;exit alarm on;with family/caregiver   Pt instructed to stay in bed unless RN is notified   -    In Chair   --   exit alarm was turned on  -    Recorded by [SC] Thierry Merchant, PT [AC] Mercedes Shah, OT [KL] Vesta Wilson, PT      08/18/17 1432 08/18/17 1430       Rehab Assessment/Intervention    Discipline physical therapist  -SJ occupational therapist  -CL     Document Type  therapy note (daily note)  -CL     Subjective Information no complaints;agree to therapy  -SJ agree to therapy;no complaints  -CL     Patient Effort, Rehab Treatment good  -SJ good  -CL     Symptoms Noted During/After Treatment none  -SJ      Precautions/Limitations fall  precautions;spinal precautions   lumbar spinal precautions  -SJ fall precautions;spinal precautions;other (see comments)   s/p TLIF on 07/05/17, exit alarm, residual R sided weakness  -CL     Recorded by [SJ] Valentina Corona, PT [CL] Mary Kate Boyle, KEVYN     Vital Signs    Pre Systolic BP Rehab  154  -CL     Pre Treatment Diastolic BP  52  -CL     Post Systolic BP Rehab 150  -  -CL     Post Treatment Diastolic BP 88  -SJ 88  -CL     Pretreatment Heart Rate (beats/min) 95  -SJ 94  -CL     Posttreatment Heart Rate (beats/min) 101  -SJ 99  -CL     Pre SpO2 (%)  98  -CL     O2 Delivery Pre Treatment  room air  -CL     Post SpO2 (%) 100  -SJ 98  -CL     O2 Delivery Post Treatment room air  -SJ room air  -CL     Pre Patient Position  Supine  -CL     Intra Patient Position  Standing  -CL     Post Patient Position  Sitting  -CL     Recorded by [SJ] Valentina Corona, PT [CL] Mary Kate Boyle OT     Pain Assessment    Pain Assessment No/denies pain  -SJ No/denies pain  -CL     Pain Score 0  -SJ      Post Pain Score 0  -SJ      Recorded by [SJ] Valentina Corona, PT [CL] Mary Kate Boyle OT     Cognitive Assessment/Intervention    Current Cognitive/Communication Assessment impaired  -SJ impaired  -CL     Orientation Status oriented to;person  -SJ oriented x 4  -CL     Follows Commands/Answers Questions 75% of the time;able to follow single-step instructions;needs cueing  -SJ 75% of the time;needs cueing;needs increased time;needs repetition  -CL     Personal Safety unaware of cognitive deficits;decreased awareness, need for safety;decreased insight to deficits  -SJ moderate impairment;decreased awareness, need for assist;decreased awareness, need for safety;decreased insight to deficits;severe impairment;impulsive   Pt could not recall spinal precautions, tried to bend/twist  -CL     Personal Safety Interventions fall prevention program maintained;gait belt;muscle strengthening facilitated;nonskid shoes/slippers when out of bed  -SJ  gait belt;fall prevention program maintained;nonskid shoes/slippers when out of bed  -CL     Recorded by [SJ] Valentina Corona, PT [CL] Mary Kate Boyle OT     Bed Mobility, Assessment/Treatment    Bed Mob, Supine to Sit, [place] conditional independence  -SJ supervision required;verbal cues required  -CL     Bed Mob, Sit to Supine, [place]  not tested  -CL     Bed Mobility, Comment Pt impulsively sat up without concern for spinal precautions.   -SJ Pt sat up into long-sitting impulsively in bed prior to cueing for log-roll technique, however required no assist.   -CL     Recorded by [SJ] Valentina Corona, PT [CL] Mary Kate Boyle OT     Transfer Assessment/Treatment    Transfers, Sit-Stand [place] contact guard assist;2 person assist required;verbal cues required  -SJ contact guard assist;2 person assist required;verbal cues required  -CL     Transfers, Stand-Sit [place] contact guard assist;verbal cues required  -SJ contact guard assist;2 person assist required;verbal cues required  -CL     Transfers, Sit-Stand-Sit, Assist Device rolling walker  -SJ rolling walker  -CL     Toilet Transfer, [place] contact guard assist  -SJ contact guard assist;2 person assist required;verbal cues required  -CL     Toilet Transfer, Assistive Device bedside commode without drop arms;rolling walker  -SJ rolling walker;bedside commode without drop arms  -CL     Transfer, Safety Issues weight-shifting ability decreased;impulsivity  -SJ      Transfer, Impairments strength decreased;impaired balance  -SJ      Transfer, Comment Decreased safety awareness, cues for hand placement  -SJ VCs for HP/sequencing.   -CL     Recorded by [SJ] Valentina Corona, PT [CL] Mary Kate Boyle, OT     Gait Assessment/Treatment    Gait, [place] Level minimum assist (75% patient effort);1 person + 1 person to manage equipment;verbal cues required  -SJ      Gait, Assistive Device rolling walker  -SJ      Gait, Distance (Feet) 250  -SJ      Gait,  Gait Pattern Analysis swing-through gait  -SJ      Gait, Gait Deviations lin decreased;decreased heel strike;double stance time increased;forward flexed posture  -SJ      Gait, Safety Issues step length decreased;balance decreased during turns  -SJ      Gait, Impairments strength decreased;impaired balance;coordination impaired  -SJ      Gait, Comment Pt required [place] for RW to steer straight and [place] to correct during LOB.   -SJ      Recorded by [SJ] Valentina Corona, PT      Functional Mobility    Functional Mobility- Ind. Level  minimum assist (75% patient effort);2 person assist required;verbal cues required  -CL     Functional Mobility- Device  rolling walker  -CL     Functional Mobility-Distance (Feet)  250  -CL     Functional Mobility- Comment  Pt required constant assist for RW management d/t L lateral drifting and pt keeping RW too far in front. Pt will impulsively let go of RW and demo episodes of LOB.   -CL     Recorded by  [CL] Mary Kate Boyle OT     Lower Body Dressing Assessment/Training    LB Dressing Assess/Train, Clothing Type  donning:;socks  -CL     LB Dressing Assess/Train, [place]  verbal cues required  -CL     LB Dressing Assess/Train, Comment  Pt attempted to bend forward to don socks while in long-sitting in bed. Pt educated that this is not safe per spinal precautions, educated on appropriate technique though pt declined to perform.    -CL     Recorded by  [CL] Mary Kate Boyle OT     Toileting Assessment/Training    Toileting Assess/Train, Position  sitting  -CL     Toileting Assess/Train, [place]ep[place] Level  set up required;verbal cues required  -CL     Toileting Assess/Train, Comment  Pt required VCs for attention to task, though required no assist.   -CL     Recorded by  [CL] Mary Kate Boyle OT     Grooming Assessment/Training    Grooming Assess/Train, Position  sitting  -CL     Grooming Assess/Train, [place]ep[place] Level  set up required  -CL     Grooming Assess/Train, Comment  Pt washed face w/  wipe.   -CL     Recorded by  [CL] Mary Kate Boyle OT     Motor Skills/Interventions    Additional Documentation  Balance Skills Training (Group)  -CL     Recorded by  [CL] Mary Kate Boyle OT     Balance Skills Training    Sitting-Level of Assistance Close supervision  -SJ Close supervision  -CL     Sitting-Balance Support Right upper extremity supported;Left upper extremity supported  -SJ Right upper extremity supported;Left upper extremity supported;Feet supported  -CL     Sitting-Balance Activities  Forward lean;Reaching for objects;[place] control activities  -CL     Standing-Level of Assistance Contact guard  -SJ Contact guard  -CL     Static Standing Balance Support assistive device  -SJ assistive device  -CL     Standing-Balance Activities  Weight Shift A-P;Weight Shift R-L  -CL     Gait Balance-Level of Assistance Minimum assistance  -SJ Minimum assistance;x2  -CL     Gait Balance Support assistive device  -SJ assistive device  -CL     Gait Balance Activities  scanning environment R/L;side-stepping  -CL     Recorded by [SJ] Valentina Corona, PT [CL] Mary Kate Boyle OT     Positioning and Restraints    Pre-Treatment Position in bed  -SJ in bed  -CL     Post Treatment Position chair  -SJ chair  -CL     In Chair notified nsg;reclined;call light within reach;encouraged to call for assist;exit alarm on;heels elevated  -SJ notified nsg;reclined;call light within reach;encouraged to call for assist;exit alarm on;w[place]le cushion;legs elevated;heels elevated  -CL     Recorded by [SJ] Valentina Corona, PT [CL] Mary Kate Boyle OT       User Key  (r) = Recorded By, (t) = Taken By, (c) = Cosigned By    Initials Name Effective Dates    SC Thierry Merchant, PT 06/19/15 -     AC Mercedes Shah, OT 06/23/15 -     SJ Valentina Corona, PT 06/19/15 -     KL Vesta Wilson, PT 05/18/15 -     CL Mary Kate Boyle, OT 06/08/16 -                 IP PT Goals       08/17/17 1318 08/16/17 1556       Bed Mobility PT LTG    Bed Mobility PT LTG, Date  Established  08/16/17  -SJ     Bed Mobility PT LTG, Time to Achieve  2 wks  -SJ     Bed Mobility PT LTG, Activity Type  roll left/roll right;supine to sit/sit to supine  -SJ     Bed Mobility PT LTG, [place] Level  supervision required  -SJ     Bed Mobility PT LTG, Outcome goal ongoing  -DELORES (r) KR (t) DELORES (c) goal ongoing  -SJ     Transfer Training PT LTG    Transfer Training PT LTG, Date Established  08/16/17  -SJ     Transfer Training PT LTG, Time to Achieve  2 wks  -SJ     Transfer Training PT LTG, Activity Type  bed to chair /chair to bed;sit to stand/stand to sit  -SJ     Transfer Training PT LTG, [place] Level  supervision required  -SJ     Transfer Training PT LTG, Assist Device  walker, rolling  -SJ     Transfer Training PT LTG, Outcome goal ongoing  -DELORES (r) KR (t) DELORES (c) goal ongoing  -SJ     Gait Training PT LTG    Gait Training Goal PT LTG, Date Established  08/16/17  -SJ     Gait Training Goal PT LTG, Time to Achieve  2 wks  -SJ     Gait Training Goal PT LTG, [place] Level  contact guard assist  -SJ     Gait Training Goal PT LTG, Assist Device  walker, rolling  -SJ     Gait Training Goal PT LTG, Distance to Achieve  500  -SJ     Gait Training Goal PT LTG, Outcome goal ongoing  -DELORES (r) KR (t) DELORES (c) goal ongoing  -SJ       User Key  (r) = Recorded By, (t) = Taken By, (c) = Cosigned By    Initials Name Provider Type    DELORES Motta, PT Physical Therapist    NISHANT Corona, PT Physical Therapist    NEISHA Naqvi, PT Student PT Student          Physical Therapy Education     Title: PT OT SLP Therapies (Active)     Topic: Physical Therapy (Done)     Point: Mobility training (Done)    Learning Progress Summary    Learner Readiness Method Response Comment Documented by Status   Patient Eager E HERB DERAS cues for safety with mobility SC 08/21/17 1145 Done    Acceptance E NR  KL 08/20/17 1521 Active    Acceptance E,D NR  SJ 08/18/17 1522 Active    Acceptance E NR  KR 08/17/17 1318 Active     Acceptance E NR   08/16/17 1559 Active               Point: Home exercise program (Done)    Learning Progress Summary    Learner Readiness Method Response Comment Documented by Status   Patient Eager E VU,DU cues for safety with mobility SC 08/21/17 1145 Done    Acceptance E NR  KL 08/20/17 1521 Active    Acceptance E,D NR  SJ 08/18/17 1522 Active    Acceptance E NR  SJ 08/16/17 1559 Active               Point: Body mechanics (Done)    Learning Progress Summary    Learner Readiness Method Response Comment Documented by Status   Patient Eager E VU,DU cues for safety with mobility SC 08/21/17 1145 Done    Acceptance E NR  KL 08/20/17 1521 Active    Acceptance E,D NR   08/18/17 1522 Active    Acceptance E NR  KR 08/17/17 1318 Active    Acceptance E NR   08/16/17 1559 Active               Point: Precautions (Done)    Learning Progress Summary    Learner Readiness Method Response Comment Documented by Status   Patient Eager E VU,DU cues for safety with mobility SC 08/21/17 1145 Done    Acceptance E NR   08/20/17 1521 Active    Acceptance E,D NR   08/18/17 1522 Active    Acceptance E NR  KR 08/17/17 1318 Active    Acceptance E NR   08/16/17 1559 Active                      User Key     Initials Effective Dates Name Provider Type Discipline    SC 06/19/15 -  Thierry Merchant, PT Physical Therapist PT     06/19/15 -  Valentina Corona, PT Physical Therapist PT     05/18/15 -  Vseta Wilson, PT Physical Therapist PT     05/30/17 -  Clotilde Naqvi, PT Student PT Student PT                    PT Recommendation and Plan  Anticipated Discharge Disposition: inpatient rehabilitation facility  Planned Therapy Interventions: balance training, bed mobility training, gait training, home exercise program, patient/family education, strengthening, transfer training  PT Frequency: daily  Plan of Care Review  Plan Of Care Reviewed With: patient  Progress: progress toward functional goals as expected  Outcome  Summary/Follow up Plan: Felling better. Mobility improving. Abmbulating in hallway.          Outcome Measures       08/21/17 1030 08/21/17 0840 08/18/17 1432    How much help from another person do you currently need...    Turning from your back to your side while in flat bed without using bedrails? 4  -SC  4  -SJ    Moving from lying on back to sitting on the side of a flat bed without bedrails? 4  -SC  4  -SJ    Moving to and from a bed to a chair (including a wheelchair)? 3  -SC  3  -SJ    Standing up from a chair using your arms (e.g., wheelchair, bedside chair)? 3  -SC  3  -SJ    Climbing 3-5 steps with a railing? 3  -SC  3  -SJ    To walk in hospital room? 3  -SC  3  -SJ    AM-PAC 6 Clicks Score 20  -SC  20  -SJ    How much help from another is currently needed...    Putting on and taking off regular lower body clothing?  2  -AC     Bathing (including washing, rinsing, and drying)  2  -AC     Toileting (which includes using toilet bed pan or urinal)  3  -AC     Putting on and taking off regular upper body clothing  3  -AC     Taking care of personal grooming (such as brushing teeth)  4  -AC     Eating meals  4  -AC     Score  18  -AC     Functional Assessment    Outcome Measure Options AM-PAC 6 Clicks Basic Mobility (PT)  -SC AM-PAC 6 Clicks Daily Activity (OT)  -AC AM-PAC 6 Clicks Basic Mobility (PT)  -      08/18/17 1430          How much help from another is currently needed...    Putting on and taking off regular lower body clothing? 2  -CL      Bathing (including washing, rinsing, and drying) 2  -CL      Toileting (which includes using toilet bed pan or urinal) 4  -CL      Putting on and taking off regular upper body clothing 3  -CL      Taking care of personal grooming (such as brushing teeth) 3  -CL      Eating meals 4  -CL      Score 18  -CL      Functional Assessment    Outcome Measure Options AM-PAC 6 Clicks Daily Activity (OT)  -CL        User Key  (r) = Recorded By, (t) = Taken By, (c) = Cosigned  By    Initials Name Provider Type    SC Thierry Merchant, PT Physical Therapist    AC Mercedes Shah, OT Occupational Therapist    SJ Valentina Corona, PT Physical Therapist    MANJEET Boyle, OT Occupational Therapist           Time Calculation:         PT Charges       08/21/17 1147          Time Calculation    Start Time 1030  -SC      PT Received On 08/21/17  -SC      PT Goal Re-Cert Due Date 08/26/17  -SC      Time Calculation- PT    Total Timed Code Minutes- PT 20 minute(s)  -SC        User Key  (r) = Recorded By, (t) = Taken By, (c) = Cosigned By    Initials Name Provider Type    SC Thierry Merchant, PT Physical Therapist          Therapy Charges for Today     Code Description Service Date Service Provider Modifiers Qty    [id number] HC GAIT TRAINING EA 15 MIN 8/21/2017 Thierry Merchant, PT GP 1          PT G-Codes  Outcome Measure Options: AM-PAC 6 Clicks Basic Mobility (PT)    Thierry Merchant PT  8/21/2017

## 2017-08-21 NOTE — PLAN OF CARE
Problem: Patient Care Overview (Adult)  Goal: Plan of Care Review  Outcome: Ongoing (interventions implemented as appropriate)    08/21/17 0414   Coping/Psychosocial Response Interventions   Plan Of Care Reviewed With spouse   Patient Care Overview   Progress progress towards functional goals is fair

## 2017-08-21 NOTE — PROGRESS NOTES
[facility] Medicine Services  INPATIENT PROGRESS NOTE    Date of Admission: 8/15/2017  Length of Stay: 6  Primary Care Physician: Hernan Thompson MD    Subjective-- HPI/Events overnight/ROS/CC- Hospital follow visit.  Detailed ROS not detailed as performed below      Laying in bed, her primary complaint is left side/hip pain. She is awake, alert and conversant. Per nursing, had a better night and was not agitated or aggressive.       Objective      Temp:  [98 °F (36.7 °C)-99.5 °F (37.5 °C)] 98.6 °F (37 °C)  Heart Rate:  [] 94  Resp:  [17-20] 20  BP: (137-147)/(61-85) 137/80    Physical Exam   Constitutional: no acute distress, awake, alert, conversant   Respiratory: Clear to auscultation bilaterally, nonlabored respirations   Cardiovascular: RRR, no murmurs, rubs, or gallops, palpable pedal pulses bilaterally  Gastrointestinal: Positive bowel sounds, soft, nontender, nondistended  Musculoskeletal: No bilateral ankle edema. L flank with healing ecchymosis, mild tenderness to palpation  Psychiatric: oriented x 2 (she thinks she is in [place], [place] but does know she's in a hospital), flat affect, cooperative and calm   Neurologic: Strength symmetric in all extremities      Results Review:    I have reviewed the labs, radiology results and diagnostic studies.    Results from last 7 days  Lab Units 08/21/17  0757 08/20/17  1108 08/18/17  0401   WBC 10*3/mm3 6.18 6.63 6.84   HEMOGLOBIN g/dL 9.0* 8.8* 8.9*   HEMATOCRIT % 29.3* 28.7* 27.7*   PLATELETS 10*3/mm3 425 387 483*     Results from last 7 days  Lab Units 08/21/17  0757 08/20/17  1108 08/18/17  0401   SODIUM mmol/L 137 134 136   POTASSIUM mmol/L 4.2 4.3 4.3   CHLORIDE mmol/L 108 106 105   CO2 mmol/L 22.0 20.0 24.0   BUN mg/dL 18 20 12   CREATININE mg/dL 1.00 1.20 1.10   GLUCOSE mg/dL 160* 456* 118*   CALCIUM mg/dL 9.0 8.8 8.7     Culture Data:  Blood Culture   Date Value Ref Range Status   08/15/2017 No growth at 5 days  Final    08/15/2017 No growth at 5 days  Final     Urine Culture   Date Value Ref Range Status   08/17/2017 40,000-50,000 CFU/mL Escherichia coli (A)  Final     Radiology Data: n/a    I have reviewed the medications.    Assessment/Plan     Ms. Johnson is a 72yo female with a history of poorly controlled DMII, hypothyroidism, HTN and remote history of CVA. Questionable underlying dementia. She was admitted to [facility] on 8/15/17 for DKA with blood glucose of 940 on admission. She was transferred to the floor on 8/19 and hospital medicine has assumed care.      Insulin-dependent DMII with ketoacidosis without coma  - Patient reported that she checks her blood glucose QAM and QHS and that her sugars have fluctuated a lot since her back surgery.   - Usually follows with [facility] but has missed appointments. Next appointment in 11/2017, recommend moving this appointment up at discharge.     Hypothroidism  - TSH this admission was 9. No T4, will check   - Synthroid has been increased from 50mcg to 88mcg this admission  - Will need recheck in 6 weeks     PANFILO, resolved  - No proteinuria on admission, Likely no CKD     Hypertension  - Improving, control still suboptimal   - Unclear why she is on Lasix, will continue to hold, she is euvolemic    Gastroparesis, with chronic N/V  s/p R L4-L5 laminotomy with medial facetectomy, foraminotomy and L4-L5 discectomy, repeat XRs have shown improvement in her spine, however the patient has chronic/stable back pain       Hyperlipidemia, statin dose increased this admission secondary to history of stroke. Continue ASA      Mild cognitive impairment vs. Early dementia  - Recommend outpatient follow up with PCP or neuro  - Have discussed follow up with a behavioral neurologist with patient's   - Seroquel started last night, did well overnight, will continue     Anemia, acute vs. Chronic  - Etiology unclear.   - Guiac negative, patient denies melena  or hematochezia  - Replacing iron and folate  - Further work up can be completed on an outpatient basis     Plan  - Glucose control continues to be labile. Would prefer to monitor in hospital until sugars have improved and are more consistent.   - Check free T4 in AM   - Patient agreeable to rehab, have discussed with case management     Susie Covarrubias PA-C   08/21/17   9:03 AM    Please note that portions of this note may have been completed with a voice recognition program. Efforts were made to edit the dictations, but occasionally words are mistranscribed.

## 2017-08-22 VITALS
WEIGHT: 126.2 LBS | DIASTOLIC BLOOD PRESSURE: 77 MMHG | OXYGEN SATURATION: 98 % | TEMPERATURE: 97 F | BODY MASS INDEX: 23.22 KG/M2 | HEART RATE: 88 BPM | RESPIRATION RATE: 18 BRPM | SYSTOLIC BLOOD PRESSURE: 145 MMHG | HEIGHT: 62 IN

## 2017-08-22 PROBLEM — N17.9 ACUTE ON CHRONIC KIDNEY FAILURE (HCC): Status: RESOLVED | Noted: 2017-08-16 | Resolved: 2017-08-22

## 2017-08-22 PROBLEM — R11.2 NON-INTRACTABLE VOMITING WITH NAUSEA: Status: RESOLVED | Noted: 2017-08-16 | Resolved: 2017-08-22

## 2017-08-22 PROBLEM — N18.9 ACUTE ON CHRONIC KIDNEY FAILURE: Status: RESOLVED | Noted: 2017-08-16 | Resolved: 2017-08-22

## 2017-08-22 LAB
ANION GAP SERPL CALCULATED.3IONS-SCNC: 5 MMOL/L (ref 3–11)
BUN BLD-MCNC: 26 MG/DL (ref 9–23)
BUN/CREAT SERPL: 26 (ref 7–25)
CALCIUM SPEC-SCNC: 9 MG/DL (ref 8.7–10.4)
CHLORIDE SERPL-SCNC: 109 MMOL/L (ref 99–109)
CO2 SERPL-SCNC: 25 MMOL/L (ref 20–31)
CREAT BLD-MCNC: 1 MG/DL (ref 0.6–1.3)
DEPRECATED RDW RBC AUTO: 53.3 FL (ref 37–54)
ERYTHROCYTE [DISTWIDTH] IN BLOOD BY AUTOMATED COUNT: 17.2 % (ref 11.3–14.5)
GFR SERPL CREATININE-BSD FRML MDRD: 55 ML/MIN/1.73
GLUCOSE BLD-MCNC: 85 MG/DL (ref 70–100)
GLUCOSE BLDC GLUCOMTR-MCNC: 105 MG/DL (ref 70–130)
GLUCOSE BLDC GLUCOMTR-MCNC: 442 MG/DL (ref 70–130)
GLUCOSE BLDC GLUCOMTR-MCNC: 466 MG/DL (ref 70–130)
GLUCOSE BLDC GLUCOMTR-MCNC: >599 MG/DL (ref 70–130)
GLUCOSE BLDC GLUCOMTR-MCNC: >599 MG/DL (ref 70–130)
HCT VFR BLD AUTO: 26.9 % (ref 34.5–44)
HGB BLD-MCNC: 8.3 G/DL (ref 11.5–15.5)
MCH RBC QN AUTO: 26.4 PG (ref 27–31)
MCHC RBC AUTO-ENTMCNC: 30.9 G/DL (ref 32–36)
MCV RBC AUTO: 85.7 FL (ref 80–99)
PLATELET # BLD AUTO: 450 10*3/MM3 (ref 150–450)
PMV BLD AUTO: 8.6 FL (ref 6–12)
POTASSIUM BLD-SCNC: 4.1 MMOL/L (ref 3.5–5.5)
RBC # BLD AUTO: 3.14 10*6/MM3 (ref 3.89–5.14)
SODIUM BLD-SCNC: 139 MMOL/L (ref 132–146)
T4 FREE SERPL-MCNC: 1.03 NG/DL (ref 0.89–1.76)
WBC NRBC COR # BLD: 6.02 10*3/MM3 (ref 3.5–10.8)

## 2017-08-22 PROCEDURE — 84439 ASSAY OF FREE THYROXINE: CPT | Performed by: PHYSICIAN ASSISTANT

## 2017-08-22 PROCEDURE — 85027 COMPLETE CBC AUTOMATED: CPT | Performed by: PHYSICIAN ASSISTANT

## 2017-08-22 PROCEDURE — 82962 GLUCOSE BLOOD TEST: CPT

## 2017-08-22 PROCEDURE — 80048 BASIC METABOLIC PNL TOTAL CA: CPT | Performed by: PHYSICIAN ASSISTANT

## 2017-08-22 PROCEDURE — 97116 GAIT TRAINING THERAPY: CPT

## 2017-08-22 PROCEDURE — 99239 HOSP IP/OBS DSCHRG MGMT >30: CPT | Performed by: PHYSICIAN ASSISTANT

## 2017-08-22 PROCEDURE — 25010000002 HEPARIN (PORCINE) PER 1000 UNITS: Performed by: NURSE PRACTITIONER

## 2017-08-22 PROCEDURE — 63710000001 INSULIN DETEMIR PER 5 UNITS: Performed by: HOSPITALIST

## 2017-08-22 PROCEDURE — 97110 THERAPEUTIC EXERCISES: CPT

## 2017-08-22 RX ORDER — DULOXETIN HYDROCHLORIDE 30 MG/1
30 CAPSULE, DELAYED RELEASE ORAL DAILY
Start: 2017-08-22 | End: 2020-11-10

## 2017-08-22 RX ORDER — ASCORBIC ACID 500 MG
500 TABLET ORAL DAILY
Start: 2017-08-22

## 2017-08-22 RX ORDER — FOLIC ACID 1 MG/1
500 TABLET ORAL DAILY
Start: 2017-08-22

## 2017-08-22 RX ORDER — IBUPROFEN 200 MG
600 TABLET ORAL EVERY 6 HOURS PRN
Start: 2017-08-22

## 2017-08-22 RX ORDER — QUETIAPINE FUMARATE 25 MG/1
25 TABLET, FILM COATED ORAL NIGHTLY
Qty: 14 TABLET | Refills: 0 | Status: SHIPPED | OUTPATIENT
Start: 2017-08-22 | End: 2021-06-16 | Stop reason: SDUPTHER

## 2017-08-22 RX ORDER — FERROUS SULFATE 325(65) MG
325 TABLET ORAL
Start: 2017-08-22

## 2017-08-22 RX ORDER — CYCLOBENZAPRINE HCL 10 MG
10 TABLET ORAL 3 TIMES DAILY PRN
Refills: 0
Start: 2017-08-22

## 2017-08-22 RX ORDER — POLYETHYLENE GLYCOL 3350 17 G/17G
17 POWDER, FOR SOLUTION ORAL DAILY PRN
Qty: 250 G | Refills: 1
Start: 2017-08-22 | End: 2017-11-15 | Stop reason: SDUPTHER

## 2017-08-22 RX ORDER — DOCUSATE SODIUM 100 MG/1
100 CAPSULE, LIQUID FILLED ORAL DAILY PRN
Start: 2017-08-22

## 2017-08-22 RX ORDER — FAMOTIDINE 20 MG/1
20 TABLET, FILM COATED ORAL 2 TIMES DAILY
Start: 2017-08-22

## 2017-08-22 RX ORDER — LEVOTHYROXINE SODIUM 0.05 MG/1
50 TABLET ORAL DAILY
Start: 2017-08-22 | End: 2020-11-11

## 2017-08-22 RX ADMIN — HEPARIN SODIUM 5000 UNITS: 5000 INJECTION, SOLUTION INTRAVENOUS; SUBCUTANEOUS at 05:24

## 2017-08-22 RX ADMIN — INSULIN DETEMIR 25 UNITS: 100 INJECTION, SOLUTION SUBCUTANEOUS at 08:25

## 2017-08-22 RX ADMIN — CLOPIDOGREL BISULFATE 75 MG: 75 TABLET ORAL at 08:23

## 2017-08-22 RX ADMIN — Medication 325 MG: at 08:24

## 2017-08-22 RX ADMIN — DULOXETINE HYDROCHLORIDE 30 MG: 30 CAPSULE, DELAYED RELEASE ORAL at 08:24

## 2017-08-22 RX ADMIN — ASPIRIN 81 MG: 81 TABLET, COATED ORAL at 08:23

## 2017-08-22 RX ADMIN — CYCLOBENZAPRINE HYDROCHLORIDE 10 MG: 10 TABLET, FILM COATED ORAL at 05:24

## 2017-08-22 RX ADMIN — IBUPROFEN 400 MG: 400 TABLET ORAL at 05:24

## 2017-08-22 RX ADMIN — RALOXIFENE HYDROCHLORIDE 60 MG: 60 TABLET, FILM COATED ORAL at 08:23

## 2017-08-22 RX ADMIN — FAMOTIDINE 20 MG: 20 TABLET ORAL at 08:23

## 2017-08-22 RX ADMIN — LEVOTHYROXINE SODIUM 88 MCG: 88 TABLET ORAL at 05:24

## 2017-08-22 RX ADMIN — IBUPROFEN 400 MG: 400 TABLET ORAL at 12:03

## 2017-08-22 RX ADMIN — FOLIC ACID 500 MCG: 1 TABLET ORAL at 08:24

## 2017-08-22 RX ADMIN — INSULIN LISPRO 7 UNITS: 100 INJECTION, SOLUTION INTRAVENOUS; SUBCUTANEOUS at 12:03

## 2017-08-22 RX ADMIN — OXYCODONE HYDROCHLORIDE AND ACETAMINOPHEN 500 MG: 500 TABLET ORAL at 08:24

## 2017-08-22 RX ADMIN — VALSARTAN 160 MG: 160 TABLET, FILM COATED ORAL at 08:23

## 2017-08-22 NOTE — THERAPY TREATMENT NOTE
Acute Care - Physical Therapy Treatment Note  [facility]     Patient Name: Paulina Johnson  : 1945  MRN: [id number]  Today's Date: 2017  Onset of Illness/Injury or Date of Surgery Date: 08/15/17  Date of Referral to PT: 08/15/17  Referring Physician: MD Ishan    Admit Date: 8/15/2017    Visit Dx:    ICD-10-CM ICD-9-CM   1. Type 2 diabetes mellitus with ketoacidosis without coma, unspecified long term insulin use status E13.10 250.12   2. Nausea and vomiting, intractability of vomiting not specified, unspecified vomiting type R11.2 787.01   3. Confusion R41.0 298.9   4. PANFILO (acute kidney injury) N17.9 584.9   5. Impaired mobility and ADLs Z74.09 799.89   6. Impaired functional mobility, balance, gait, and endurance Z74.09 V49.89     Patient Active Problem List   Diagnosis   • S/P right L4-5 laminotomy with medial facetectomy, foraminotomy and L4-5 diskectomy   • Hypothyroid   • HTN (hypertension)   • Hyperlipidemia   • H/O: stroke   • Right leg pain   • Status post lumbar spinal fusion   • Lumbar stenosis with neurogenic claudication   • Recurrent herniation of lumbar disc   • status post dural rent   • Type 2 diabetes mellitus with ketoacidosis without coma   • Acute on chronic kidney failure   • Non-intractable vomiting with nausea               Adult Rehabilitation Note       17 0944 17 1030 17 0840    Rehab Assessment/Intervention    Discipline physical therapy assistant  -AS physical therapist  -SC occupational therapist  -AC    Document Type therapy note (daily note)  -AS therapy note (daily note)  -SC therapy note (daily note)  -AC    Subjective Information agree to therapy;complains of;pain;weakness  -AS no complaints;agree to therapy  -SC agree to therapy;complains of;pain  -AC    Patient Effort, Rehab Treatment good  -AS      Symptoms Noted During/After Treatment other (see comments)   left hip pain decreased with activity  -AS      Precautions/Limitations fall  precautions;other (see comments)   s/p TLIF on 7/5/17, exit alarm  -AS fall precautions  -SC fall precautions;spinal precautions  -AC    Recorded by [AS] Yamel Rashid PTA [SC] Thierry Merchant, PT [AC] Mercedes Shah, OT    Pain Assessment    Pain Assessment Green-[place] FACES  -AS Green-[place]aker FACES  -SC 0-10  -AC    Green-[place]ena FACES Pain Rating  2  -SC     Pain Score 2  -AS  4  -AC    Post Pain Score 0  -AS 0  -SC 6  -AC    Pain Type Chronic pain  -AS Chronic pain  -SC Acute pain  -AC    Pain Location Hip  -AS Hip  -SC Back  -AC    Pain Orientation Left  -AS Left  -SC Left  -AC    Pain Intervention(s) Repositioned;Ambulation/increased activity  -AS Repositioned  -SC Medication (See MAR);Repositioned  -AC    Response to Interventions decreased pain with activity  -AS      Recorded by [AS] Yamel Rashid PTA [SC] Thierry Merchant, PT [AC] Mercedes Shah, OT    Cognitive Assessment/Intervention    Current Cognitive/Communication Assessment functional  -AS functional  -SC impaired  -AC    Orientation Status oriented x 4  -AS oriented x 4  -SC oriented to;person  -AC    Follows Commands/Answers Questions 100% of the time  -% of the time  -SC 75% of the time;needs cueing;needs repetition  -AC    Personal Safety mild impairment;decreased awareness, need for assist;decreased awareness, need for safety  -AS decreased insight to deficits;mild impairment  -SC decreased awareness, need for assist;decreased awareness, need for safety  -AC    Personal Safety Interventions fall prevention program maintained;gait belt;nonskid shoes/slippers when out of bed;other (see comments)   exit alarm  -AS fall prevention program maintained;gait belt  -SC fall prevention program maintained;gait belt;nonskid shoes/slippers when out of bed  -AC    Recorded by [AS] Yamel Rashid PTA [SC] Thierry Merchant, PT [AC] Mercedes Shah, OT    Bed Mobility, Assessment/Treatment    Bed Mobility, Assistive Device head of bed elevated;bed rails   -AS bed rails  -SC bed rails;head of bed elevated  -AC    Bed Mob, Supine to Sit, [place] supervision required  -AS supervision required  -SC verbal cues required;contact guard assist  -AC    Bed Mob, Sit to Supine, [place] supervision required  -AS  verbal cues required;contact guard assist  -AC    Bed Mobility, Safety Issues   decreased use of arms for pushing/pulling;decreased use of legs for bridging/pushing  -AC    Bed Mobility, Impairments strength decreased  -AS strength decreased  -SC strength decreased;impaired balance  -AC    Bed Mobility, Comment good technique  -AS Up to edge of bed with good technique.  -SC multiple cues to log roll  -AC    Recorded by [AS] Yamel Rashid PTA [SC] Thierry Merchant, PT [AC] Mercedes Shah, OT    Transfer Assessment/Treatment    Transfers, Sit-Stand [place] contact guard assist  -AS supervision required;verbal cues required  -SC verbal cues required;contact guard assist  -AC    Transfers, Stand-Sit [place] contact guard assist  -AS supervision required;verbal cues required  -SC verbal cues required;contact guard assist  -AC    Transfers, Sit-Stand-Sit, Assist Device rolling walker  -AS rolling walker  -SC rolling walker  -AC    Toilet Transfer, [place]   contact guard assist  -AC    Toilet Transfer, Assistive Device   bedside commode without drop arms  -AC    Transfer, Safety Issues  balance decreased during turns  -SC     Transfer, Impairments  impaired balance;strength decreased  -SC strength decreased;impaired balance  -AC    Transfer, Comment LOB with initial stand needing assist to correct.  -AS VC for hand placement  -SC verbal cues for hand placement, to place brakes on rollator to sit and stand  -AC    Recorded by [AS] Yamel Rashid, PTA [SC] Thierry Merchant, PT [AC] Mercedes Shah, OT    Gait Assessment/Treatment    Gait, [place] Level verbal cues required;contact guard assist  -AS contact guard assist  -SC     Gait, Assistive  Device rollator  -AS rolling walker  -SC     Gait, Distance (Feet) 200  -  -SC     Gait, Gait Pattern Analysis  swing-through gait  -SC     Gait, Gait Deviations lin decreased;forward flexed posture;step length decreased  -AS forward flexed posture  -SC     Gait, Safety Issues step length decreased  -AS balance decreased during turns;weight-shifting ability decreased  -SC     Gait, Impairments strength decreased;impaired balance  -AS motor control impaired;strength decreased;impaired balance  -SC     Gait, Comment cues to stay close to rollator, pateint reported a decrease in left hip pain with activity  -AS cues for staying close to walker and to improve posture  -SC     Recorded by [AS] Yamel Rashid, PTA [SC] Thierry Merchant, PT     Functional Mobility    Functional Mobility- Ind. Level   verbal cues required;contact guard assist  -    Functional Mobility- Device   rollator  -    Functional Mobility-Distance (Feet)   15  -    Functional Mobility- Comment   pt agreeable to go to sink and back to bed d/t pain  -AC    Recorded by   [AC] Mercedes Shah OT    Upper Body Bathing Assessment/Training    UB Bathing Assess/Train, Position   standing;sink side  -    UB Bathing Assess/Train, [place] Level   set up required;supervision required  -AC    Recorded by   [AC] Mercedes Shah OT    Lower Body Bathing Assessment/Training    LB Bathing Assess/Train, Position   standing;sitting  -AC    LB Bathing Assess/Train, [place] Level   moderate assist (50% patient effort)  -    LB Bathing Assess/Train, Comment   pt able to wash upper legs and bottom, unable to use cross leg technique to wash lower legs  -AC    Recorded by   [AC] Mercedes Shah OT    Lower Body Dressing Assessment/Training    LB Dressing Assess/Train, Clothing Type   donning:;slipper socks   undergarment  -    LB Dressing Assess/Train, Position   standing;sitting  -AC    LB Dressing Assess/Train, [place]   verbal cues  required  -AC    LB Dressing Assess/Train, Comment   max assist to don socks as pt unable to complete using crossleg technique and deferred use of sockaide d/t cognition, mod for undergarment , pt able to pullup over knees and hips  -AC    Recorded by   [AC] Mercedes Shah, OT    Toileting Assessment/Training    Toileting Assess/Train, Assistive Device   bedside commode  -AC    Toileting Assess/Train, Position   sitting;standing  -AC    Toileting Assess/Train, [place] Level   supervision required;set up required  -AC    Toileting Assess/Train, Comment   pt completed hygiene and clothing mgmt with setup supervision  -AC    Recorded by   [AC] Mercedes Shah, OT    Grooming Assessment/Training    Grooming Assess/Train, Position   standing  -AC    Grooming Assess/Train, [place] Level   supervision required  -AC    Grooming Assess/Train, Comment   pt washed face, combed hair and brushed teeth given supervision.    -AC    Recorded by   [AC] Mercedes Shah, KEVYN    Balance Skills Training    Standing-Level of Assistance   Contact guard  -    Static Standing Balance Support   Right upper extremity supported;Left upper extremity supported  -    Standing-Balance Activities   --   bathing and grooming  -AC    Standing Balance # of Minutes   20  -AC    Gait Balance-Level of Assistance  Contact guard  -SC     Gait Balance Support  assistive device  -SC     Recorded by  [SC] Thierry Merchant, PT [AC] Mercedes Shah, OT    Therapy Exercises    Bilateral Lower Extremities AROM:;10 reps;supine;ankle pumps/circles;heel slides;hip abduction/adduction  -AS      Recorded by [AS] Yamel Rashid, RAYMOND      Positioning and Restraints    Pre-Treatment Position in bed  -AS in bed  -SC in bed  -    Post Treatment Position bed  -AS chair  -SC bed  -AC    In Bed supine;call light within reach;encouraged to call for assist;exit alarm on  -AS sitting;call light within reach;encouraged to call for assist  -SC supine;call light within  reach;encouraged to call for assist;exit alarm on;notified nsg  -AC    Recorded by [AS] Yamel Rashid, PTA [SC] Thierry Merchant, PT [AC] Mercedes Shah, OT      08/20/17 1410          Rehab Assessment/Intervention    Discipline physical therapist  -      Document Type therapy note (daily note)  -      Subjective Information agree to therapy;complains of;pain  -KL      Patient Effort, Rehab Treatment adequate  -KL      Recorded by [KL] Vesta Wilson, PT      Pain Assessment    Pain Assessment 0-10  -KL      Pain Score 10  -KL      Post Pain Score 8  -KL      Pain Location Back  -KL      Recorded by [KL] Vesta Wilson, PT      Cognitive Assessment/Intervention    Current Cognitive/Communication Assessment impaired  -KL      Follows Commands/Answers Questions 75% of the time;able to follow single-step instructions  -KL      Recorded by [KL] Vesta Wilson, PT      Bed Mobility, Assessment/Treatment    Bed Mob, Supine to Sit, [place] verbal cues required;contact guard assist  -      Bed Mob, Sit to Supine, [place] verbal cues required;contact guard assist  -KL      Recorded by [KL] Vesta Wilson, PT      Transfer Assessment/Treatment    Transfers, Sit-Stand [place] verbal cues required;contact guard assist  -KL      Transfers, Stand-Sit [place] verbal cues required;contact guard assist  -KL      Recorded by [KL] Vesta Wilson, PT      Gait Assessment/Treatment    Gait, [place] Level contact guard assist  -KL      Gait, Assistive Device rolling walker  -      Gait, Distance (Feet) 300  -KL      Gait, Comment impulsive, continuous VC's to stay inside walker  -      Recorded by [KL] Vesta Wilson, PT      Balance Skills Training    Training Strategies (Balance Skills) Refused balance activities and LE exercise - states she is too sore   -      Recorded by [KL] Vesta Wilson, PT      Therapy Exercises    Bilateral Lower Extremities --   refused  -KL       Recorded by [KL] Vesta Wilson, PT      Positioning and Restraints    Pre-Treatment Position in bed  -KL      Post Treatment Position bed  -KL      In Bed notified nsg;supine;call light within reach;encouraged to call for assist;exit alarm on;with family/caregiver   Pt instructed to stay in bed unless RN is notified   -KL      In Chair --   exit alarm was turned on  -KL      Recorded by [KL] Vesta Wilson, PT        User Key  (r) = Recorded By, (t) = Taken By, (c) = Cosigned By    Initials Name Effective Dates    SC Thierry Merchant, PT 06/19/15 -     AC Mercedes Shah, OT 06/23/15 -     KL Vesta Wilson, PT 05/18/15 -     AS Yamel Rashid, PTA 06/22/15 -                 IP PT Goals       08/22/17 1014 08/17/17 1318 08/16/17 1556    Bed Mobility PT LTG    Bed Mobility PT LTG, Date Established   08/16/17  -SJ    Bed Mobility PT LTG, Time to Achieve   2 wks  -SJ    Bed Mobility PT LTG, Activity Type   roll left/roll right;supine to sit/sit to supine  -SJ    Bed Mobility PT LTG, [place] Level   supervision required  -SJ    Bed Mobility PT LTG, Date Goal Reviewed 08/22/17  -AS      Bed Mobility PT LTG, Outcome goal ongoing  -AS goal ongoing  -DELORES (r) KR (t) DELORES (c) goal ongoing  -SJ    Transfer Training PT LTG    Transfer Training PT LTG, Date Established   08/16/17  -SJ    Transfer Training PT LTG, Time to Achieve   2 wks  -SJ    Transfer Training PT LTG, Activity Type   bed to chair /chair to bed;sit to stand/stand to sit  -SJ    Transfer Training PT LTG, [place] Level   supervision required  -SJ    Transfer Training PT LTG, Assist Device   walker, rolling  -SJ    Transfer Training PT  LTG, Date Goal Reviewed 08/22/17  -AS      Transfer Training PT LTG, Outcome goal ongoing  -AS goal ongoing  -DELORES (r) KR (t) DELORES (c) goal ongoing  -SJ    Gait Training PT LTG    Gait Training Goal PT LTG, Date Established   08/16/17  -SJ    Gait Training Goal PT LTG, Time to Achieve   2 wks  -SJ    Gait  Training Goal PT LTG, [place] Level   contact guard assist  -SJ    Gait Training Goal PT LTG, Assist Device   walker, rolling  -SJ    Gait Training Goal PT LTG, Distance to Achieve   500  -SJ    Gait Training Goal PT LTG, Date Goal Reviewed 08/22/17  -AS      Gait Training Goal PT LTG, Outcome goal ongoing  -AS goal ongoing  -DELORES (r) KR (t) DELORES (c) goal ongoing  -SJ      User Key  (r) = Recorded By, (t) = Taken By, (c) = Cosigned By    Initials Name Provider Type    DELORES Lolis Motta, PT Physical Therapist    SJ Valentina Corona, PT Physical Therapist    AS Yamel Rashid, PTA Physical Therapy Assistant    KR Clotilde Naqvi, PT Student PT Student          Physical Therapy Education     Title: PT OT SLP Therapies (Active)     Topic: Physical Therapy (Active)     Point: Mobility training (Active)    Learning Progress Summary    Learner Readiness Method Response Comment Documented by Status   Patient Acceptance E NR  AS 08/22/17 1014 Active    Eager E VU,DU cues for safety with mobility SC 08/21/17 1145 Done    Acceptance E NR   08/20/17 1521 Active    Acceptance E,D NR   08/18/17 1522 Active    Acceptance E NR   08/17/17 1318 Active    Acceptance E NR   08/16/17 1559 Active               Point: Home exercise program (Active)    Learning Progress Summary    Learner Readiness Method Response Comment Documented by Status   Patient Acceptance E NR  AS 08/22/17 1014 Active    Eager E VU,DU cues for safety with mobility SC 08/21/17 1145 Done    Acceptance E NR   08/20/17 1521 Active    Acceptance E,D NR   08/18/17 1522 Active    Acceptance E NR   08/16/17 1559 Active               Point: Body mechanics (Active)    Learning Progress Summary    Learner Readiness Method Response Comment Documented by Status   Patient Acceptance E NR  AS 08/22/17 1014 Active    Eager E VU,DU cues for safety with mobility SC 08/21/17 1145 Done    Acceptance E NR   08/20/17 1521 Active    Acceptance E,D NR   08/18/17  1522 Active    Acceptance E NR  KR 08/17/17 1318 Active    Acceptance E NR  SJ 08/16/17 1559 Active               Point: Precautions (Active)    Learning Progress Summary    Learner Readiness Method Response Comment Documented by Status   Patient Acceptance E NR  AS 08/22/17 1014 Active    Eager E HERB DERAS cues for safety with mobility SC 08/21/17 1145 Done    Acceptance E NR  KL 08/20/17 1521 Active    Acceptance E,D NR  SJ 08/18/17 1522 Active    Acceptance E NR  KR 08/17/17 1318 Active    Acceptance E NR  SJ 08/16/17 1559 Active                      User Key     Initials Effective Dates Name Provider Type Discipline    SC 06/19/15 -  Thierry Merchant, PT Physical Therapist PT     06/19/15 -  Valentina Corona, PT Physical Therapist PT     05/18/15 -  Vesta Wilson, PT Physical Therapist PT    AS 06/22/15 -  Yamel Rashid, PTA Physical Therapy Assistant PT     05/30/17 -  Clotilde Naqvi, PT Student PT Student PT                    PT Recommendation and Plan  Anticipated Discharge Disposition: inpatient rehabilitation facility  Planned Therapy Interventions: balance training, bed mobility training, gait training, home exercise program, patient/family education, strengthening, transfer training  PT Frequency: daily  Plan of Care Review  Plan Of Care Reviewed With: patient  Progress: progress toward functional goals as expected  Outcome Summary/Follow up Plan: patient needs verbal cues for safety with rolling walker and transfers as pateint stand prior to having walker in front and locked.          Outcome Measures       08/22/17 0944 08/21/17 1030 08/21/17 0840    How much help from another person do you currently need...    Turning from your back to your side while in flat bed without using bedrails? 4  -AS 4  -SC     Moving from lying on back to sitting on the side of a flat bed without bedrails? 4  -AS 4  -SC     Moving to and from a bed to a chair (including a wheelchair)? 3  -AS 3  -SC     Standing up  from a chair using your arms (e.g., wheelchair, bedside chair)? 3  -AS 3  -SC     Climbing 3-5 steps with a railing? 3  -AS 3  -SC     To walk in hospital room? 3  -AS 3  -SC     AM-PAC 6 Clicks Score 20  -AS 20  -SC     How much help from another is currently needed...    Putting on and taking off regular lower body clothing?   2  -AC    Bathing (including washing, rinsing, and drying)   2  -AC    Toileting (which includes using toilet bed pan or urinal)   3  -AC    Putting on and taking off regular upper body clothing   3  -AC    Taking care of personal grooming (such as brushing teeth)   4  -AC    Eating meals   4  -AC    Score   18  -AC    Functional Assessment    Outcome Measure Options AM-PAC 6 Clicks Basic Mobility (PT)  -AS AM-PAC 6 Clicks Basic Mobility (PT)  -SC AM-PAC 6 Clicks Daily Activity (OT)  -AC      User Key  (r) = Recorded By, (t) = Taken By, (c) = Cosigned By    Initials Name Provider Type    SC Thierry Merchant, PT Physical Therapist    AC Mercedes Shah, OT Occupational Therapist    AS Yamel Rashid PTA Physical Therapy Assistant           Time Calculation:         PT Charges       08/22/17 1015          Time Calculation    Start Time 0944  -AS      PT Received On 08/22/17  -AS      PT Goal Re-Cert Due Date 08/26/17  -AS      Time Calculation- PT    Total Timed Code Minutes- PT 23 minute(s)  -AS        User Key  (r) = Recorded By, (t) = Taken By, (c) = Cosigned By    Initials Name Provider Type    AS Yamel Rashid PTA Physical Therapy Assistant          Therapy Charges for Today     Code Description Service Date Service Provider Modifiers Qty    29460501149 HC GAIT TRAINING EA 15 MIN 8/22/2017 Yamel Rashid PTA GP 1    89502182929 HC PT THER PROC EA 15 MIN 8/22/2017 Yamel Rashid PTA GP 1          PT G-Codes  Outcome Measure Options: AM-PAC 6 Clicks Basic Mobility (PT)    Yamel Rashid PTA  8/22/2017

## 2017-08-22 NOTE — PROGRESS NOTES
Continued Stay Note  [facility]     Patient Name: Paulina Johnson  MRN: [id number]  Today's Date: 8/22/2017    Admit Date: 8/15/2017          Discharge Plan       08/22/17 1104    Case Management/Social Work Plan    Plan [facility]    Patient/Family In Agreement With Plan yes    Additional Comments Spoke with Brandie in admissions at [facility], they have offered Mrs. Johnson a skilled bed for rehab.  Pt is agreeable to this and is medically ready for discharge.  CM spoke to patient's spouse, Arpit and is agreeable and able to transport patient today.  Nurse will need to call report to [phone] and ask for [facility].  Please send transfer summary and hard scripts with patient.  No other discharge needs, please call ext. [phone] with any questions.               Discharge Codes       08/22/17 1108    Discharge Codes    Discharge Codes 83  R- To skilled nursing facility        Expected Discharge Date and Time     Expected Discharge Date Expected Discharge Time    Aug 22, 2017             Alea Mitchell RN

## 2017-08-22 NOTE — PLAN OF CARE
Problem: Patient Care Overview (Adult)  Goal: Plan of Care Review  Outcome: Ongoing (interventions implemented as appropriate)    08/22/17 1014   Coping/Psychosocial Response Interventions   Plan Of Care Reviewed With patient   Patient Care Overview   Progress progress toward functional goals as expected   Outcome Evaluation   Outcome Summary/Follow up Plan patient needs verbal cues for safety with rolling walker and transfers as pateint stand prior to having walker in front and locked.         Problem: Inpatient Physical Therapy  Goal: Bed Mobility Goal LTG- PT  Outcome: Ongoing (interventions implemented as appropriate)    08/16/17 1556 08/22/17 1014   Bed Mobility PT LTG   Bed Mobility PT LTG, Date Established 08/16/17 --    Bed Mobility PT LTG, Time to Achieve 2 wks --    Bed Mobility PT LTG, Activity Type roll left/roll right;supine to sit/sit to supine --    Bed Mobility PT LTG, [place] Level supervision required --    Bed Mobility PT LTG, Date Goal Reviewed --  08/22/17   Bed Mobility PT LTG, Outcome --  goal ongoing       Goal: Transfer Training Goal 1 LTG- PT  Outcome: Ongoing (interventions implemented as appropriate)    08/16/17 1556 08/22/17 1014   Transfer Training PT LTG   Transfer Training PT LTG, Date Established 08/16/17 --    Transfer Training PT LTG, Time to Achieve 2 wks --    Transfer Training PT LTG, Activity Type bed to chair /chair to bed;sit to stand/stand to sit --    Transfer Training PT LTG, [place] Level supervision required --    Transfer Training PT LTG, Assist Device walker, rolling --    Transfer Training PT LTG, Date Goal Reviewed --  08/22/17   Transfer Training PT LTG, Outcome --  goal ongoing       Goal: Gait Training Goal LTG- PT  Outcome: Ongoing (interventions implemented as appropriate)    08/16/17 1556 08/22/17 1014   Gait Training PT LTG   Gait Training Goal PT LTG, Date Established 08/16/17 --    Gait Training Goal PT LTG, Time to Achieve 2 wks --    Gait  Training Goal PT LTG, [place] Level contact guard assist --    Gait Training Goal PT LTG, Assist Device walker, rolling --    Gait Training Goal PT LTG, Distance to Achieve 500 --    Gait Training Goal PT LTG, Date Goal Reviewed --  08/22/17   Gait Training Goal PT LTG, Outcome --  goal ongoing

## 2017-08-22 NOTE — DISCHARGE PLACEMENT REQUEST
"Alea Mitchell, RN Case Manager [phone]      Alex Howard J (71 y.o. Female)     Date of Birth Social Security Number Address Home Phone MRN    1945  [id number] [place] [id number] [phone] [id number]    Buddhism Marital Status          Congregational        Admission Date Admission Type Admitting Provider Attending Provider Department, Room/Bed    8/15/17 Emergency Tai Estrada MD Khamvanthong, Phonekeo, MD [facility] 4H, S473/1    Discharge Date Discharge Disposition Discharge Destination         Rehab Facility or Unit (DC - External)             Attending Provider: Tai Estrada MD     Allergies:  Lortab [Hydrocodone-acetaminophen], Oxycontin [Oxycodone Hcl], Azithromycin, Ciprofloxacin, Daypro [Oxaprozin], Penicillins    Isolation:  None   Infection:  None   Code Status:  FULL    Ht:  62\" (157.5 cm)   Wt:  126 lb 3.2 oz (57.2 kg)    Admission Cmt:  None   Principal Problem:  Type 2 diabetes mellitus with ketoacidosis without coma [E13.10]                 Active Insurance as of 8/15/2017     Primary Coverage     Payor Plan Insurance Group Employer/Plan Group    MEDICARE MEDICARE A & B      Payor Plan Address Payor Plan Phone Number Effective From Effective To    [address] [phone] 10/1/2009     [place] [address]       Subscriber Name Subscriber Birth Date Member ID       HOWARD CAROL[place] 1945 [id number]           Secondary Coverage     Payor Plan Insurance Group Employer/Plan Group    HUMANA HUMANA SUPPLEMENT [id number]     Payor Plan Address Payor Plan Phone Number Effective From Effective To    [address] [address]  8/1/2010     [place] [address]       Subscriber Name Subscriber Birth Date Member ID       HOWARD CAROLINA 1945 [id number]                 Emergency Contacts      (Rel.) Home Phone Work Phone Mobile Phone    Arpit Carolina (Spouse) [phone] -- --               Discharge Summary      Susie RESTREPO" WAQAR Covarrubias at 8/22/2017 10:31 AM              [facility] Medicine Services  DISCHARGE SUMMARY       Date of Admission: 8/15/2017  Date of Discharge:  8/22/2017  Primary Care Physician: Hernan Thompson MD    Discharge Diagnoses:  Active Hospital Problems (** Indicates Principal Problem)    Diagnosis Date Noted   • **Type 2 diabetes mellitus with ketoacidosis without coma [E13.10] 08/15/2017   • S/P right L4-5 laminotomy with medial facetectomy, foraminotomy and L4-5 diskectomy [Z98.890] 12/14/2016   • Hypothyroid [E03.9] 12/14/2016   • HTN (hypertension) [I10] 12/14/2016   • Hyperlipidemia [E78.5] 12/14/2016   • H/O: stroke [Z86.73] 12/14/2016      Resolved Hospital Problems    Diagnosis Date Noted Date Resolved   • Acute on chronic kidney failure [N17.9, N18.9] 08/16/2017 08/22/2017   • Non-intractable vomiting with nausea [R11.2] 08/16/2017 08/22/2017   • DKA (diabetic ketoacidoses) [E13.10] 08/15/2017 08/16/2017   • Lumbar disc herniation [M51.26] 12/14/2016 08/16/2017   • DM (diabetes mellitus) [E11.9] 12/14/2016 08/16/2017     Presenting Problem/History of Present Illness  Type 2 diabetes mellitus with ketoacidosis without coma, unspecified long term insulin use status [E13.10]     History of Present Illness on Day of Discharge:   No issues today. She complains of L hip pain. Denies chest pain, dyspnea, N/V/D or constipation. She is eating well.     Hospital Course  Ms. Paulina Johnson is a 72yo female with a history of HTN, hyperlipidemia, hypothyroidism, prior CVA with residual right upper extremity and lower extremity weakness, multiple discectomies of L4-L5 (12/2016, 4/2017 and 7/2017) and insulin-dependent DMII who was sent to [facility] ED by her primary care provider with DKA and glucose of 942.  She reported being hyperglycemic at home with blood glucose of 500-600 over the past week.  Since her back surgery in July 2016, she reported chronic nausea and  vomiting with meals.  She reported a 40 pound weight loss over the past 3 months with persistent, severe back pain and sciatic pain radiating down her right leg.    She was started on insulin drip and admitted to [facility] ICU.  Her hemoglobin A1c was found to be 11%.  She follows with [facility] in [place], [place]. Blood sugars improved during this hospitalization. Please note that Ms. Johnson's DM has been very brittle during this stay. She is requiring high-doses of Levemir. She has been very sensitive to short-acting insulins. Administer insulin carefully and monitor blood glucose closely. Please contact Dr. Robertson' office with concerns regarding blood glucose. [phone]    During this hospitalization the patient did have intermittent confusion and agitation.  She was started on scheduled Seroquel each night and her mentation did improve some.  Suspect that the patient does have an element of underlying dementia.  Recommend that the patient follow-up with neurology as an outpatient for a full evaluation.    Patient's TSH was elevated during this hospitalization at 9.  Free T4 was normal however.  No change to Synthroid dosage.  Recommend recheck in 4-6 weeks with PCP.    Iron and folate supplements added for acute versus chronic anemia.  She was guaiac negative.  Further evaluation can be performed on an outpatient basis.    Patient will transfer to acute rehabilitation in stable condition on 8/22/17. Blood sugar is controlled. Her nausea and vomiting have resolved. She continues to report L hip pain.   Follow-up with PCP 1 week after discharge from rehabilitation.  Recommend referral for outpatient neurologic evaluation.  Follow-up appointment has been scheduled with her endocrinology group for Tuesday 9/12 at 10:40 AM.     Pertinent Test Results:    Results from last 7 days  Lab Units 08/22/17  0453 08/21/17  0757 08/20/17  1108   WBC 10*3/mm3 6.02  6.18 6.63   HEMOGLOBIN g/dL 8.3* 9.0* 8.8*   HEMATOCRIT % 26.9* 29.3* 28.7*   PLATELETS 10*3/mm3 450 425 387     Results from last 7 days  Lab Units 08/22/17  0453 08/21/17  0757 08/20/17  1108   SODIUM mmol/L 139 137 134   POTASSIUM mmol/L 4.1 4.2 4.3   CHLORIDE mmol/L 109 108 106   CO2 mmol/L 25.0 22.0 20.0   BUN mg/dL 26* 18 20   CREATININE mg/dL 1.00 1.00 1.20   GLUCOSE mg/dL 85 160* 456*   CALCIUM mg/dL 9.0 9.0 8.8     Imaging Results (all)     Procedure Component Value Units Date/Time    XR Chest 1 View [[id number]]  (Abnormal) Collected:  08/1945     Updated:  08/15/17 2214    Narrative:       EXAM:    XR Chest, 1 View    CLINICAL HISTORY:    71 years old, female; Other specified diabetes mellitus with ketoacidosis   without coma; Acute kidney failure, unspecified; Nausea with vomiting,   unspecified; Disorientation, unspecified; Condition or disease; Other: Dka    TECHNIQUE:    Frontal view of the chest.    COMPARISON:    No relevant prior studies available.    FINDINGS:    Lungs:  No lobar consolidation or significant effusions.    Pleural space:  Unremarkable.  No pneumothorax.    Heart:  Unremarkable.  No cardiomegaly.    Mediastinum:  Unremarkable.    Bones/joints:  Unremarkable.    Vasculature:  Moderate to moderately severe central and basilar vascular   congestion.  Atherosclerotic disease.    Other findings:  Anterior cervical fusion with screw and plate device in   place.      Impression:       1.  Moderate to moderately severe central and basilar vascular congestion.  2.  No lobar consolidation or significant effusions.    THIS DOCUMENT HAS BEEN ELECTRONICALLY SIGNED BY MARTIR HEARN MD    XR Chest PA & Lateral [[id number]] Collected:  08/16/17 1443     Updated:  08/16/17 1600    Narrative:       EXAMINATION: XR CHEST PA AND LATERAL- 08/16/2017     INDICATION: evaluate fullness along right heart border; E13.10-Other  specified diabetes mellitus with ketoacidosis without coma; R11.2-Nausea  with  vomiting, unspecified; R41.0-Disorientation, unspecified;  N17.9-Acute kidney failure, unspecified      COMPARISON: Evaluate fullness along the right heart border     FINDINGS: PA and lateral views of the chest reveal some improvement seen  in the aeration of the perihilar regions bilaterally. There is  prominence again seen in the perihilar regions bilaterally with  bronchial cuffing. There is multiple calcified granulomas extending into  the right upper lobe. No pleural effusion or pneumothorax. Degenerative  changes seen within the spine.           Impression:       Some improvement seen in the appearance of the perihilar  regions bilaterally. Prominence remains. Bronchial cuffing suggesting  possibly a viral bronchiolitis or perihilar infiltrate. Continued  follow-up is recommended to resolution.     D:  08/16/2017  E:  08/16/2017     This report was finalized on 8/16/2017 3:57 PM by Dr. Jigna Schreiber MD.       NM Gastric Emptying [[id number]] Collected:  08/16/17 1918     Updated:  08/16/17 2328    Narrative:       EXAMINATION: NM GASTRIC EMPTYING - 08/16/2017     INDICATION:  E13.10-Other specified diabetes mellitus with ketoacidosis  without coma; R11.2-Nausea with vomiting, unspecified;  R41.0-Disorientation, unspecified; N17.9-Acute kidney failure,  unspecified; Z74.09-Other reduced mobility.     RADIOPHARMACEUTICAL: 1.07 mCi of Technetium 99m sulfur colloid mixed  with oatmeal, PO.     COMPARISON: None.     FINDINGS: There is a somewhat unusual gastric emptying curve, but  reviewing the filmed images, the curve appears to be accurate. There is  a protracted lag phase, and then rather dramatic emptying of the  stomach, with 95% emptying over the next 30 minutes.  T-1/2 half is  calculated at 39 minutes .       Impression:       Prolonged lag phase and then marked, rapid gastric emptying,  as noted above. T-1/2 of 39 minutes and total emptying of 95% at 90  minutes is well within expected limits.      DICTATED:     08/16/2017  EDITED:         08/16/2017     This report was finalized on 8/16/2017 11:26 PM by DR. Melvin Cosme MD.       US Renal Limited [[id number]] Collected:  08/16/17 1641     Updated:  08/17/17 1328    Narrative:       EXAMINATION: US RENAL, LIMITED-08/16/2017:     INDICATION: A/CKD, R/O obstruction, evaluate renal size; E13.10-Other  specified diabetes mellitus with ketoacidosis without coma; R11.2-Nausea  with vomiting, unspecified; R41.0-Disorientation, unspecified;  N17.9-Acute kidney failure, unspecified, renal insufficiency.     TECHNIQUE: Sonographic imaging was obtained of the kidneys in both the  sagittal and transverse planes.     COMPARISON: NONE.     FINDINGS: There is dilatation identified of the renal pelvis. Findings  may suggest an extrarenal pelvis or possibly mild obstruction, however,  there is no dilatation of the calyces. The cortex is intact. The right  kidney measures in length from pole to pole 10.6 cm. No solid mass or  renal cortical cyst identified.     The left kidney measures in length from pole to pole 9.6 cm. There is  dilatation of the renal pelvis with no dilatation of the calyces.  Findings again may suggest an extrarenal pelvis or possibly mild  dilatation. The bladder is distended.       Impression:       Distention of the bladder. There is dilatation of the renal  pelvises bilaterally suggesting mild obstruction versus extrarenal  pelvises. No evidence of calyceal dilatation.      D:  08/16/2017  E:  08/16/2017     This report was finalized on 8/17/2017 1:26 PM by Dr. Jigna Schreiber MD        Condition on Discharge: Stable, improved    Temp:  [97 °F (36.1 °C)-98.4 °F (36.9 °C)] 97 °F (36.1 °C)  Heart Rate:  [] 88  Resp:  [16-18] 18  BP: (133-164)/(61-85) 145/77    Physical Exam   Constitutional: no acute distress, awake, alert, conversant. Upright in bed, make up on.   HENT: NCAT  Respiratory: Clear to auscultation bilaterally, respiratory effort  normal   Cardiovascular: RRR, no murmurs, rubs, or gallops, palpable pedal pulses bilaterally  Gastrointestinal: Positive bowel sounds, soft, nontender, nondistended  Musculoskeletal: No bilateral ankle edema. L flank with healing ecchymosis, mild tenderness to palpation  Psychiatric: oriented x 3, appropriate affect, cooperative  Neurologic: Strength symmetric in all extremities     Discharge Disposition  Rehab Facility or Unit (DC - External)    Discharge Medications   Paulina Johnson   Home Medication Instructions MAURO:[id number]    Printed on:08/22/17 1100   Medication Information                      aspirin 81 MG EC tablet  Take 81 mg by mouth Daily.             clopidogrel (PLAVIX) 75 MG tablet  Take 1 tablet by mouth Daily. Resume in 1 week.             cyclobenzaprine (FLEXERIL) 10 MG tablet  Take 1 tablet by mouth 3 (Three) Times a Day As Needed for Muscle Spasms.             docusate sodium (COLACE) 100 MG capsule  Take 1 capsule by mouth Daily As Needed for Constipation.             DULoxetine (CYMBALTA) 30 MG capsule  Take 1 capsule by mouth Daily.             famotidine (PEPCID) 20 MG tablet  Take 1 tablet by mouth 2 (Two) Times a Day.             ferrous sulfate 325 (65 FE) MG tablet  Take 1 tablet by mouth Daily With Breakfast.             folic acid (FOLVITE) 1 MG tablet  Take 0.5 tablets by mouth Daily.             furosemide (LASIX) 40 MG tablet  Take 40 mg by mouth Daily.             ibuprofen (ADVIL,MOTRIN) 200 MG tablet  Take 3 tablets by mouth Every 6 (Six) Hours As Needed for Mild Pain .             insulin aspart (novoLOG) 100 UNIT/ML injection  Inject 0-9 Units under the skin 3 (Three) Times a Day Before Meals.             insulin detemir (LEVEMIR) 100 UNIT/ML injection  Inject 20 Units under the skin 2 (Two) Times a Day.             levothyroxine (SYNTHROID, LEVOTHROID) 50 MCG tablet  Take 1 tablet by mouth Daily.             polyethylene glycol (MIRALAX) powder  Take 17 g by mouth  Daily As Needed (constipation).             QUEtiapine (SEROquel) 25 MG tablet  Take 1 tablet by mouth Every Night.             raloxifene (EVISTA) 60 MG tablet  Take 60 mg by mouth Daily.             simvastatin (ZOCOR) 20 MG tablet  Take 20 mg by mouth Daily.             valsartan (DIOVAN) 160 MG tablet  Take 160 mg by mouth Daily.             vitamin C (VITAMIN C) 500 MG tablet  Take 1 tablet by mouth Daily.               Diet Instructions     Diet: Regular, Consistent Carbohydrate, Cardiac; Thin       Discharge Diet:   Regular  Consistent Carbohydrate  Cardiac      Fluid Consistency:  Thin               Activity Instructions     Activity as Tolerated                   Future Appointments  Date Time Provider Department Center   8/29/2017 2:00 PM WAQAR Mclaughlin [facility] None   9/12/2017 2:30 PM MD BRENDA Rodriguez [facility] None     Susie Covarrubias PA-C 08/22/17 11:00 AM    Time: Discharge 45 min    Please note that portions of this note may have been completed with a voice recognition program. Efforts were made to edit the dictations, but occasionally words are mistranscribed.             Electronically signed by Susie Covarrubias PA-C at 8/22/2017 11:03 AM

## 2017-08-22 NOTE — DISCHARGE SUMMARY
[facility] Hospital Medicine Services  DISCHARGE SUMMARY       Date of Admission: 8/15/2017  Date of Discharge:  8/22/2017  Primary Care Physician: Hernan Thompson MD    Discharge Diagnoses:  Active Hospital Problems (** Indicates Principal Problem)    Diagnosis Date Noted   • **Type 2 diabetes mellitus with ketoacidosis without coma [E13.10] 08/15/2017   • S/P right L4-5 laminotomy with medial facetectomy, foraminotomy and L4-5 diskectomy [Z98.890] 12/14/2016   • Hypothyroid [E03.9] 12/14/2016   • HTN (hypertension) [I10] 12/14/2016   • Hyperlipidemia [E78.5] 12/14/2016   • H/O: stroke [Z86.73] 12/14/2016      Resolved Hospital Problems    Diagnosis Date Noted Date Resolved   • Acute on chronic kidney failure [N17.9, N18.9] 08/16/2017 08/22/2017   • Non-intractable vomiting with nausea [R11.2] 08/16/2017 08/22/2017   • DKA (diabetic ketoacidoses) [E13.10] 08/15/2017 08/16/2017   • Lumbar disc herniation [M51.26] 12/14/2016 08/16/2017   • DM (diabetes mellitus) [E11.9] 12/14/2016 08/16/2017     Presenting Problem/History of Present Illness  Type 2 diabetes mellitus with ketoacidosis without coma, unspecified long term insulin use status [E13.10]     History of Present Illness on Day of Discharge:   No issues today. She complains of L hip pain. Denies chest pain, dyspnea, N/V/D or constipation. She is eating well.     Hospital Course  Ms. Paulina Johnson is a 72yo female with a history of HTN, hyperlipidemia, hypothyroidism, prior CVA with residual right upper extremity and lower extremity weakness, multiple discectomies of L4-L5 (12/2016, 4/2017 and 7/2017) and insulin-dependent DMII who was sent to [facility] ED by her primary care provider with DKA and glucose of 942.  She reported being hyperglycemic at home with blood glucose of 500-600 over the past week.  Since her back surgery in July 2016, she reported chronic nausea and vomiting with meals.  She reported a 40 pound  weight loss over the past 3 months with persistent, severe back pain and sciatic pain radiating down her right leg.    She was started on insulin drip and admitted to [facility] ICU.  Her hemoglobin A1c was found to be 11%.  She follows with [facility] in [place], [place]. Blood sugars improved during this hospitalization. Please note that Ms. Johnson's DM has been very brittle during this stay. She is requiring high-doses of Levemir. She has been very sensitive to short-acting insulins. Administer insulin carefully and monitor blood glucose closely. Please contact Dr. Robertson' office with concerns regarding blood glucose. [phone]    During this hospitalization the patient did have intermittent confusion and agitation.  She was started on scheduled Seroquel each night and her mentation did improve some.  Suspect that the patient does have an element of underlying dementia.  Recommend that the patient follow-up with neurology as an outpatient for a full evaluation.    Patient's TSH was elevated during this hospitalization at 9.  Free T4 was normal however.  No change to Synthroid dosage.  Recommend recheck in 4-6 weeks with PCP.    Iron and folate supplements added for acute versus chronic anemia.  She was guaiac negative.  Further evaluation can be performed on an outpatient basis.    Patient will transfer to acute rehabilitation in stable condition on 8/22/17. Blood sugar is controlled. Her nausea and vomiting have resolved. She continues to report L hip pain.   Follow-up with PCP 1 week after discharge from rehabilitation.  Recommend referral for outpatient neurologic evaluation.  Follow-up appointment has been scheduled with her endocrinology group for Tuesday 9/12 at 10:40 AM.     Pertinent Test Results:    Results from last 7 days  Lab Units 08/22/17  0453 08/21/17  0757 08/20/17  1108   WBC 10*3/mm3 6.02 6.18 6.63   HEMOGLOBIN g/dL 8.3* 9.0* 8.8*    HEMATOCRIT % 26.9* 29.3* 28.7*   PLATELETS 10*3/mm3 450 425 387     Results from last 7 days  Lab Units 08/22/17  0453 08/21/17  0757 08/20/17  1108   SODIUM mmol/L 139 137 134   POTASSIUM mmol/L 4.1 4.2 4.3   CHLORIDE mmol/L 109 108 106   CO2 mmol/L 25.0 22.0 20.0   BUN mg/dL 26* 18 20   CREATININE mg/dL 1.00 1.00 1.20   GLUCOSE mg/dL 85 160* 456*   CALCIUM mg/dL 9.0 9.0 8.8     Imaging Results (all)     Procedure Component Value Units Date/Time    XR Chest 1 View [[id number]]  (Abnormal) Collected:  08/1945     Updated:  08/15/17 2214    Narrative:       EXAM:    XR Chest, 1 View    CLINICAL HISTORY:    71 years old, female; Other specified diabetes mellitus with ketoacidosis   without coma; Acute kidney failure, unspecified; Nausea with vomiting,   unspecified; Disorientation, unspecified; Condition or disease; Other: Dka    TECHNIQUE:    Frontal view of the chest.    COMPARISON:    No relevant prior studies available.    FINDINGS:    Lungs:  No lobar consolidation or significant effusions.    Pleural space:  Unremarkable.  No pneumothorax.    Heart:  Unremarkable.  No cardiomegaly.    Mediastinum:  Unremarkable.    Bones/joints:  Unremarkable.    Vasculature:  Moderate to moderately severe central and basilar vascular   congestion.  Atherosclerotic disease.    Other findings:  Anterior cervical fusion with screw and plate device in   place.      Impression:       1.  Moderate to moderately severe central and basilar vascular congestion.  2.  No lobar consolidation or significant effusions.    THIS DOCUMENT HAS BEEN ELECTRONICALLY SIGNED BY MARTIR HEARN MD    XR Chest PA & Lateral [[id number]] Collected:  08/16/17 1443     Updated:  08/16/17 1600    Narrative:       EXAMINATION: XR CHEST PA AND LATERAL- 08/16/2017     INDICATION: evaluate fullness along right heart border; E13.10-Other  specified diabetes mellitus with ketoacidosis without coma; R11.2-Nausea  with vomiting, unspecified;  R41.0-Disorientation, unspecified;  N17.9-Acute kidney failure, unspecified      COMPARISON: Evaluate fullness along the right heart border     FINDINGS: PA and lateral views of the chest reveal some improvement seen  in the aeration of the perihilar regions bilaterally. There is  prominence again seen in the perihilar regions bilaterally with  bronchial cuffing. There is multiple calcified granulomas extending into  the right upper lobe. No pleural effusion or pneumothorax. Degenerative  changes seen within the spine.           Impression:       Some improvement seen in the appearance of the perihilar  regions bilaterally. Prominence remains. Bronchial cuffing suggesting  possibly a viral bronchiolitis or perihilar infiltrate. Continued  follow-up is recommended to resolution.     D:  08/16/2017  E:  08/16/2017     This report was finalized on 8/16/2017 3:57 PM by Dr. Jigna Schreiber MD.       NM Gastric Emptying [[id number]] Collected:  08/16/17 1918     Updated:  08/16/17 2328    Narrative:       EXAMINATION: NM GASTRIC EMPTYING - 08/16/2017     INDICATION:  E13.10-Other specified diabetes mellitus with ketoacidosis  without coma; R11.2-Nausea with vomiting, unspecified;  R41.0-Disorientation, unspecified; N17.9-Acute kidney failure,  unspecified; Z74.09-Other reduced mobility.     RADIOPHARMACEUTICAL: 1.07 mCi of Technetium 99m sulfur colloid mixed  with oatmeal, PO.     COMPARISON: None.     FINDINGS: There is a somewhat unusual gastric emptying curve, but  reviewing the filmed images, the curve appears to be accurate. There is  a protracted lag phase, and then rather dramatic emptying of the  stomach, with 95% emptying over the next 30 minutes.  T-1/2 half is  calculated at 39 minutes .       Impression:       Prolonged lag phase and then marked, rapid gastric emptying,  as noted above. T-1/2 of 39 minutes and total emptying of 95% at 90  minutes is well within expected limits.     DICTATED:      08/16/2017  EDITED:         08/16/2017     This report was finalized on 8/16/2017 11:26 PM by DR. Melvin Cosme MD.       US Renal Limited [[id number]] Collected:  08/16/17 1641     Updated:  08/17/17 1328    Narrative:       EXAMINATION: US RENAL, LIMITED-08/16/2017:     INDICATION: A/CKD, R/O obstruction, evaluate renal size; E13.10-Other  specified diabetes mellitus with ketoacidosis without coma; R11.2-Nausea  with vomiting, unspecified; R41.0-Disorientation, unspecified;  N17.9-Acute kidney failure, unspecified, renal insufficiency.     TECHNIQUE: Sonographic imaging was obtained of the kidneys in both the  sagittal and transverse planes.     COMPARISON: NONE.     FINDINGS: There is dilatation identified of the renal pelvis. Findings  may suggest an extrarenal pelvis or possibly mild obstruction, however,  there is no dilatation of the calyces. The cortex is intact. The right  kidney measures in length from pole to pole 10.6 cm. No solid mass or  renal cortical cyst identified.     The left kidney measures in length from pole to pole 9.6 cm. There is  dilatation of the renal pelvis with no dilatation of the calyces.  Findings again may suggest an extrarenal pelvis or possibly mild  dilatation. The bladder is distended.       Impression:       Distention of the bladder. There is dilatation of the renal  pelvises bilaterally suggesting mild obstruction versus extrarenal  pelvises. No evidence of calyceal dilatation.      D:  08/16/2017  E:  08/16/2017     This report was finalized on 8/17/2017 1:26 PM by Dr. Jigna Schreiber MD        Condition on Discharge: Stable, improved    Temp:  [97 °F (36.1 °C)-98.4 °F (36.9 °C)] 97 °F (36.1 °C)  Heart Rate:  [] 88  Resp:  [16-18] 18  BP: (133-164)/(61-85) 145/77    Physical Exam   Constitutional: no acute distress, awake, alert, conversant. Upright in bed, make up on.   HENT: NCAT  Respiratory: Clear to auscultation bilaterally, respiratory effort normal    Cardiovascular: RRR, no murmurs, rubs, or gallops, palpable pedal pulses bilaterally  Gastrointestinal: Positive bowel sounds, soft, nontender, nondistended  Musculoskeletal: No bilateral ankle edema. L flank with healing ecchymosis, mild tenderness to palpation  Psychiatric: oriented x 3, appropriate affect, cooperative  Neurologic: Strength symmetric in all extremities     Discharge Disposition  Rehab Facility or Unit (DC - External)    Discharge Medications   [facility]   Home Medication Instructions MAURO:[id number]    Printed on:08/22/17 1143   Medication Information                      aspirin 81 MG EC tablet  Take 81 mg by mouth Daily.             clopidogrel (PLAVIX) 75 MG tablet  Take 1 tablet by mouth Daily. Resume in 1 week.             cyclobenzaprine (FLEXERIL) 10 MG tablet  Take 1 tablet by mouth 3 (Three) Times a Day As Needed for Muscle Spasms.             docusate sodium (COLACE) 100 MG capsule  Take 1 capsule by mouth Daily As Needed for Constipation.             DULoxetine (CYMBALTA) 30 MG capsule  Take 1 capsule by mouth Daily.             famotidine (PEPCID) 20 MG tablet  Take 1 tablet by mouth 2 (Two) Times a Day.             ferrous sulfate 325 (65 FE) MG tablet  Take 1 tablet by mouth Daily With Breakfast.             folic acid (FOLVITE) 1 MG tablet  Take 0.5 tablets by mouth Daily.             furosemide (LASIX) 40 MG tablet  Take 40 mg by mouth Daily.             ibuprofen (ADVIL,MOTRIN) 200 MG tablet  Take 3 tablets by mouth Every 6 (Six) Hours As Needed for Mild Pain .             insulin aspart (novoLOG) 100 UNIT/ML injection  Inject 0-7 Units under the skin 3 (Three) Times a Day Before Meals.             insulin detemir (LEVEMIR) 100 UNIT/ML injection  Inject 20 Units under the skin 2 (Two) Times a Day.             levothyroxine (SYNTHROID, LEVOTHROID) 50 MCG tablet  Take 1 tablet by mouth Daily.             polyethylene glycol (MIRALAX) powder  Take 17 g by mouth Daily As  Needed (constipation).             QUEtiapine (SEROquel) 25 MG tablet  Take 1 tablet by mouth Every Night.             raloxifene (EVISTA) 60 MG tablet  Take 60 mg by mouth Daily.             simvastatin (ZOCOR) 20 MG tablet  Take 20 mg by mouth Daily.             valsartan (DIOVAN) 160 MG tablet  Take 160 mg by mouth Daily.             vitamin C (VITAMIN C) 500 MG tablet  Take 1 tablet by mouth Daily.               Diet Instructions     Diet: Regular, Consistent Carbohydrate, Cardiac; Thin       Discharge Diet:   Regular  Consistent Carbohydrate  Cardiac      Fluid Consistency:  Thin               Activity Instructions     Activity as Tolerated                   Future Appointments  Date Time Provider Department Center   8/29/2017 2:00 PM WAQAR Mclaughlin [facility] None   9/12/2017 2:30 PM MD BRENDA Rodriguez [facility] None     Susie Covarrubias PA-C 08/22/17 11:00 AM    Time: Discharge 45 min    Please note that portions of this note may have been completed with a voice recognition program. Efforts were made to edit the dictations, but occasionally words are mistranscribed.

## 2017-08-22 NOTE — PLAN OF CARE
Problem: Fall Risk (Adult)  Goal: Absence of Falls  Outcome: Ongoing (interventions implemented as appropriate)    08/22/17 0301   Fall Risk (Adult)   Absence of Falls making progress toward outcome

## 2017-09-12 ENCOUNTER — OFFICE VISIT (OUTPATIENT)
Dept: NEUROSURGERY | Facility: CLINIC | Age: 72
End: 2017-09-12

## 2017-09-12 VITALS — HEIGHT: 62 IN | TEMPERATURE: 96.4 F | BODY MASS INDEX: 25.95 KG/M2 | WEIGHT: 141 LBS

## 2017-09-12 DIAGNOSIS — M48.062 LUMBAR STENOSIS WITH NEUROGENIC CLAUDICATION: ICD-10-CM

## 2017-09-12 DIAGNOSIS — Z98.1 STATUS POST LUMBAR SPINAL FUSION: Primary | ICD-10-CM

## 2017-09-12 DIAGNOSIS — M51.26 RECURRENT HERNIATION OF LUMBAR DISC: ICD-10-CM

## 2017-09-12 DIAGNOSIS — M53.2X6 LUMBAR SPINE INSTABILITY: ICD-10-CM

## 2017-09-12 PROCEDURE — 99024 POSTOP FOLLOW-UP VISIT: CPT | Performed by: NEUROLOGICAL SURGERY

## 2017-09-12 NOTE — PROGRESS NOTES
Patient: Paulina Johnson  : 1945    Primary Care Provider: Hernan Thompson MD    Requesting Provider: As above        History    Chief Complaint: Back and leg pain.    History of Present Illness: Ms. Johnson is seen in follow-up.  She is a 71-year-old woman who had undergone prior discectomies at L4-5 on the right.  Ultimately on 17 she underwent lumbar decompression and fusion to address the third time recurrent disc herniation and segmental instability.  At surgery she suffered a dural rent for which she was kept at bed rest for a short period of time.  Subsequently she was seen with markedly elevated blood glucoses.  She was admitted to the hospital in diabetic ketoacidosis.  After discharge she has been in rehabilitation for the last 3 weeks or so.  She's been undergoing therapy and her strength is better.  She is now able to keep food down and has put on a little bit of weight.  Today she did see her endocrinologist.  Her sugars remain volatile.  She has some low-grade back and leg pain.    Review of Systems   Constitutional: Negative for activity change, appetite change, chills, diaphoresis, fatigue, fever and unexpected weight change.   HENT: Negative for congestion, dental problem, drooling, ear discharge, ear pain, facial swelling, hearing loss, mouth sores, nosebleeds, postnasal drip, rhinorrhea, sinus pressure, sneezing, sore throat, tinnitus, trouble swallowing and voice change.    Eyes: Negative for photophobia, pain, discharge, redness, itching and visual disturbance.   Respiratory: Negative for apnea, cough, choking, chest tightness, shortness of breath, wheezing and stridor.    Cardiovascular: Negative for chest pain, palpitations and leg swelling.   Gastrointestinal: Negative for abdominal distention, abdominal pain, anal bleeding, blood in stool, constipation, diarrhea, nausea, rectal pain and vomiting.   Endocrine: Negative for cold intolerance, heat intolerance, polydipsia,  "polyphagia and polyuria.   Genitourinary: Negative for decreased urine volume, difficulty urinating, dysuria, enuresis, flank pain, frequency, genital sores, hematuria and urgency.   Musculoskeletal: Positive for back pain and gait problem. Negative for arthralgias, joint swelling, myalgias, neck pain and neck stiffness.   Skin: Negative for color change, pallor, rash and wound.   Allergic/Immunologic: Negative for environmental allergies, food allergies and immunocompromised state.   Neurological: Positive for weakness. Negative for dizziness, tremors, seizures, syncope, facial asymmetry, speech difficulty, light-headedness, numbness and headaches.   Hematological: Negative for adenopathy. Does not bruise/bleed easily.   Psychiatric/Behavioral: Negative for agitation, behavioral problems, confusion, decreased concentration, dysphoric mood, hallucinations, self-injury, sleep disturbance and suicidal ideas. The patient is not nervous/anxious and is not hyperactive.    All other systems reviewed and are negative.      The patient's past medical history, past surgical history, family history, and social history have been reviewed at length in the electronic medical record.    Physical Exam:   Temp 96.4 °F (35.8 °C) (Temporal Artery )   Ht 62\" (157.5 cm)  Wt 141 lb (64 kg)  BMI 25.79 kg/m2  The patient appears much more comfortable.  She is appropriate and oriented.  She moves about in a fairly spry fashion.  Her incision is well-healed.    Medical Decision Making    Diagnosis:   Third time lumbar disc herniation at L4-5 with segmental instability status post decompression and fusion.    Treatment Options:   Ms. Johnson is doing much better although her glucose control remains an issue.  She'll follow-up in our clinic in a couple of months with new plain films of the lumbar spine.       Diagnosis Plan   1. Status post lumbar spinal fusion  XR Spine Lumbar AP & Lateral   2. Recurrent herniation of lumbar disc     3. " Lumbar spine instability     4. Lumbar stenosis with neurogenic claudication         Scribed for Vick Dhaliwal MD by Faith Garza CMA on 09/12/2017 at 2:26 PM    I, Dr. Dhaliwal, personally performed the services described in the documentation, as scribed in my presence, and it is both accurate and complete.

## 2017-11-15 ENCOUNTER — HOSPITAL ENCOUNTER (OUTPATIENT)
Dept: GENERAL RADIOLOGY | Facility: HOSPITAL | Age: 72
Discharge: HOME | End: 2017-11-15
Attending: NEUROLOGICAL SURGERY | Admitting: NEUROLOGICAL SURGERY

## 2017-11-15 ENCOUNTER — OFFICE VISIT (OUTPATIENT)
Dept: NEUROSURGERY | Facility: CLINIC | Age: 72
End: 2017-11-15

## 2017-11-15 VITALS — BODY MASS INDEX: 27.42 KG/M2 | HEIGHT: 62 IN | WEIGHT: 149 LBS | TEMPERATURE: 98.3 F

## 2017-11-15 DIAGNOSIS — M53.2X6 LUMBAR SPINE INSTABILITY: ICD-10-CM

## 2017-11-15 DIAGNOSIS — Z98.1 STATUS POST LUMBAR SPINAL FUSION: ICD-10-CM

## 2017-11-15 DIAGNOSIS — M51.26 RECURRENT HERNIATION OF LUMBAR DISC: ICD-10-CM

## 2017-11-15 DIAGNOSIS — M48.062 LUMBAR STENOSIS WITH NEUROGENIC CLAUDICATION: ICD-10-CM

## 2017-11-15 DIAGNOSIS — Z98.1 HISTORY OF LUMBAR FUSION: Primary | ICD-10-CM

## 2017-11-15 PROCEDURE — 99212 OFFICE O/P EST SF 10 MIN: CPT | Performed by: NEUROLOGICAL SURGERY

## 2017-11-15 PROCEDURE — 72100 X-RAY EXAM L-S SPINE 2/3 VWS: CPT

## 2017-11-15 RX ORDER — POLYETHYLENE GLYCOL 3350 17 G/17G
17 POWDER, FOR SOLUTION ORAL DAILY PRN
Qty: 250 G | Refills: 1 | Status: SHIPPED | OUTPATIENT
Start: 2017-11-15

## 2017-11-15 NOTE — PROGRESS NOTES
Patient: Paulina Johnson  : 1945    Primary Care Provider: Hernan Thompson MD    Requesting Provider: As above        History    Chief Complaint: Back and leg pain.    History of Present Illness: Ms. Johnson is seen in follow-up.  She is a 71-year-old woman who had undergone prior discectomies at L4-5 on the right.  Ultimately on 17 she underwent lumbar decompression and fusion to address the third time recurrent disc herniation and segmental instability.  At surgery she suffered a dural rent for which she was kept at bed rest for a short period of time.  Subsequently she was seen with markedly elevated blood glucoses.  She was admitted to the hospital in diabetic ketoacidosis.  She was just recently discharged from rehabilitation.  Her sugars have been better since she's been home.  She has been walking fairly actively.  She has only modest pain.  Her appetite has been good.    Review of Systems   Constitutional: Negative for activity change, appetite change, chills, diaphoresis, fatigue, fever and unexpected weight change.   HENT: Negative for congestion, dental problem, drooling, ear discharge, ear pain, facial swelling, hearing loss, mouth sores, nosebleeds, postnasal drip, rhinorrhea, sinus pressure, sneezing, sore throat, tinnitus, trouble swallowing and voice change.    Eyes: Negative for photophobia, pain, discharge, redness, itching and visual disturbance.   Respiratory: Negative for apnea, cough, choking, chest tightness, shortness of breath, wheezing and stridor.    Cardiovascular: Negative for chest pain, palpitations and leg swelling.   Gastrointestinal: Negative for abdominal distention, abdominal pain, anal bleeding, blood in stool, constipation, diarrhea, nausea, rectal pain and vomiting.   Endocrine: Negative for cold intolerance, heat intolerance, polydipsia, polyphagia and polyuria.   Genitourinary: Negative for decreased urine volume, difficulty urinating, dysuria,  "enuresis, flank pain, frequency, genital sores, hematuria and urgency.   Musculoskeletal: Positive for back pain and gait problem. Negative for arthralgias, joint swelling, myalgias, neck pain and neck stiffness.   Skin: Negative for color change, pallor, rash and wound.   Allergic/Immunologic: Negative for environmental allergies, food allergies and immunocompromised state.   Neurological: Positive for weakness. Negative for dizziness, tremors, seizures, syncope, facial asymmetry, speech difficulty, light-headedness, numbness and headaches.   Hematological: Negative for adenopathy. Does not bruise/bleed easily.   Psychiatric/Behavioral: Negative for agitation, behavioral problems, confusion, decreased concentration, dysphoric mood, hallucinations, self-injury, sleep disturbance and suicidal ideas. The patient is not nervous/anxious and is not hyperactive.    All other systems reviewed and are negative.      The patient's past medical history, past surgical history, family history, and social history have been reviewed at length in the electronic medical record.    Physical Exam:   Temp 98.3 °F (36.8 °C)  Ht 62\" (157.5 cm)  Wt 149 lb (67.6 kg)  BMI 27.25 kg/m2  MUSCULOSKELETAL:  Straight leg raising is negative.  Edgar's Sign is negative.  ROM in back is mildly limited in all directions.  Tenderness in the back to palpation is not observed.  Well-healed midline lumbosacral incision is noted.  NEUROLOGICAL:  Strength is intact in the lower extremities to direct testing.  Muscle tone is normal throughout.  Station and gait are normal.  Sensation is intact to light touch testing throughout.      Medical Decision Making    Data Review:   X-rays of the lumbar spine demonstrate her construct to be in good position at L4-5.    Diagnosis:   L4-5 segmental instability with third time disc herniation status post decompression and fusion.    Treatment Options:   Ms. Johnson is doing quite well.  She is headed to [place] " for the inclement months.  She will follow-up in our clinic in the spring with new plain films of the lumbar spine.       Diagnosis Plan   1. History of lumbar fusion  XR Spine Lumbar AP & Lateral   2. Recurrent herniation of lumbar disc     3. Lumbar spine instability     4. Lumbar stenosis with neurogenic claudication       Scribed for Vick Dhaliwal MD by Faith Garza CMA on 11/15/2017 at 11:32 AM    I, Dr. Dhaliwal, personally performed the services described in the documentation, as scribed in my presence, and it is both accurate and complete.

## 2018-04-17 ENCOUNTER — HOSPITAL ENCOUNTER (OUTPATIENT)
Dept: GENERAL RADIOLOGY | Facility: HOSPITAL | Age: 73
Discharge: HOME | End: 2018-04-17
Attending: NEUROLOGICAL SURGERY | Admitting: NEUROLOGICAL SURGERY

## 2018-04-17 ENCOUNTER — OFFICE VISIT (OUTPATIENT)
Dept: NEUROSURGERY | Facility: CLINIC | Age: 73
End: 2018-04-17

## 2018-04-17 VITALS
RESPIRATION RATE: 17 BRPM | DIASTOLIC BLOOD PRESSURE: 70 MMHG | HEIGHT: 62 IN | SYSTOLIC BLOOD PRESSURE: 119 MMHG | BODY MASS INDEX: 29.26 KG/M2 | OXYGEN SATURATION: 98 % | WEIGHT: 159 LBS | TEMPERATURE: 96.3 F

## 2018-04-17 DIAGNOSIS — Z98.1 HISTORY OF LUMBAR FUSION: Primary | ICD-10-CM

## 2018-04-17 DIAGNOSIS — Z98.1 HISTORY OF LUMBAR FUSION: ICD-10-CM

## 2018-04-17 DIAGNOSIS — M48.062 LUMBAR STENOSIS WITH NEUROGENIC CLAUDICATION: ICD-10-CM

## 2018-04-17 DIAGNOSIS — M51.26 RECURRENT HERNIATION OF LUMBAR DISC: ICD-10-CM

## 2018-04-17 DIAGNOSIS — M53.2X6 LUMBAR SPINE INSTABILITY: ICD-10-CM

## 2018-04-17 PROCEDURE — 72100 X-RAY EXAM L-S SPINE 2/3 VWS: CPT

## 2018-04-17 PROCEDURE — 99213 OFFICE O/P EST LOW 20 MIN: CPT | Performed by: PHYSICIAN ASSISTANT
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
## 2018-11-27 ENCOUNTER — HOSPITAL ENCOUNTER (OUTPATIENT)
Dept: GENERAL RADIOLOGY | Facility: HOSPITAL | Age: 73
Discharge: HOME | End: 2018-11-27
Admitting: PHYSICIAN ASSISTANT

## 2018-11-27 PROCEDURE — 72100 X-RAY EXAM L-S SPINE 2/3 VWS: CPT

## 2018-12-04 ENCOUNTER — OFFICE VISIT (OUTPATIENT)
Dept: NEUROSURGERY | Facility: CLINIC | Age: 73
End: 2018-12-04

## 2018-12-04 VITALS — WEIGHT: 164 LBS | TEMPERATURE: 97.7 F | HEIGHT: 62 IN | BODY MASS INDEX: 30.18 KG/M2

## 2018-12-04 DIAGNOSIS — Z98.1 HISTORY OF LUMBAR FUSION: Primary | ICD-10-CM

## 2018-12-04 DIAGNOSIS — M53.2X6 LUMBAR SPINE INSTABILITY: ICD-10-CM

## 2018-12-04 PROCEDURE — 99212 OFFICE O/P EST SF 10 MIN: CPT | Performed by: NEUROLOGICAL SURGERY

## 2018-12-04 NOTE — PROGRESS NOTES
Patient: Paulina Johnson  : 1945    Primary Care Provider: Hernan Thompson MD    Requesting Provider: As above        History    Chief Complaint: Back and leg pain.    History of Present Illness: Ms. Johnson is seen in follow-up.  She is a 73-year-old woman who had undergone prior discectomies at L4-5 on the right.  Ultimately on 17 she underwent lumbar decompression and fusion to address the third time recurrent disc herniation and segmental instability.  At surgery she suffered a dural rent for which she was kept at bed rest for a short period of time.  Subsequently she was seen with markedly elevated blood glucoses.  She continues to complain of back pain.  She has generalized pain involving her extremities which has been diagnosed as a neuropathy.  Lyrica caused her to put on 40 pounds.  Neurontin caused a rash.  She is intolerant of all such medicines.    Review of Systems   Constitutional: Negative for activity change, appetite change, chills, diaphoresis, fatigue, fever and unexpected weight change.   HENT: Negative for congestion, dental problem, drooling, ear discharge, ear pain, facial swelling, hearing loss, mouth sores, nosebleeds, postnasal drip, rhinorrhea, sinus pressure, sneezing, sore throat, tinnitus, trouble swallowing and voice change.    Eyes: Negative for photophobia, pain, discharge, redness, itching and visual disturbance.   Respiratory: Negative for apnea, cough, choking, chest tightness, shortness of breath, wheezing and stridor.    Cardiovascular: Negative for chest pain, palpitations and leg swelling.   Gastrointestinal: Negative for abdominal distention, abdominal pain, anal bleeding, blood in stool, constipation, diarrhea, nausea, rectal pain and vomiting.   Endocrine: Negative for cold intolerance, heat intolerance, polydipsia, polyphagia and polyuria.   Genitourinary: Negative for decreased urine volume, difficulty urinating, dysuria, enuresis, flank pain,  "frequency, genital sores, hematuria and urgency.   Musculoskeletal: Negative for arthralgias, back pain, gait problem, joint swelling, myalgias, neck pain and neck stiffness.   Skin: Negative for color change, pallor, rash and wound.   Allergic/Immunologic: Negative for environmental allergies, food allergies and immunocompromised state.   Neurological: Negative for dizziness, tremors, seizures, syncope, facial asymmetry, speech difficulty, weakness, light-headedness, numbness and headaches.   Hematological: Negative for adenopathy. Does not bruise/bleed easily.   Psychiatric/Behavioral: Negative for agitation, behavioral problems, confusion, decreased concentration, dysphoric mood, hallucinations, self-injury, sleep disturbance and suicidal ideas. The patient is not nervous/anxious and is not hyperactive.        The patient's past medical history, past surgical history, family history, and social history have been reviewed at length in the electronic medical record.    Physical Exam:   Temp 97.7 °F (36.5 °C) (Temporal)   Ht 157.5 cm (62\")   Wt 74.4 kg (164 lb)   BMI 30.00 kg/m²   The patient has put on a fair amount of weight.  She moves about in a somewhat stooped fashion.  Her incision looks good.  Straight leg raising is negative.    Medical Decision Making    Data Review:   Plain films show good positioning of her construct at L4-5 where there is modest fusion mass that is formed.    Diagnosis:   L4-5 segmental instability with third time disc herniation status post decompression and fusion.    Treatment Options:   At this juncture Ms. Johnson will follow-up on an as-needed basis.       Diagnosis Plan   1. History of lumbar fusion     2. Lumbar spine instability         Scribed for Vick Dhaliwal MD by Nasreen Presley CMA on 12/04/2018 at 2:37 PM      I, Dr. Dhaliwal, personally performed the services described in the documentation, as scribed in my presence, and it is both accurate and complete.  "

## 2020-10-15 ENCOUNTER — TELEPHONE (OUTPATIENT)
Dept: ENDOCRINOLOGY | Facility: CLINIC | Age: 75
End: 2020-10-15

## 2020-11-10 ENCOUNTER — OFFICE VISIT (OUTPATIENT)
Dept: ENDOCRINOLOGY | Facility: CLINIC | Age: 75
End: 2020-11-10

## 2020-11-10 VITALS
WEIGHT: 149 LBS | OXYGEN SATURATION: 97 % | BODY MASS INDEX: 26.4 KG/M2 | HEART RATE: 88 BPM | TEMPERATURE: 96.8 F | DIASTOLIC BLOOD PRESSURE: 92 MMHG | HEIGHT: 63 IN | SYSTOLIC BLOOD PRESSURE: 162 MMHG

## 2020-11-10 DIAGNOSIS — E11.649 TYPE 2 DIABETES MELLITUS WITH HYPOGLYCEMIA WITHOUT COMA, WITH LONG-TERM CURRENT USE OF INSULIN (HCC): ICD-10-CM

## 2020-11-10 DIAGNOSIS — Z79.4 TYPE 2 DIABETES MELLITUS WITH HYPOGLYCEMIA WITHOUT COMA, WITH LONG-TERM CURRENT USE OF INSULIN (HCC): ICD-10-CM

## 2020-11-10 DIAGNOSIS — E11.65 UNCONTROLLED TYPE 2 DIABETES MELLITUS WITH HYPERGLYCEMIA (HCC): Primary | ICD-10-CM

## 2020-11-10 DIAGNOSIS — E11.49 DM (DIABETES MELLITUS), TYPE 2 WITH NEUROLOGICAL COMPLICATIONS (HCC): ICD-10-CM

## 2020-11-10 DIAGNOSIS — E03.9 ACQUIRED HYPOTHYROIDISM: ICD-10-CM

## 2020-11-10 DIAGNOSIS — I10 ESSENTIAL HYPERTENSION: ICD-10-CM

## 2020-11-10 DIAGNOSIS — Z79.4 LONG-TERM INSULIN USE (HCC): ICD-10-CM

## 2020-11-10 PROCEDURE — 99214 OFFICE O/P EST MOD 30 MIN: CPT | Performed by: INTERNAL MEDICINE

## 2020-11-10 RX ORDER — LOSARTAN POTASSIUM 100 MG/1
100 TABLET ORAL DAILY
Qty: 90 TABLET | Refills: 3 | Status: SHIPPED | OUTPATIENT
Start: 2020-11-10 | End: 2022-05-09 | Stop reason: SDUPTHER

## 2020-11-10 RX ORDER — DULOXETIN HYDROCHLORIDE 60 MG/1
60 CAPSULE, DELAYED RELEASE ORAL DAILY
COMMUNITY
Start: 2020-08-14 | End: 2021-02-23 | Stop reason: ALTCHOICE

## 2020-11-10 RX ORDER — LOSARTAN POTASSIUM 50 MG/1
100 TABLET ORAL DAILY
COMMUNITY
Start: 2020-08-14 | End: 2020-11-10 | Stop reason: DRUGHIGH

## 2020-11-10 NOTE — PROGRESS NOTES
"     Office Note      Date: 11/10/2020  Patient Name: Paulina Johnson  MRN: [id number]  : 1945    Chief Complaint   Patient presents with   • Diabetes       History of Present Illness:   Paulina Johnson is a 75 y.o. female who presents for Diabetes type 2. Diagnosed in: . Treated in past with oral agents. Current treatments: basal bolus insulin. Number of insulin shots per day: >4. Checks blood sugar 4 times a day. Has low blood sugar: frequent. Aspirin use: Yes. Statin use: Yes. ACE-I/ARB use: Yes. Changes in health since last visit: none. Last eye exam .    She remains on T4 but she isn't sure what dose she is taking - maybe 88 or 100mcg? She is taking this correctly. She isn't taking any interfering meds concurrently. She hasn't noted any change in the size of her neck. She denies any compressive sxs. She denies any sxs of hypo- or hyperthyroidism at this time.    She had labs done last week.  The A1c was 7.2%.  The TSH was 3.88.  Microalbumin was elevated.  She says the duloxetine and tramadol are helping her feet.    Subjective      Diabetic Complications:  Eyes: No  Kidneys: Yes - albuminuria  Feet: Yes - neuropathy pain  Heart: No    Diet and Exercise:  Meals per day: 3  Minutes of exercise per week: 0 mins.    Review of Systems:   Review of Systems   Constitutional: Negative.    Cardiovascular: Negative.    Gastrointestinal: Negative.    Endocrine: Negative.        The following portions of the patient's history were reviewed and updated as appropriate: allergies, current medications, past family history, past medical history, past social history, past surgical history and problem list.    Objective       Visit Vitals  /92 (BP Location: Left arm, Patient Position: Sitting, Cuff Size: Adult)   Pulse 88   Temp 96.8 °F (36 °C) (Infrared)   Ht 158.8 cm (62.5\")   Wt 67.6 kg (149 lb)   SpO2 97%   BMI 26.82 kg/m²       Physical Exam:  Physical Exam  Constitutional:       Appearance: " Normal appearance.   Neurological:      Mental Status: She is alert.         Labs:    HbA1c  Lab Results   Component Value Date    HGBA1C 11.00 (H) 08/15/2017       CMP  Lab Results   Component Value Date    GLUCOSE 85 08/22/2017    BUN 26 (H) 08/22/2017    CREATININE 1.00 08/22/2017    EGFRIFNONA 55 (L) 08/22/2017    BCR 26.0 (H) 08/22/2017    K 4.1 08/22/2017    CO2 25.0 08/22/2017    CALCIUM 9.0 08/22/2017    AST 17 08/16/2017    ALT 14 08/16/2017        Lipid Panel  Lab Results   Component Value Date    HDL 34 (L) 08/16/2017    LDL 58 08/16/2017    TRIG 279 (H) 08/16/2017        TSH  Lab Results   Component Value Date    TSH 9.098 (H) 08/16/2017    FREET4 1.03 08/22/2017        Hemoglobin A1C  Lab Results   Component Value Date    HGBA1C 11.00 (H) 08/15/2017        Microalbumin/Creatinine  No results found for: MALBCRERATIO, CREATINIURIN, MICROALBUR        Assessment / Plan      Assessment & Plan:  Problem List Items Addressed This Visit        Cardiovascular and Mediastinum    HTN (hypertension)    Current Assessment & Plan     Hypertension is unchanged.  Dietary sodium restriction.  Blood pressure will be reassessed in 3 months.    BP elevated.  Increase losartan.         Relevant Medications    furosemide (LASIX) 40 MG tablet    losartan (COZAAR) 100 MG tablet       Endocrine    Hypothyroid    Current Assessment & Plan     Recent TSH okay.  She still isn't sure what dose of T4 she is taking.  She will check the bottle when she gets home and let us know.         Relevant Medications    levothyroxine (SYNTHROID, LEVOTHROID) 50 MCG tablet    Uncontrolled type 2 diabetes mellitus with hyperglycemia (CMS/[facility]) - Primary    Current Assessment & Plan     Diabetes is improving with treatment.   Continue current treatment regimen.  Diabetes will be reassessed in 3 months.    A1c better.         Relevant Medications    insulin aspart (novoLOG) 100 UNIT/ML injection    insulin detemir (LEVEMIR) 100 UNIT/ML injection     Type 2 diabetes mellitus with hypoglycemia, with long-term current use of insulin (CMS/[facility])    Current Assessment & Plan     Some fasting lows.  Will decrease PM levemir.         Relevant Medications    insulin aspart (novoLOG) 100 UNIT/ML injection    insulin detemir (LEVEMIR) 100 UNIT/ML injection    DM (diabetes mellitus), type 2 with neurological complications (CMS/[facility])    Current Assessment & Plan     Continue tramadol and duloxetine.         Relevant Medications    insulin aspart (novoLOG) 100 UNIT/ML injection    insulin detemir (LEVEMIR) 100 UNIT/ML injection       Other    Long-term insulin use (CMS/[facility])           Return in about 3 months (around 2/10/2021) for Recheck with A1c, TSH.    Anmol Robertson MD   11/10/2020

## 2020-11-10 NOTE — ASSESSMENT & PLAN NOTE
Diabetes is improving with treatment.   Continue current treatment regimen.  Diabetes will be reassessed in 3 months.    A1c better.

## 2020-11-10 NOTE — ASSESSMENT & PLAN NOTE
Recent TSH okay.  She still isn't sure what dose of T4 she is taking.  She will check the bottle when she gets home and let us know.

## 2020-11-10 NOTE — ASSESSMENT & PLAN NOTE
Hypertension is unchanged.  Dietary sodium restriction.  Blood pressure will be reassessed in 3 months.    BP elevated.  Increase losartan.

## 2020-11-11 ENCOUNTER — TELEPHONE (OUTPATIENT)
Dept: ENDOCRINOLOGY | Facility: CLINIC | Age: 75
End: 2020-11-11

## 2020-11-11 RX ORDER — LEVOTHYROXINE SODIUM 0.1 MG/1
100 TABLET ORAL DAILY
Qty: 90 TABLET | Refills: 3 | Status: SHIPPED | OUTPATIENT
Start: 2020-11-11 | End: 2021-06-16 | Stop reason: DRUGHIGH

## 2020-11-11 NOTE — TELEPHONE ENCOUNTER
PATIENT WAS SEEN YESTERDAY AND WAS TOLD TO CALL BACK ABOUT MILLIGRAM OF A MEDICATION SHE IS TAKING. THE MEDICATION IS THYROID AND DOSAGE  MILLIGRAM. SHE STATES DR. HARLEY WAS WANTING TO CHANGE THE MEDICATION DOSAGE BUT NEEDED TO KNOW WHAT SHE IS CURRENTLY TAKING. PATIENTS NUMBER [phone]

## 2020-12-24 ENCOUNTER — TELEPHONE (OUTPATIENT)
Dept: ENDOCRINOLOGY | Facility: CLINIC | Age: 75
End: 2020-12-24

## 2020-12-24 NOTE — TELEPHONE ENCOUNTER
Received BS log and gave to [facility] for review. Per the paper there are no changes in doses. Attempted to contact patient was unable to leave message.

## 2021-01-04 RX ORDER — INSULIN ASPART 100 [IU]/ML
INJECTION, SOLUTION INTRAVENOUS; SUBCUTANEOUS
Qty: 45 ML | Refills: 1 | Status: SHIPPED | OUTPATIENT
Start: 2021-01-04 | End: 2021-06-16

## 2021-01-26 ENCOUNTER — TELEPHONE (OUTPATIENT)
Dept: ENDOCRINOLOGY | Facility: CLINIC | Age: 76
End: 2021-01-26

## 2021-02-23 ENCOUNTER — OFFICE VISIT (OUTPATIENT)
Dept: ENDOCRINOLOGY | Facility: CLINIC | Age: 76
End: 2021-02-23

## 2021-02-23 ENCOUNTER — LAB (OUTPATIENT)
Dept: LAB | Facility: HOSPITAL | Age: 76
End: 2021-02-23

## 2021-02-23 VITALS
WEIGHT: 150.8 LBS | DIASTOLIC BLOOD PRESSURE: 80 MMHG | TEMPERATURE: 97.3 F | SYSTOLIC BLOOD PRESSURE: 130 MMHG | BODY MASS INDEX: 26.72 KG/M2 | HEIGHT: 63 IN

## 2021-02-23 DIAGNOSIS — E03.9 ACQUIRED HYPOTHYROIDISM: ICD-10-CM

## 2021-02-23 DIAGNOSIS — I10 ESSENTIAL HYPERTENSION: ICD-10-CM

## 2021-02-23 DIAGNOSIS — Z79.4 LONG-TERM INSULIN USE (HCC): ICD-10-CM

## 2021-02-23 DIAGNOSIS — Z79.4 TYPE 2 DIABETES MELLITUS WITH HYPOGLYCEMIA WITHOUT COMA, WITH LONG-TERM CURRENT USE OF INSULIN (HCC): ICD-10-CM

## 2021-02-23 DIAGNOSIS — E78.5 HYPERLIPIDEMIA, UNSPECIFIED HYPERLIPIDEMIA TYPE: ICD-10-CM

## 2021-02-23 DIAGNOSIS — E11.65 UNCONTROLLED TYPE 2 DIABETES MELLITUS WITH HYPERGLYCEMIA (HCC): Primary | ICD-10-CM

## 2021-02-23 DIAGNOSIS — E11.649 TYPE 2 DIABETES MELLITUS WITH HYPOGLYCEMIA WITHOUT COMA, WITH LONG-TERM CURRENT USE OF INSULIN (HCC): ICD-10-CM

## 2021-02-23 LAB
EXPIRATION DATE: NORMAL
HBA1C MFR BLD: 7.5 %
Lab: NORMAL

## 2021-02-23 PROCEDURE — 99214 OFFICE O/P EST MOD 30 MIN: CPT | Performed by: INTERNAL MEDICINE

## 2021-02-23 PROCEDURE — 84443 ASSAY THYROID STIM HORMONE: CPT

## 2021-02-23 PROCEDURE — 83036 HEMOGLOBIN GLYCOSYLATED A1C: CPT | Performed by: INTERNAL MEDICINE

## 2021-02-23 RX ORDER — TRAMADOL HYDROCHLORIDE 50 MG/1
1 TABLET ORAL 2 TIMES DAILY
COMMUNITY
Start: 2021-02-15 | End: 2021-11-02 | Stop reason: SDUPTHER

## 2021-02-23 NOTE — ASSESSMENT & PLAN NOTE
Diabetes is unchanged.   Continue current treatment regimen.  Diabetes will be reassessed in 3 months.    Decrease levemir due to nocturnal lows.

## 2021-02-23 NOTE — PROGRESS NOTES
"     Office Note      Date: 2021  Patient Name: Paulina Johnson  MRN: [id number]  : 1945    Chief Complaint   Patient presents with   • Follow-up   • Diabetes       History of Present Illness:     Paulina Johnson is a 75 y.o. female who presents for Diabetes type 2. Diagnosed in: . Treated in past with oral agents. Current treatments: basal bolus insulin. Number of insulin shots per day: >4. Checks blood sugar 4 times a day. Has low blood sugar: frequent - usually nocturnal - one severe since last visit. She has DexCom but hasn't been trained on how to use it.  Aspirin use: Yes. Statin use: Yes. ACE-I/ARB use: Yes. Changes in health since last visit: none. Last eye exam .  She reports the duloxetine and tramadol are helping her feet.     She remains on T4 100mcg QD She is taking this correctly. She isn't taking any interfering meds concurrently. She hasn't noted any change in the size of her neck. She denies any compressive sxs. She denies any sxs of hypo- or hyperthyroidism at this time.    Subjective      Diabetic Complications:  Eyes: No  Kidneys: Yes - albuminuria  Feet: Yes - neuropathy pain  Heart: No    Diet and Exercise:  Meals per day: 3  Minutes of exercise per week: 0 mins.    Review of Systems:   Review of Systems   Constitutional: Negative.    Cardiovascular: Negative.    Gastrointestinal: Negative.    Endocrine: Negative.        The following portions of the patient's history were reviewed and updated as appropriate: allergies, current medications, past family history, past medical history, past social history, past surgical history and problem list.    Objective       Visit Vitals  /80   Temp 97.3 °F (36.3 °C) (Infrared)   Ht 158.8 cm (62.5\")   Wt 68.4 kg (150 lb 12.8 oz)   BMI 27.14 kg/m²       Physical Exam:  Physical Exam  Constitutional:       Appearance: Normal appearance.   Neurological:      Mental Status: She is alert.         Labs:    HbA1c  Lab Results "   Component Value Date    HGBA1C 7.5 02/23/2021       CMP  Lab Results   Component Value Date    GLUCOSE 85 08/22/2017    BUN 26 (H) 08/22/2017    CREATININE 1.00 08/22/2017    EGFRIFNONA 55 (L) 08/22/2017    BCR 26.0 (H) 08/22/2017    K 4.1 08/22/2017    CO2 25.0 08/22/2017    CALCIUM 9.0 08/22/2017    AST 17 08/16/2017    ALT 14 08/16/2017        Lipid Panel  Lab Results   Component Value Date    HDL 34 (L) 08/16/2017    LDL 58 08/16/2017    TRIG 279 (H) 08/16/2017        TSH  Lab Results   Component Value Date    TSH 9.098 (H) 08/16/2017    FREET4 1.03 08/22/2017        Hemoglobin A1C  Lab Results   Component Value Date    HGBA1C 7.5 02/23/2021        Microalbumin/Creatinine  No results found for: MALBCRERATIO, CREATINIURIN, MICROALBUR        Assessment / Plan      Assessment & Plan:  Diagnoses and all orders for this visit:    1. Uncontrolled type 2 diabetes mellitus with hyperglycemia (CMS/[facility]) (Primary)  Assessment & Plan:  Diabetes is unchanged.   Continue current treatment regimen.  Diabetes will be reassessed in 3 months.    Decrease levemir due to nocturnal lows.    Orders:  -     POC Glycosylated Hemoglobin (Hb A1C)    2. Type 2 diabetes mellitus with hypoglycemia without coma, with long-term current use of insulin (CMS/[facility])  Assessment & Plan:  Arrange for DexCom training.        3. Essential hypertension  Assessment & Plan:  Hypertension is unchanged.  Continue current treatment regimen.  Blood pressure will be reassessed at the next regular appointment.      4. Hyperlipidemia, unspecified hyperlipidemia type  Assessment & Plan:  Continue statin.      5. Acquired hypothyroidism  Assessment & Plan:  Continue T4.  Check TSH.    Orders:  -     TSH; Future    6. Long-term insulin use (CMS/[facility])    Other orders  -     insulin detemir (LEVEMIR) 100 UNIT/ML injection; Inject 12 Units under the skin into the appropriate area as directed 2 (Two) Times a Day for 30 days. Inject 14u sq q AM and 14u sq q PM   Dispense: 15 mL; Refill: 5      Return in about 3 months (around 5/23/2021) for Recheck with A1c.    Anmol Robertson MD   02/23/2021

## 2021-02-24 LAB — TSH SERPL DL<=0.05 MIU/L-ACNC: 3.67 UIU/ML (ref 0.27–4.2)

## 2021-02-24 NOTE — TELEPHONE ENCOUNTER
[facility] is calling needing direction clarification on Levemir,   12 units two times daily or   14 in the morning and 14 in evening    [phone]

## 2021-02-25 ENCOUNTER — TELEPHONE (OUTPATIENT)
Dept: ENDOCRINOLOGY | Facility: CLINIC | Age: 76
End: 2021-02-25

## 2021-03-01 ENCOUNTER — TELEPHONE (OUTPATIENT)
Dept: ENDOCRINOLOGY | Facility: CLINIC | Age: 76
End: 2021-03-01

## 2021-03-01 NOTE — TELEPHONE ENCOUNTER
Patient called and stated that [facility] told her she needed proof that she needs a life alert necklace so that it can be covered by them and wanted to know if we could help with that. Please advise.

## 2021-03-02 NOTE — TELEPHONE ENCOUNTER
Letter mailed to pt for life alert.  Also spoke to pt per wrm:  He reviewed blood sugars - no changes.  She voiced understanding

## 2021-03-08 NOTE — TELEPHONE ENCOUNTER
Spoke with patient.  She is going to check with her PCP to see if they can assist her.  If not, she is going to call the office back.

## 2021-03-08 NOTE — TELEPHONE ENCOUNTER
Pt called stated that she was setup with the wrong  for the [employer]. I checked with diabetes educator to see if maybe they set this up they did not. Pt need to be setup with [employer]

## 2021-03-18 ENCOUNTER — TELEPHONE (OUTPATIENT)
Dept: ENDOCRINOLOGY | Facility: CLINIC | Age: 76
End: 2021-03-18

## 2021-05-11 RX ORDER — SIMVASTATIN 20 MG
20 TABLET ORAL DAILY
Qty: 90 TABLET | Refills: 1 | Status: SHIPPED | OUTPATIENT
Start: 2021-05-11 | End: 2022-05-09 | Stop reason: SDUPTHER

## 2021-05-11 RX ORDER — INSULIN DETEMIR 100 [IU]/ML
14 INJECTION, SOLUTION SUBCUTANEOUS 2 TIMES DAILY
Qty: 9 ML | Refills: 2 | Status: SHIPPED | OUTPATIENT
Start: 2021-05-11 | End: 2022-05-09 | Stop reason: SDUPTHER

## 2021-05-11 NOTE — TELEPHONE ENCOUNTER
Spoke with patient.  She is taking 14 units twice daily and using the pens.  Pharmacy notified.  Asked for new script to be sent in.  Rx pended.

## 2021-05-11 NOTE — TELEPHONE ENCOUNTER
PHARMACY IS REQUTING A RETURN CALL TO CLARIFY LEVEMIR RX. PHARMACY STATS THAT PATIENT USES PENS INSTEAD OF VIALS. DOSING IS DIFFERENT, AS WELL.    [phone] [place], PHARMACIST

## 2021-05-12 ENCOUNTER — TELEPHONE (OUTPATIENT)
Dept: ENDOCRINOLOGY | Facility: CLINIC | Age: 76
End: 2021-05-12

## 2021-06-10 ENCOUNTER — TELEPHONE (OUTPATIENT)
Dept: ENDOCRINOLOGY | Facility: CLINIC | Age: 76
End: 2021-06-10

## 2021-06-10 RX ORDER — DULOXETIN HYDROCHLORIDE 60 MG/1
60 CAPSULE, DELAYED RELEASE ORAL DAILY
Qty: 90 CAPSULE | Refills: 3 | Status: SHIPPED | OUTPATIENT
Start: 2021-06-10

## 2021-06-10 NOTE — TELEPHONE ENCOUNTER
[facility] CALLED REQUESTING A REFILL OF DULOXETINE FOR THIS PT. PLEASE AND THANK YOU

## 2021-06-10 NOTE — TELEPHONE ENCOUNTER
Please see message I do not see this RX one her med list, but could be missing it. Last Ov 2/23/21 future appt 6/15/21

## 2021-06-15 ENCOUNTER — TELEPHONE (OUTPATIENT)
Dept: ENDOCRINOLOGY | Facility: CLINIC | Age: 76
End: 2021-06-15

## 2021-06-16 ENCOUNTER — OFFICE VISIT (OUTPATIENT)
Dept: ENDOCRINOLOGY | Facility: CLINIC | Age: 76
End: 2021-06-16

## 2021-06-16 VITALS
BODY MASS INDEX: 28.41 KG/M2 | HEART RATE: 95 BPM | HEIGHT: 62 IN | WEIGHT: 154.4 LBS | OXYGEN SATURATION: 97 % | DIASTOLIC BLOOD PRESSURE: 62 MMHG | SYSTOLIC BLOOD PRESSURE: 154 MMHG

## 2021-06-16 DIAGNOSIS — E11.65 UNCONTROLLED TYPE 2 DIABETES MELLITUS WITH HYPERGLYCEMIA (HCC): Primary | ICD-10-CM

## 2021-06-16 DIAGNOSIS — I10 ESSENTIAL HYPERTENSION: ICD-10-CM

## 2021-06-16 DIAGNOSIS — E78.5 HYPERLIPIDEMIA, UNSPECIFIED HYPERLIPIDEMIA TYPE: ICD-10-CM

## 2021-06-16 DIAGNOSIS — Z79.4 TYPE 2 DIABETES MELLITUS WITH HYPOGLYCEMIA WITHOUT COMA, WITH LONG-TERM CURRENT USE OF INSULIN (HCC): ICD-10-CM

## 2021-06-16 DIAGNOSIS — Z79.4 LONG-TERM INSULIN USE (HCC): ICD-10-CM

## 2021-06-16 DIAGNOSIS — E03.9 ACQUIRED HYPOTHYROIDISM: ICD-10-CM

## 2021-06-16 DIAGNOSIS — E11.649 TYPE 2 DIABETES MELLITUS WITH HYPOGLYCEMIA WITHOUT COMA, WITH LONG-TERM CURRENT USE OF INSULIN (HCC): ICD-10-CM

## 2021-06-16 LAB — HBA1C MFR BLD: 8.1 %

## 2021-06-16 PROCEDURE — 99214 OFFICE O/P EST MOD 30 MIN: CPT | Performed by: INTERNAL MEDICINE

## 2021-06-16 RX ORDER — INSULIN ASPART 100 [IU]/ML
INJECTION, SOLUTION INTRAVENOUS; SUBCUTANEOUS
Qty: 45 ML | Refills: 1 | Status: SHIPPED | OUTPATIENT
Start: 2021-06-16 | End: 2022-05-09 | Stop reason: SDUPTHER

## 2021-06-16 RX ORDER — LEVOTHYROXINE SODIUM 112 UG/1
112 TABLET ORAL DAILY
COMMUNITY
Start: 2021-05-27 | End: 2021-06-16 | Stop reason: DRUGHIGH

## 2021-06-16 RX ORDER — QUETIAPINE FUMARATE 50 MG/1
50 TABLET, FILM COATED ORAL
COMMUNITY
Start: 2021-05-11 | End: 2022-05-09 | Stop reason: SDUPTHER

## 2021-06-16 RX ORDER — LEVOTHYROXINE SODIUM 0.12 MG/1
125 TABLET ORAL DAILY
Qty: 90 TABLET | Refills: 3 | Status: SHIPPED | OUTPATIENT
Start: 2021-06-16 | End: 2022-05-09 | Stop reason: SDUPTHER

## 2021-06-16 NOTE — ASSESSMENT & PLAN NOTE
Diabetes is worsening.   Continue current treatment regimen.  She has been sending in FSBS for insulin adjustments.  Bedtime readings are a bit higher.  Increase supper dose of novolog.  Diabetes will be reassessed in 3 months.

## 2021-06-16 NOTE — ASSESSMENT & PLAN NOTE
TSH last visit was okay.  However she had labs done recently and TSH was 5.  Will increase T4 dose again.

## 2021-06-16 NOTE — ASSESSMENT & PLAN NOTE
Hypertension is worsening.  Continue current treatment regimen.  Monitor BP at home.  Blood pressure will be reassessed at the next regular appointment.

## 2021-06-16 NOTE — PROGRESS NOTES
"     Office Note      Date: 2021  Patient Name: Paulina Johnson  MRN: [id number]  : 1945    Chief Complaint   Patient presents with   • Diabetes   • Hypothyroidism       History of Present Illness:   Paulina Johnson is a 75 y.o. female who presents for Diabetes type 2. Diagnosed in: . Treated in past with oral agents. Current treatments: basal bolus insulin. Number of insulin shots per day: >4. Checks blood sugar 4 times a day. Has low blood sugar: frequent - usually nocturnal - one severe since last visit. She has DexCom but hasn't been trained on how to use it.  Aspirin use: Yes. Statin use: Yes. ACE-I/ARB use: Yes. Changes in health since last visit: none. Last eye exam .  She reports the duloxetine and tramadol are helping her feet.     She remains on T4 112mcg QD She is taking this correctly. She isn't taking any interfering meds concurrently. She hasn't noted any change in the size of her neck. She denies any compressive sxs. She denies any sxs of hypo- or hyperthyroidism at this time.    Subjective      Diabetic Complications:  Eyes: No  Kidneys: Yes - albuminuria  Feet: Yes - neuropathy pain  Heart: No    Diet and Exercise:  Meals per day: 3  Minutes of exercise per week: 0 mins.    Review of Systems:   Review of Systems   Constitutional: Negative.    Cardiovascular: Negative.    Gastrointestinal: Negative.    Endocrine: Negative.        The following portions of the patient's history were reviewed and updated as appropriate: allergies, current medications, past family history, past medical history, past social history, past surgical history and problem list.    Objective       Visit Vitals  /62   Pulse 95   Ht 157.5 cm (62\")   Wt 70 kg (154 lb 6.4 oz)   SpO2 97%   BMI 28.24 kg/m²       Physical Exam:  Physical Exam  Constitutional:       Appearance: Normal appearance.   Cardiovascular:      Pulses:           Dorsalis pedis pulses are 2+ on the right side and 2+ on the left " side.        Posterior tibial pulses are 2+ on the right side and 2+ on the left side.   Feet:      Right foot:      Protective Sensation: 5 sites tested. 5 sites sensed.      Skin integrity: Skin integrity normal.      Toenail Condition: Right toenails are normal.      Left foot:      Protective Sensation: 5 sites tested. 5 sites sensed.      Skin integrity: Skin integrity normal.      Toenail Condition: Left toenails are normal.   Neurological:      Mental Status: She is alert.         Labs:    HbA1c  Lab Results   Component Value Date    HGBA1C 8.1 05/27/2021       CMP  Lab Results   Component Value Date    GLUCOSE 85 08/22/2017    BUN 26 (H) 08/22/2017    CREATININE 1.00 08/22/2017    EGFRIFNONA 55 (L) 08/22/2017    BCR 26.0 (H) 08/22/2017    K 4.1 08/22/2017    CO2 25.0 08/22/2017    CALCIUM 9.0 08/22/2017    AST 17 08/16/2017    ALT 14 08/16/2017        Lipid Panel  Lab Results   Component Value Date    HDL 34 (L) 08/16/2017    LDL 58 08/16/2017    TRIG 279 (H) 08/16/2017        TSH  Lab Results   Component Value Date    TSH 3.670 02/23/2021    FREET4 1.03 08/22/2017        Hemoglobin A1C  Lab Results   Component Value Date    HGBA1C 8.1 05/27/2021        Microalbumin/Creatinine  No results found for: MALBCRERATIO, CREATINIURIN, MICROALBUR        Assessment / Plan      Assessment & Plan:  Diagnoses and all orders for this visit:    1. Uncontrolled type 2 diabetes mellitus with hyperglycemia (CMS/[facility]) (Primary)  Assessment & Plan:  Diabetes is worsening.   Continue current treatment regimen.  She has been sending in FSBS for insulin adjustments.  Bedtime readings are a bit higher.  Increase supper dose of novolog.  Diabetes will be reassessed in 3 months.      2. Type 2 diabetes mellitus with hypoglycemia without coma, with long-term current use of insulin (CMS/[facility])  Assessment & Plan:  Continue frequent FSBS.      3. Essential hypertension  Assessment & Plan:  Hypertension is worsening.  Continue current  treatment regimen.  Monitor BP at home.  Blood pressure will be reassessed at the next regular appointment.      4. Hyperlipidemia, unspecified hyperlipidemia type  Assessment & Plan:  Continue statin.  Recent lipids okay.      5. Acquired hypothyroidism  Assessment & Plan:  TSH last visit was okay.  However she had labs done recently and TSH was 5.  Will increase T4 dose again.      6. Long-term insulin use (CMS/[facility])    Other orders  -     levothyroxine (SYNTHROID, LEVOTHROID) 125 MCG tablet; Take 1 tablet by mouth Daily.  Dispense: 90 tablet; Refill: 3  -     insulin aspart (NovoLOG FlexPen) 100 UNIT/ML solution pen-injector sc pen; INJECT 10 UNITS WITH B'FAST AND LUNCH AND 12 UNITS WITH SUPPER  Dispense: 45 mL; Refill: 1      Return in about 3 months (around 9/16/2021) for Recheck with A1c and TSH.    Anmol Robertson MD   06/16/2021

## 2021-07-30 ENCOUNTER — TELEPHONE (OUTPATIENT)
Dept: ENDOCRINOLOGY | Facility: CLINIC | Age: 76
End: 2021-07-30

## 2021-08-30 ENCOUNTER — TELEPHONE (OUTPATIENT)
Dept: ENDOCRINOLOGY | Facility: CLINIC | Age: 76
End: 2021-08-30

## 2021-08-30 NOTE — TELEPHONE ENCOUNTER
Spoke with patient to let her know her blood sugars were okay and no changes needed to be made. She verbally understood.

## 2021-11-02 ENCOUNTER — OFFICE VISIT (OUTPATIENT)
Dept: ENDOCRINOLOGY | Facility: CLINIC | Age: 76
End: 2021-11-02

## 2021-11-02 ENCOUNTER — LAB (OUTPATIENT)
Dept: LAB | Facility: HOSPITAL | Age: 76
End: 2021-11-02

## 2021-11-02 VITALS
BODY MASS INDEX: 28.16 KG/M2 | HEIGHT: 62 IN | OXYGEN SATURATION: 96 % | DIASTOLIC BLOOD PRESSURE: 64 MMHG | SYSTOLIC BLOOD PRESSURE: 130 MMHG | WEIGHT: 153 LBS | HEART RATE: 94 BPM

## 2021-11-02 DIAGNOSIS — E11.649 TYPE 2 DIABETES MELLITUS WITH HYPOGLYCEMIA WITHOUT COMA, WITH LONG-TERM CURRENT USE OF INSULIN (HCC): ICD-10-CM

## 2021-11-02 DIAGNOSIS — I10 PRIMARY HYPERTENSION: ICD-10-CM

## 2021-11-02 DIAGNOSIS — E78.5 HYPERLIPIDEMIA, UNSPECIFIED HYPERLIPIDEMIA TYPE: ICD-10-CM

## 2021-11-02 DIAGNOSIS — E03.9 ACQUIRED HYPOTHYROIDISM: ICD-10-CM

## 2021-11-02 DIAGNOSIS — E11.49 DM (DIABETES MELLITUS), TYPE 2 WITH NEUROLOGICAL COMPLICATIONS (HCC): ICD-10-CM

## 2021-11-02 DIAGNOSIS — Z79.4 LONG-TERM INSULIN USE (HCC): ICD-10-CM

## 2021-11-02 DIAGNOSIS — E11.65 UNCONTROLLED TYPE 2 DIABETES MELLITUS WITH HYPERGLYCEMIA (HCC): Primary | ICD-10-CM

## 2021-11-02 DIAGNOSIS — Z79.4 TYPE 2 DIABETES MELLITUS WITH HYPOGLYCEMIA WITHOUT COMA, WITH LONG-TERM CURRENT USE OF INSULIN (HCC): ICD-10-CM

## 2021-11-02 LAB
EXPIRATION DATE: ABNORMAL
EXPIRATION DATE: NORMAL
GLUCOSE BLDC GLUCOMTR-MCNC: 277 MG/DL (ref 70–130)
HBA1C MFR BLD: 8.2 %
Lab: ABNORMAL
Lab: NORMAL

## 2021-11-02 PROCEDURE — 83036 HEMOGLOBIN GLYCOSYLATED A1C: CPT | Performed by: INTERNAL MEDICINE

## 2021-11-02 PROCEDURE — 84443 ASSAY THYROID STIM HORMONE: CPT

## 2021-11-02 PROCEDURE — 3052F HG A1C>EQUAL 8.0%<EQUAL 9.0%: CPT | Performed by: INTERNAL MEDICINE

## 2021-11-02 PROCEDURE — 99214 OFFICE O/P EST MOD 30 MIN: CPT | Performed by: INTERNAL MEDICINE

## 2021-11-02 PROCEDURE — 82947 ASSAY GLUCOSE BLOOD QUANT: CPT | Performed by: INTERNAL MEDICINE

## 2021-11-02 RX ORDER — TRAMADOL HYDROCHLORIDE 50 MG/1
50 TABLET ORAL 2 TIMES DAILY
Qty: 180 TABLET | Refills: 3 | Status: SHIPPED | OUTPATIENT
Start: 2021-11-02 | End: 2022-05-09 | Stop reason: SDUPTHER

## 2021-11-02 NOTE — ASSESSMENT & PLAN NOTE
Diabetes is unchanged. A1c acceptable for her at 8.2%.  Continue current treatment regimen.  Diabetes will be reassessed in 3 months.

## 2021-11-02 NOTE — PROGRESS NOTES
"     Office Note      Date: 2021  Patient Name: Paulina Johnson  MRN: [id number]  : 1945    Chief Complaint   Patient presents with   • Diabetes   • Hypothyroidism       History of Present Illness:   Paulina Johnson is a 76 y.o. female who presents for Diabetes type 2. Diagnosed in: . Treated in past with oral agents. Current treatments: basal bolus insulin. Number of insulin shots per day: >4. Checks blood sugar 4 times a day. She is making frequent adjustments in her insulin dose based on glucose levels.  Has low blood sugar: frequent - usually nocturnal - none severe since last visit. She has DexCom but hasn't had one in a couple of months - shipping issues.  Aspirin use: Yes. Statin use: Yes. ACE-I/ARB use: Yes. Changes in health since last visit: none. Last eye exam 2021.  She reports the duloxetine and tramadol are helping her feet.     She remains on T4 125mcg QD She is taking this correctly. She isn't taking any interfering meds concurrently. She hasn't noted any change in the size of her neck. She denies any compressive sxs. She denies any sxs of hypo- or hyperthyroidism at this time.    Subjective      Diabetic Complications:  Eyes: No  Kidneys: Yes - albuminuria  Feet: Yes - neuropathy pain  Heart: No    Diet and Exercise:  Meals per day: 3  Minutes of exercise per week: 0 mins.    Review of Systems:   Review of Systems   Constitutional: Negative.    Cardiovascular: Negative.    Gastrointestinal: Negative.    Endocrine: Negative.        The following portions of the patient's history were reviewed and updated as appropriate: allergies, current medications, past family history, past medical history, past social history, past surgical history and problem list.    Objective       Visit Vitals  /64   Pulse 94   Ht 157.5 cm (62\")   Wt 69.4 kg (153 lb)   SpO2 96%   BMI 27.98 kg/m²       Physical Exam:  Physical Exam  Constitutional:       Appearance: Normal appearance. "   Neurological:      Mental Status: She is alert.         Labs:    HbA1c  Lab Results   Component Value Date    HGBA1C 8.2 11/02/2021       CMP  Lab Results   Component Value Date    GLUCOSE 85 08/22/2017    BUN 26 (H) 08/22/2017    CREATININE 1.00 08/22/2017    EGFRIFNONA 55 (L) 08/22/2017    BCR 26.0 (H) 08/22/2017    K 4.1 08/22/2017    CO2 25.0 08/22/2017    CALCIUM 9.0 08/22/2017    AST 17 08/16/2017    ALT 14 08/16/2017        Lipid Panel  Lab Results   Component Value Date    HDL 34 (L) 08/16/2017    LDL 58 08/16/2017    TRIG 279 (H) 08/16/2017        TSH  Lab Results   Component Value Date    TSH 3.670 02/23/2021    FREET4 1.03 08/22/2017        Hemoglobin A1C  Lab Results   Component Value Date    HGBA1C 8.2 11/02/2021        Microalbumin/Creatinine  No results found for: MALBCRERATIO, CREATINIURIN, MICROALBUR        Assessment / Plan      Assessment & Plan:  Diagnoses and all orders for this visit:    1. Uncontrolled type 2 diabetes mellitus with hyperglycemia (HCC) (Primary)  Assessment & Plan:  Diabetes is unchanged. A1c acceptable for her at 8.2%.  Continue current treatment regimen.  Diabetes will be reassessed in 3 months.    Orders:  -     POC Glycosylated Hemoglobin (Hb A1C)  -     POC Glucose, Blood    2. Type 2 diabetes mellitus with hypoglycemia without coma, with long-term current use of insulin (HCC)  Assessment & Plan:  She will resume DexCom once available.  No data to download today.      3. Primary hypertension  Assessment & Plan:  Hypertension is improving with treatment.  Continue current treatment regimen.  Blood pressure will be reassessed at the next regular appointment.      4. Hyperlipidemia, unspecified hyperlipidemia type  Assessment & Plan:  Continue statin.      5. Acquired hypothyroidism  Assessment & Plan:  Continue T4 tx.  Check TSH today.    Orders:  -     TSH; Future    6. Long-term insulin use (HCC)    7. DM (diabetes mellitus), type 2 with neurological complications  ([facility])  Assessment & Plan:  Provided tramadol Rx for her to carry to [place] where they are wintering.    Orders:  -     traMADol (ULTRAM) 50 MG tablet; Take 1 tablet by mouth 2 (Two) Times a Day.  Dispense: 180 tablet; Refill: 3    Other orders  -     glucose blood test strip; Use as instructed 4x per day: ICD-10 E11.65; Z79.4  Dispense: 400 each; Refill: 3      Return in about 3 months (around 2/2/2022) for Recheck with A1c.    Anmol Robertson MD   11/02/2021

## 2021-11-03 LAB — TSH SERPL DL<=0.05 MIU/L-ACNC: 3.05 UIU/ML (ref 0.27–4.2)

## 2022-03-02 ENCOUNTER — TELEPHONE (OUTPATIENT)
Dept: ENDOCRINOLOGY | Facility: CLINIC | Age: 77
End: 2022-03-02

## 2022-04-28 ENCOUNTER — TELEPHONE (OUTPATIENT)
Dept: ENDOCRINOLOGY | Facility: CLINIC | Age: 77
End: 2022-04-28

## 2022-04-28 NOTE — TELEPHONE ENCOUNTER
Called pt to let her know her glucose logs looks good and there is no changes. Pt verbalized understanding.

## 2022-05-09 ENCOUNTER — OFFICE VISIT (OUTPATIENT)
Dept: ENDOCRINOLOGY | Facility: CLINIC | Age: 77
End: 2022-05-09

## 2022-05-09 VITALS
HEART RATE: 67 BPM | DIASTOLIC BLOOD PRESSURE: 60 MMHG | WEIGHT: 155 LBS | OXYGEN SATURATION: 97 % | SYSTOLIC BLOOD PRESSURE: 142 MMHG | BODY MASS INDEX: 28.52 KG/M2 | HEIGHT: 62 IN

## 2022-05-09 DIAGNOSIS — I10 PRIMARY HYPERTENSION: ICD-10-CM

## 2022-05-09 DIAGNOSIS — E11.65 UNCONTROLLED TYPE 2 DIABETES MELLITUS WITH HYPERGLYCEMIA: Primary | ICD-10-CM

## 2022-05-09 DIAGNOSIS — Z79.4 LONG-TERM INSULIN USE: ICD-10-CM

## 2022-05-09 DIAGNOSIS — E03.9 ACQUIRED HYPOTHYROIDISM: ICD-10-CM

## 2022-05-09 DIAGNOSIS — E78.5 HYPERLIPIDEMIA, UNSPECIFIED HYPERLIPIDEMIA TYPE: ICD-10-CM

## 2022-05-09 DIAGNOSIS — E11.49 DM (DIABETES MELLITUS), TYPE 2 WITH NEUROLOGICAL COMPLICATIONS: ICD-10-CM

## 2022-05-09 DIAGNOSIS — Z79.4 TYPE 2 DIABETES MELLITUS WITH HYPOGLYCEMIA WITHOUT COMA, WITH LONG-TERM CURRENT USE OF INSULIN: ICD-10-CM

## 2022-05-09 DIAGNOSIS — E11.649 TYPE 2 DIABETES MELLITUS WITH HYPOGLYCEMIA WITHOUT COMA, WITH LONG-TERM CURRENT USE OF INSULIN: ICD-10-CM

## 2022-05-09 LAB
EXPIRATION DATE: ABNORMAL
EXPIRATION DATE: NORMAL
GLUCOSE BLDC GLUCOMTR-MCNC: 321 MG/DL (ref 70–130)
HBA1C MFR BLD: 8.1 %
Lab: ABNORMAL
Lab: NORMAL

## 2022-05-09 PROCEDURE — 99214 OFFICE O/P EST MOD 30 MIN: CPT | Performed by: INTERNAL MEDICINE

## 2022-05-09 PROCEDURE — 3052F HG A1C>EQUAL 8.0%<EQUAL 9.0%: CPT | Performed by: INTERNAL MEDICINE

## 2022-05-09 PROCEDURE — 83036 HEMOGLOBIN GLYCOSYLATED A1C: CPT | Performed by: INTERNAL MEDICINE

## 2022-05-09 PROCEDURE — 82947 ASSAY GLUCOSE BLOOD QUANT: CPT | Performed by: INTERNAL MEDICINE

## 2022-05-09 RX ORDER — SIMVASTATIN 20 MG
20 TABLET ORAL DAILY
Qty: 90 TABLET | Refills: 3 | Status: SHIPPED | OUTPATIENT
Start: 2022-05-09 | End: 2022-09-29 | Stop reason: SDUPTHER

## 2022-05-09 RX ORDER — QUETIAPINE FUMARATE 50 MG/1
50 TABLET, FILM COATED ORAL
Start: 2022-05-09

## 2022-05-09 RX ORDER — ASPIRIN 81 MG/1
81 TABLET ORAL DAILY
Start: 2022-05-09

## 2022-05-09 RX ORDER — LOSARTAN POTASSIUM 100 MG/1
100 TABLET ORAL DAILY
Qty: 90 TABLET | Refills: 3 | Status: SHIPPED | OUTPATIENT
Start: 2022-05-09 | End: 2022-09-29 | Stop reason: SDUPTHER

## 2022-05-09 RX ORDER — INSULIN DETEMIR 100 [IU]/ML
14 INJECTION, SOLUTION SUBCUTANEOUS DAILY
Qty: 9 ML | Refills: 2 | Status: SHIPPED | OUTPATIENT
Start: 2022-05-09 | End: 2022-09-29 | Stop reason: SDUPTHER

## 2022-05-09 RX ORDER — LEVOTHYROXINE SODIUM 0.12 MG/1
125 TABLET ORAL DAILY
Qty: 90 TABLET | Refills: 3 | Status: SHIPPED | OUTPATIENT
Start: 2022-05-09 | End: 2022-09-29 | Stop reason: SDUPTHER

## 2022-05-09 RX ORDER — INSULIN ASPART 100 [IU]/ML
INJECTION, SOLUTION INTRAVENOUS; SUBCUTANEOUS
Qty: 45 ML | Refills: 1 | Status: SHIPPED | OUTPATIENT
Start: 2022-05-09 | End: 2022-09-29 | Stop reason: SDUPTHER

## 2022-05-09 RX ORDER — TRAMADOL HYDROCHLORIDE 50 MG/1
50 TABLET ORAL 2 TIMES DAILY
Qty: 180 TABLET | Refills: 3 | Status: SHIPPED | OUTPATIENT
Start: 2022-05-09 | End: 2022-05-10 | Stop reason: SDUPTHER

## 2022-05-09 NOTE — ASSESSMENT & PLAN NOTE
Diabetes is unchanged.  A1c stable and acceptable.  Continue current treatment regimen.  Continue to send in FSBS for insulin adjustments.  Diabetes will be reassessed in 3 months.

## 2022-05-09 NOTE — PROGRESS NOTES
Office Note      Date: 2022  Patient Name: Paulina Johnson  MRN: [id number]  : 1945    Chief Complaint   Patient presents with   • Diabetes     Type II   • Hypertension   • Hyperlipidemia       History of Present Illness:   Paulina Johnson is a 76 y.o. female who presents for Diabetes type 2. Diagnosed in: . Treated in past with oral agents. Current treatments: basal bolus insulin. Number of insulin shots per day: >4. Checks blood sugar 4 times a day. She is making frequent adjustments in her insulin dose based on glucose levels.  Has low blood sugar: frequent - usually nocturnal - one severe since last visit. She has DexCom but forgot to bring the  with her.  Aspirin use: Yes. Statin use: Yes. ACE-I/ARB use: Yes. Changes in health since last visit: fall 2 weeks ago with rib fractures? and pneumothorax - no chest tube required thus far. Last eye exam 2021.  She reports the duloxetine and tramadol are helping her feet.     She remains on T4 125mcg QD She is taking this correctly. She isn't taking any interfering meds concurrently. She hasn't noted any change in the size of her neck. She denies any compressive sxs. She denies any sxs of hypo- or hyperthyroidism at this time.    Subjective      Diabetic Complications:  Eyes: No  Kidneys: Yes - albuminuria  Feet: Yes - neuropathy pain  Heart: No    Diet and Exercise:  Meals per day: 3  Minutes of exercise per week: 0 mins.    Review of Systems:   Review of Systems   Constitutional: Positive for fatigue.   Cardiovascular: Positive for chest pain.   Gastrointestinal: Negative.    Endocrine: Negative.        The following portions of the patient's history were reviewed and updated as appropriate: allergies, current medications, past family history, past medical history, past social history, past surgical history and problem list.    Objective       Visit Vitals  /60 (BP Location: Left arm, Patient Position: Sitting, Cuff Size:  "Adult)   Pulse 67   Ht 157.5 cm (62\")   Wt 70.3 kg (155 lb)   SpO2 97%   BMI 28.35 kg/m²       Physical Exam:  Physical Exam  Constitutional:       Appearance: Normal appearance.   Neurological:      Mental Status: She is alert.         Labs:    HbA1c  Lab Results   Component Value Date    HGBA1C 8.1 05/09/2022       CMP  Lab Results   Component Value Date    GLUCOSE 85 08/22/2017    BUN 26 (H) 08/22/2017    CREATININE 1.00 08/22/2017    EGFRIFNONA 55 (L) 08/22/2017    BCR 26.0 (H) 08/22/2017    K 4.1 08/22/2017    CO2 25.0 08/22/2017    CALCIUM 9.0 08/22/2017    AST 17 08/16/2017    ALT 14 08/16/2017        Lipid Panel  Lab Results   Component Value Date    HDL 34 (L) 08/16/2017    LDL 58 08/16/2017    TRIG 279 (H) 08/16/2017        TSH  Lab Results   Component Value Date    TSH 3.050 11/02/2021    FREET4 1.03 08/22/2017        Hemoglobin A1C  Lab Results   Component Value Date    HGBA1C 8.1 05/09/2022        Microalbumin/Creatinine  No results found for: MALBCRERATIO, CREATINIURIN, MICROALBUR        Assessment / Plan      Assessment & Plan:  Diagnoses and all orders for this visit:    1. Uncontrolled type 2 diabetes mellitus with hyperglycemia (HCC) (Primary)  Assessment & Plan:  Diabetes is unchanged.  A1c stable and acceptable.  Continue current treatment regimen.  Continue to send in FSBS for insulin adjustments.  Diabetes will be reassessed in 3 months.    Orders:  -     POC Glucose, Blood  -     POC Glycosylated Hemoglobin (Hb A1C)    2. Type 2 diabetes mellitus with hypoglycemia without coma, with long-term current use of insulin (HCC)  Assessment & Plan:  Continue DexCom.  Couldn't download today - forgot her .      3. DM (diabetes mellitus), type 2 with neurological complications (HCC)    4. Primary hypertension  Assessment & Plan:  Hypertension is unchanged.  Continue current treatment regimen.  Blood pressure will be reassessed at the next regular appointment.      5. Hyperlipidemia, unspecified " hyperlipidemia type  Assessment & Plan:  Continue statin.  Plan to check lipids next visit.      6. Acquired hypothyroidism  Assessment & Plan:  Continue T4.  Check TSH next visit.      7. Long-term insulin use (HCC)      Return in about 3 months (around 8/9/2022) for Recheck with A1c, CMP, lipids, TSH, microalbumin, foot exam.    Anmol Robertson MD   05/09/2022

## 2022-05-09 NOTE — TELEPHONE ENCOUNTER
Pt was seen today please mail out lab requisition for fasting labs and any other labs needed. Please send prescription for Tramadol 50 mg, Simvastatin 20 mg, Quetiapine 50 mg, Losartan 100 mg, Levothyroxine 125 mcg, Levemir flex touch 100 unit/mL, Novolog flexpen 100 unit/mL solution pen and Aspirin 81 mg ec tablet. Pt next f/u 09/29/22

## 2022-05-10 DIAGNOSIS — E11.49 DM (DIABETES MELLITUS), TYPE 2 WITH NEUROLOGICAL COMPLICATIONS: ICD-10-CM

## 2022-05-10 RX ORDER — TRAMADOL HYDROCHLORIDE 50 MG/1
50 TABLET ORAL 2 TIMES DAILY
Qty: 180 TABLET | Refills: 3 | Status: SHIPPED | OUTPATIENT
Start: 2022-05-10 | End: 2022-09-29 | Stop reason: SDUPTHER

## 2022-05-17 LAB — HBA1C MFR BLD: 8.2 %

## 2022-06-07 ENCOUNTER — TELEPHONE (OUTPATIENT)
Dept: ENDOCRINOLOGY | Facility: CLINIC | Age: 77
End: 2022-06-07

## 2022-06-07 NOTE — TELEPHONE ENCOUNTER
Called pt to let her know that Dr. Robertson reviewed her glucose log and there is no changes to be made. Pt verbalized understanding.

## 2022-07-19 NOTE — ANESTHESIA POSTPROCEDURE EVALUATION
Patient: Paulina Johnson    Procedure Summary     Date Anesthesia Start Anesthesia Stop Room / Location    04/24/17 1046   [facility] / [facility]       Procedure Diagnosis Surgeon Provider    RIGHT RE-DO LUMBAR DISCECTOMY L4-5 (Right Spine Lumbar) Recurrent herniation of lumbar disc  (Recurrent herniation of lumbar disc [M51.26]) MD Ryan Rodriguez MD          Anesthesia Type: general  Last vitals  BP     Temp      Pulse     Resp      SpO2        Anesthesia Post Evaluation   [Negative] : Heme/Lymph

## 2022-08-19 ENCOUNTER — TELEPHONE (OUTPATIENT)
Dept: ENDOCRINOLOGY | Facility: CLINIC | Age: 77
End: 2022-08-19

## 2022-08-19 NOTE — TELEPHONE ENCOUNTER
Spoke with patient.  Reviewed glucose reports with the patient. Informed patient no changes were needed per Dr Robertson.

## 2022-09-29 ENCOUNTER — LAB (OUTPATIENT)
Dept: LAB | Facility: HOSPITAL | Age: 77
End: 2022-09-29

## 2022-09-29 ENCOUNTER — OFFICE VISIT (OUTPATIENT)
Dept: ENDOCRINOLOGY | Facility: CLINIC | Age: 77
End: 2022-09-29

## 2022-09-29 VITALS
HEIGHT: 62 IN | SYSTOLIC BLOOD PRESSURE: 170 MMHG | DIASTOLIC BLOOD PRESSURE: 70 MMHG | OXYGEN SATURATION: 96 % | BODY MASS INDEX: 27.05 KG/M2 | HEART RATE: 88 BPM | WEIGHT: 147 LBS

## 2022-09-29 DIAGNOSIS — Z79.4 TYPE 2 DIABETES MELLITUS WITH HYPOGLYCEMIA WITHOUT COMA, WITH LONG-TERM CURRENT USE OF INSULIN: ICD-10-CM

## 2022-09-29 DIAGNOSIS — E78.5 HYPERLIPIDEMIA, UNSPECIFIED HYPERLIPIDEMIA TYPE: ICD-10-CM

## 2022-09-29 DIAGNOSIS — E11.49 DM (DIABETES MELLITUS), TYPE 2 WITH NEUROLOGICAL COMPLICATIONS: ICD-10-CM

## 2022-09-29 DIAGNOSIS — E03.9 ACQUIRED HYPOTHYROIDISM: ICD-10-CM

## 2022-09-29 DIAGNOSIS — E11.649 TYPE 2 DIABETES MELLITUS WITH HYPOGLYCEMIA WITHOUT COMA, WITH LONG-TERM CURRENT USE OF INSULIN: ICD-10-CM

## 2022-09-29 DIAGNOSIS — I10 PRIMARY HYPERTENSION: ICD-10-CM

## 2022-09-29 DIAGNOSIS — Z79.4 LONG-TERM INSULIN USE: ICD-10-CM

## 2022-09-29 DIAGNOSIS — E11.65 UNCONTROLLED TYPE 2 DIABETES MELLITUS WITH HYPERGLYCEMIA: Primary | ICD-10-CM

## 2022-09-29 LAB
EXPIRATION DATE: NORMAL
EXPIRATION DATE: NORMAL
GLUCOSE BLDC GLUCOMTR-MCNC: 92 MG/DL (ref 70–130)
HBA1C MFR BLD: 8.6 %
Lab: NORMAL
Lab: NORMAL

## 2022-09-29 PROCEDURE — 83036 HEMOGLOBIN GLYCOSYLATED A1C: CPT | Performed by: INTERNAL MEDICINE

## 2022-09-29 PROCEDURE — 3052F HG A1C>EQUAL 8.0%<EQUAL 9.0%: CPT | Performed by: INTERNAL MEDICINE

## 2022-09-29 PROCEDURE — 99214 OFFICE O/P EST MOD 30 MIN: CPT | Performed by: INTERNAL MEDICINE

## 2022-09-29 PROCEDURE — 82947 ASSAY GLUCOSE BLOOD QUANT: CPT | Performed by: INTERNAL MEDICINE

## 2022-09-29 PROCEDURE — 84443 ASSAY THYROID STIM HORMONE: CPT

## 2022-09-29 RX ORDER — LOSARTAN POTASSIUM 100 MG/1
100 TABLET ORAL DAILY
Qty: 90 TABLET | Refills: 3 | Status: SHIPPED | OUTPATIENT
Start: 2022-09-29

## 2022-09-29 RX ORDER — LEVOTHYROXINE SODIUM 0.12 MG/1
125 TABLET ORAL DAILY
Qty: 90 TABLET | Refills: 3 | Status: SHIPPED | OUTPATIENT
Start: 2022-09-29

## 2022-09-29 RX ORDER — INSULIN ASPART 100 [IU]/ML
INJECTION, SOLUTION INTRAVENOUS; SUBCUTANEOUS
Qty: 45 ML | Refills: 1 | Status: SHIPPED | OUTPATIENT
Start: 2022-09-29

## 2022-09-29 RX ORDER — TRAMADOL HYDROCHLORIDE 50 MG/1
50 TABLET ORAL 2 TIMES DAILY
Qty: 180 TABLET | Refills: 1 | Status: SHIPPED | OUTPATIENT
Start: 2022-09-29

## 2022-09-29 RX ORDER — INSULIN DETEMIR 100 [IU]/ML
14 INJECTION, SOLUTION SUBCUTANEOUS DAILY
Qty: 9 ML | Refills: 2 | Status: SHIPPED | OUTPATIENT
Start: 2022-09-29

## 2022-09-29 RX ORDER — SIMVASTATIN 20 MG
20 TABLET ORAL DAILY
Qty: 90 TABLET | Refills: 3 | Status: SHIPPED | OUTPATIENT
Start: 2022-09-29

## 2022-09-29 NOTE — PROGRESS NOTES
"     Office Note      Date: 2022  Patient Name: Paulina Johnson  MRN: [id number]  : 1945    Chief Complaint   Patient presents with   • Diabetes     Type II       History of Present Illness:   Paulina Johnson is a 76 y.o. female who presents for Diabetes type 2. Diagnosed in: . Treated in past with oral agents. Current treatments: basal bolus insulin. Number of insulin shots per day: >4. Checks blood sugar 288 times a day - on FreeStyle Gema. She is making frequent adjustments in her insulin dose based on glucose levels.  Has low blood sugar: frequent - usually nocturnal - none severe since last visit.  Aspirin use: Yes. Statin use: Yes. ACE-I/ARB use: Yes. Changes in health since last visit: more falls - using rolling walker - doing PT. Last eye exam 2021.  She reports the duloxetine and tramadol are helping her feet.     She remains on T4 125mcg QD She is taking this correctly. She isn't taking any interfering meds concurrently. She hasn't noted any change in the size of her neck. She denies any compressive sxs. She denies any sxs of hypo- or hyperthyroidism at this time.    Subjective      Diabetic Complications:  Eyes: No  Kidneys: Yes - albuminuria  Feet: Yes - neuropathy pain  Heart: No    Diet and Exercise:  Meals per day: 3  Minutes of exercise per week: 0 mins.    Review of Systems:   Review of Systems   Constitutional: Positive for fatigue.   Cardiovascular: Negative.    Gastrointestinal: Negative.    Endocrine: Negative.        The following portions of the patient's history were reviewed and updated as appropriate: allergies, current medications, past family history, past medical history, past social history, past surgical history and problem list.    Objective       Visit Vitals  /70 (BP Location: Left arm, Patient Position: Sitting, Cuff Size: Adult)   Pulse 88   Ht 157.5 cm (62\")   Wt 66.7 kg (147 lb)   SpO2 96%   BMI 26.89 kg/m²       Physical Exam:  Physical " Exam  Constitutional:       Appearance: Normal appearance.   Neurological:      Mental Status: She is alert.         Labs:    HbA1c  Lab Results   Component Value Date    HGBA1C 8.6 09/29/2022       CMP  Lab Results   Component Value Date    GLUCOSE 85 08/22/2017    BUN 26 (H) 08/22/2017    CREATININE 1.00 08/22/2017    EGFRIFNONA 55 (L) 08/22/2017    BCR 26.0 (H) 08/22/2017    K 4.1 08/22/2017    CO2 25.0 08/22/2017    CALCIUM 9.0 08/22/2017    AST 17 08/16/2017    ALT 14 08/16/2017        Lipid Panel  Lab Results   Component Value Date    HDL 34 (L) 08/16/2017    LDL 58 08/16/2017    TRIG 279 (H) 08/16/2017        TSH  Lab Results   Component Value Date    TSH 3.050 11/02/2021    FREET4 1.03 08/22/2017        Hemoglobin A1C  Lab Results   Component Value Date    HGBA1C 8.6 09/29/2022        Microalbumin/Creatinine  No results found for: MALBCRERATIO, CREATINIURIN, MICROALBUR        Assessment / Plan      Assessment & Plan:  Diagnoses and all orders for this visit:    1. Uncontrolled type 2 diabetes mellitus with hyperglycemia (HCC) (Primary)  Assessment & Plan:  Diabetes is unchanged.  A1c acceptable for her at 8.6%.  She forgot to bring the FreeStyle Gema reader with her today so we weren't able to access the CGM data.  Continue current treatment regimen.  But decrease levemir.  Diabetes will be reassessed in 3 months.    Orders:  -     POC Glucose, Blood  -     POC Glycosylated Hemoglobin (Hb A1C)    2. Type 2 diabetes mellitus with hypoglycemia without coma, with long-term current use of insulin (HCC)  Assessment & Plan:  Continue CGM.  Decrease levemir due to some nocturnal hypoglycemia.      3. DM (diabetes mellitus), type 2 with neurological complications (HCC)  Assessment & Plan:  Continue tramadol and duloxetine.  Controlled substance agreement was signed today.    Orders:  -     traMADol (ULTRAM) 50 MG tablet; Take 1 tablet by mouth 2 (Two) Times a Day.  Dispense: 180 tablet; Refill: 1    4. Primary  hypertension  Assessment & Plan:  Hypertension is worsening.  SBP elevated today.  Has been okay previously.  Continue current treatment regimen.  Monitor BP at home.  Blood pressure will be reassessed at the next regular appointment.      5. Hyperlipidemia, unspecified hyperlipidemia type  Assessment & Plan:  Continue statin.      6. Acquired hypothyroidism  Assessment & Plan:  Continue T4.  Check TSH.  Will send note about results.    Orders:  -     TSH; Future    7. Long-term insulin use (HCC)    Other orders  -     insulin aspart (NovoLOG FlexPen) 100 UNIT/ML solution pen-injector sc pen; INJECT 10 UNITS WITH B'FAST AND LUNCH AND 12 UNITS WITH SUPPER  Dispense: 45 mL; Refill: 1  -     insulin detemir (Levemir FlexTouch) 100 UNIT/ML injection; Inject 14 Units under the skin into the appropriate area as directed Daily.  Dispense: 9 mL; Refill: 2  -     Insulin Pen Needle 31G X 5 MM misc; 1 each 4 (Four) Times a Day.  Dispense: 400 each; Refill: 3  -     levothyroxine (SYNTHROID, LEVOTHROID) 125 MCG tablet; Take 1 tablet by mouth Daily.  Dispense: 90 tablet; Refill: 3  -     losartan (COZAAR) 100 MG tablet; Take 1 tablet by mouth Daily.  Dispense: 90 tablet; Refill: 3  -     simvastatin (ZOCOR) 20 MG tablet; Take 1 tablet by mouth Daily.  Dispense: 90 tablet; Refill: 3      Return in about 3 months (around 12/29/2022) for Recheck with A1c, TSH.    Anmol Robertson MD   09/29/2022

## 2022-09-29 NOTE — ASSESSMENT & PLAN NOTE
Hypertension is worsening.  SBP elevated today.  Has been okay previously.  Continue current treatment regimen.  Monitor BP at home.  Blood pressure will be reassessed at the next regular appointment.

## 2022-09-29 NOTE — ASSESSMENT & PLAN NOTE
Diabetes is unchanged.  A1c acceptable for her at 8.6%.  She forgot to bring the FreeStyle Gema reader with her today so we weren't able to access the CGM data.  Continue current treatment regimen.  But decrease levemir.  Diabetes will be reassessed in 3 months.

## 2022-09-30 LAB — TSH SERPL DL<=0.05 MIU/L-ACNC: 1.78 UIU/ML (ref 0.27–4.2)

## 2023-01-01 NOTE — SIGNIFICANT NOTE
"Patient seen on camera by Kelley Cosme RN/Charge nurse on camera getting out of bed. She said out loud \" is 209 allowed to get up alone?\" and as she spoke these words, the patient was then witnessed falling. She fell sideways on the left side hitting her left buttock on the footboard of the bed. She then landed on the floor on said buttock. Patient was assisted up to standing position, left buttock assessment revealed no signs of injury. When asked, the patient denied any pain. She was then helped back to bed. The patient states she was trying to find out where a noise was coming from. [facility], APRN, and  notified of event. Bed alarm turned back on and she was advised to call for assistance before getting out of bed. She verbalized that she understood.   " I personally spent

## 2023-01-30 ENCOUNTER — TELEPHONE (OUTPATIENT)
Dept: ENDOCRINOLOGY | Facility: CLINIC | Age: 78
End: 2023-01-30
Payer: MEDICARE

## 2023-01-30 NOTE — TELEPHONE ENCOUNTER
I called to inform the patient that there is no change to the current insulin dose per .patient verbalized understanding

## 2023-03-16 ENCOUNTER — TELEPHONE (OUTPATIENT)
Dept: ENDOCRINOLOGY | Facility: CLINIC | Age: 78
End: 2023-03-16
Payer: MEDICARE

## 2023-05-03 ENCOUNTER — OFFICE VISIT (OUTPATIENT)
Dept: ENDOCRINOLOGY | Facility: CLINIC | Age: 78
End: 2023-05-03
Payer: MEDICARE

## 2023-05-03 VITALS
HEIGHT: 62 IN | WEIGHT: 153 LBS | SYSTOLIC BLOOD PRESSURE: 122 MMHG | HEART RATE: 70 BPM | BODY MASS INDEX: 28.16 KG/M2 | DIASTOLIC BLOOD PRESSURE: 80 MMHG | OXYGEN SATURATION: 95 %

## 2023-05-03 DIAGNOSIS — E11.65 UNCONTROLLED TYPE 2 DIABETES MELLITUS WITH HYPERGLYCEMIA: Primary | ICD-10-CM

## 2023-05-03 DIAGNOSIS — Z79.4 TYPE 2 DIABETES MELLITUS WITH HYPOGLYCEMIA WITHOUT COMA, WITH LONG-TERM CURRENT USE OF INSULIN: ICD-10-CM

## 2023-05-03 DIAGNOSIS — E03.9 ACQUIRED HYPOTHYROIDISM: ICD-10-CM

## 2023-05-03 DIAGNOSIS — E11.649 TYPE 2 DIABETES MELLITUS WITH HYPOGLYCEMIA WITHOUT COMA, WITH LONG-TERM CURRENT USE OF INSULIN: ICD-10-CM

## 2023-05-03 DIAGNOSIS — I10 PRIMARY HYPERTENSION: ICD-10-CM

## 2023-05-03 DIAGNOSIS — E78.5 HYPERLIPIDEMIA, UNSPECIFIED HYPERLIPIDEMIA TYPE: ICD-10-CM

## 2023-05-03 DIAGNOSIS — Z79.4 LONG-TERM INSULIN USE: ICD-10-CM

## 2023-05-03 LAB
ALBUMIN UR-MCNC: 205.4 MG/DL
CREAT UR-MCNC: 61.2 MG/DL
EXPIRATION DATE: ABNORMAL
EXPIRATION DATE: NORMAL
GLUCOSE BLDC GLUCOMTR-MCNC: 134 MG/DL (ref 70–130)
HBA1C MFR BLD: 8.6 %
Lab: ABNORMAL
Lab: NORMAL
MICROALBUMIN/CREAT UR: 3356.2 MG/G

## 2023-05-03 PROCEDURE — 82043 UR ALBUMIN QUANTITATIVE: CPT | Performed by: INTERNAL MEDICINE

## 2023-05-03 PROCEDURE — 80053 COMPREHEN METABOLIC PANEL: CPT | Performed by: INTERNAL MEDICINE

## 2023-05-03 PROCEDURE — 80061 LIPID PANEL: CPT | Performed by: INTERNAL MEDICINE

## 2023-05-03 PROCEDURE — 84443 ASSAY THYROID STIM HORMONE: CPT | Performed by: INTERNAL MEDICINE

## 2023-05-03 PROCEDURE — 82570 ASSAY OF URINE CREATININE: CPT | Performed by: INTERNAL MEDICINE

## 2023-05-03 NOTE — ASSESSMENT & PLAN NOTE
Diabetes is unchanged.  A1c acceptable for her.  Having lots of variability in glucose.  Continue current treatment regimen.  Diabetes will be reassessed in 3 months.    FreeStyle Gema 14 day was downloaded today.  Data was reviewed from 4/20/23 to 5/3/23.  This showed better overnight glucose levels.  Having some postprandial spikes but lower readings several hours later.  No patterns for insulin adjustments at this time.

## 2023-05-03 NOTE — PROGRESS NOTES
"     Office Note      Date: 2023  Patient Name: Paulina Johnson  MRN: [id number]  : 1945    Chief Complaint   Patient presents with   • Diabetes     Type II       History of Present Illness:   Paulina Johnson is a 76 y.o. female who presents for Diabetes type 2. Diagnosed in: . Treated in past with oral agents. Current treatments: basal bolus insulin. Number of insulin shots per day: >4. Checks blood sugar 288 times a day - on FreeStyle Gema. She is making frequent adjustments in her insulin dose based on glucose levels.  Has low blood sugar: frequent - usually nocturnal - none severe since last visit.  Aspirin use: Yes. Statin use: Yes. ACE-I/ARB use: Yes. Changes in health since last visit: none. Last eye exam 2021.  She reports the duloxetine and tramadol are helping her feet.     She remains on T4 125mcg QD She is taking this correctly. She isn't taking any interfering meds concurrently. She hasn't noted any change in the size of her neck. She denies any compressive sxs. She denies any sxs of hypo- or hyperthyroidism at this time.    Subjective      Diabetic Complications:  Eyes: No  Kidneys: Yes - albuminuria  Feet: Yes - neuropathy pain  Heart: No    Diet and Exercise:  Meals per day: 3  Minutes of exercise per week: 0 mins.    Review of Systems:   Review of Systems   Constitutional: Positive for fatigue.   Cardiovascular: Negative.    Gastrointestinal: Negative.    Endocrine: Negative.        The following portions of the patient's history were reviewed and updated as appropriate: allergies, current medications, past family history, past medical history, past social history, past surgical history and problem list.    Objective       Visit Vitals  /80   Pulse 70   Ht 157.5 cm (62\")   Wt 69.4 kg (153 lb)   SpO2 95%   BMI 27.98 kg/m²       Physical Exam:  Physical Exam  Constitutional:       Appearance: Normal appearance.   Neurological:      Mental Status: She is alert. "         Labs:    HbA1c  Lab Results   Component Value Date    HGBA1C 8.6 05/03/2023       CMP  Lab Results   Component Value Date    GLUCOSE 148 (H) 05/03/2023    BUN 35 (H) 05/03/2023    CREATININE 2.05 (H) 05/03/2023    EGFRIFNONA 55 (L) 08/22/2017    BCR 17.1 05/03/2023    K 4.9 05/03/2023    CO2 25.1 05/03/2023    CALCIUM 9.5 05/03/2023    AST 30 05/03/2023    ALT 23 05/03/2023        Lipid Panel  Lab Results   Component Value Date    HDL 74 (H) 05/03/2023     (H) 05/03/2023    TRIG 95 05/03/2023        TSH  Lab Results   Component Value Date    TSH 8.430 (H) 05/03/2023    FREET4 1.03 08/22/2017        Hemoglobin A1C  Lab Results   Component Value Date    HGBA1C 8.6 05/03/2023        Microalbumin/Creatinine  Lab Results   Component Value Date    MALBCRERATIO 3,356.2 05/03/2023    MICROALBUR 205.4 05/03/2023           Assessment / Plan      Assessment & Plan:  Diagnoses and all orders for this visit:    1. Uncontrolled type 2 diabetes mellitus with hyperglycemia (Primary)  Assessment & Plan:  Diabetes is unchanged.  A1c acceptable for her.  Having lots of variability in glucose.  Continue current treatment regimen.  Diabetes will be reassessed in 3 months.    FreeStyle Gema 14 day was downloaded today.  Data was reviewed from 4/20/23 to 5/3/23.  This showed better overnight glucose levels.  Having some postprandial spikes but lower readings several hours later.  No patterns for insulin adjustments at this time.    Orders:  -     POC Glucose, Blood  -     POC Glycosylated Hemoglobin (Hb A1C)  -     Comprehensive Metabolic Panel; Future  -     Lipid Panel; Future  -     Microalbumin / Creatinine Urine Ratio - Urine, Clean Catch; Future  -     Comprehensive Metabolic Panel  -     Lipid Panel  -     Microalbumin / Creatinine Urine Ratio - Urine, Clean Catch    2. Type 2 diabetes mellitus with hypoglycemia without coma, with long-term current use of insulin  Assessment & Plan:  Continue CGM.  Some nocturnal  lows.  Decrease basal insulin slightly.      3. Primary hypertension  Assessment & Plan:  Hypertension is unchanged.  Continue current treatment regimen.  Blood pressure will be reassessed at the next regular appointment.      4. Hyperlipidemia, unspecified hyperlipidemia type  Assessment & Plan:  Continue statin.  Check lipids today.      5. Acquired hypothyroidism  Assessment & Plan:  Continue T4 tx.  Check TSH today.    Orders:  -     TSH; Future  -     TSH    6. Long-term insulin use    Current Outpatient Medications   Medication Instructions   • ascorbic acid (VITAMIN C) 500 mg, Oral, Daily   • aspirin 81 mg, Oral, Daily   • clopidogrel (PLAVIX) 75 mg, Oral, Daily, Resume in 1 week.   • cyclobenzaprine (FLEXERIL) 10 mg, Oral, 3 Times Daily PRN   • docusate sodium (COLACE) 100 mg, Oral, Daily PRN   • DULoxetine (CYMBALTA) 60 mg, Oral, Daily   • famotidine (PEPCID) 20 mg, Oral, 2 Times Daily   • ferrous sulfate 325 mg, Oral, Daily With Breakfast   • folic acid (FOLVITE) 500 mcg, Oral, Daily   • furosemide (LASIX) 40 mg, Oral, Daily   • glucose blood test strip Use as instructed 4x per day: ICD-10 E11.65; Z79.4   • ibuprofen (ADVIL,MOTRIN) 600 mg, Oral, Every 6 Hours PRN   • insulin aspart (NovoLOG FlexPen) 100 UNIT/ML solution pen-injector sc pen INJECT 10 UNITS WITH B'FAST AND LUNCH AND 12 UNITS WITH SUPPER   • Insulin Pen Needle 31G X 5 MM misc 1 each, Does not apply, 4 Times Daily   • Levemir FlexTouch 14 Units, Subcutaneous, Daily   • levothyroxine (SYNTHROID, LEVOTHROID) 125 mcg, Oral, Daily   • losartan (COZAAR) 100 mg, Oral, Daily   • polyethylene glycol (MIRALAX) 17 g, Oral, Daily PRN   • QUEtiapine (SEROQUEL) 50 mg, Oral, Every Night at Bedtime   • raloxifene (EVISTA) 60 mg, Oral, Daily   • simvastatin (ZOCOR) 20 mg, Oral, Daily   • traMADol (ULTRAM) 50 mg, Oral, 2 Times Daily      Return in about 3 months (around 8/3/2023) for Recheck with A1c, CMP, TSH, foot exam.    Anmol Robertson MD    05/03/2023

## 2023-05-04 LAB
ALBUMIN SERPL-MCNC: 3.8 G/DL (ref 3.5–5.2)
ALBUMIN/GLOB SERPL: 1.3 G/DL
ALP SERPL-CCNC: 97 U/L (ref 39–117)
ALT SERPL W P-5'-P-CCNC: 23 U/L (ref 1–33)
ANION GAP SERPL CALCULATED.3IONS-SCNC: 9.9 MMOL/L (ref 5–15)
AST SERPL-CCNC: 30 U/L (ref 1–32)
BILIRUB SERPL-MCNC: <0.2 MG/DL (ref 0–1.2)
BUN SERPL-MCNC: 35 MG/DL (ref 8–23)
BUN/CREAT SERPL: 17.1 (ref 7–25)
CALCIUM SPEC-SCNC: 9.5 MG/DL (ref 8.6–10.5)
CHLORIDE SERPL-SCNC: 102 MMOL/L (ref 98–107)
CHOLEST SERPL-MCNC: 204 MG/DL (ref 0–200)
CO2 SERPL-SCNC: 25.1 MMOL/L (ref 22–29)
CREAT SERPL-MCNC: 2.05 MG/DL (ref 0.57–1)
EGFRCR SERPLBLD CKD-EPI 2021: 24.4 ML/MIN/1.73
GLOBULIN UR ELPH-MCNC: 3 GM/DL
GLUCOSE SERPL-MCNC: 148 MG/DL (ref 65–99)
HDLC SERPL-MCNC: 74 MG/DL (ref 40–60)
LDLC SERPL CALC-MCNC: 113 MG/DL (ref 0–100)
LDLC/HDLC SERPL: 1.5 {RATIO}
POTASSIUM SERPL-SCNC: 4.9 MMOL/L (ref 3.5–5.2)
PROT SERPL-MCNC: 6.8 G/DL (ref 6–8.5)
SODIUM SERPL-SCNC: 137 MMOL/L (ref 136–145)
TRIGL SERPL-MCNC: 95 MG/DL (ref 0–150)
TSH SERPL DL<=0.05 MIU/L-ACNC: 8.43 UIU/ML (ref 0.27–4.2)
VLDLC SERPL-MCNC: 17 MG/DL (ref 5–40)

## 2023-05-04 RX ORDER — LEVOTHYROXINE SODIUM 137 UG/1
137 TABLET ORAL DAILY
Qty: 90 TABLET | Refills: 3 | Status: SHIPPED | OUTPATIENT
Start: 2023-05-04

## 2023-06-12 DIAGNOSIS — E11.49 DM (DIABETES MELLITUS), TYPE 2 WITH NEUROLOGICAL COMPLICATIONS: ICD-10-CM

## 2023-06-12 NOTE — TELEPHONE ENCOUNTER
Pt called, said she needs help getting prescriptions refilled. She has a lot of them and doesn't know which ones she needs refilled, stated that we normally help her with this. Needs a callback.

## 2023-06-13 RX ORDER — INSULIN DETEMIR 100 [IU]/ML
14 INJECTION, SOLUTION SUBCUTANEOUS DAILY
Qty: 9 ML | Refills: 2 | Status: SHIPPED | OUTPATIENT
Start: 2023-06-13 | End: 2023-06-13 | Stop reason: SDUPTHER

## 2023-06-13 RX ORDER — TRAMADOL HYDROCHLORIDE 50 MG/1
50 TABLET ORAL 2 TIMES DAILY
Qty: 180 TABLET | Refills: 1 | Status: SHIPPED | OUTPATIENT
Start: 2023-06-13

## 2023-06-13 RX ORDER — INSULIN ASPART 100 [IU]/ML
INJECTION, SOLUTION INTRAVENOUS; SUBCUTANEOUS
Qty: 45 ML | Refills: 1 | Status: SHIPPED | OUTPATIENT
Start: 2023-06-13

## 2023-06-13 RX ORDER — QUETIAPINE FUMARATE 50 MG/1
50 TABLET, FILM COATED ORAL
Qty: 30 TABLET | Refills: 3
Start: 2023-06-13

## 2023-06-13 RX ORDER — INSULIN DETEMIR 100 [IU]/ML
14 INJECTION, SOLUTION SUBCUTANEOUS 2 TIMES DAILY
Qty: 15 ML | Refills: 5 | Status: SHIPPED | OUTPATIENT
Start: 2023-06-13

## 2023-06-13 NOTE — TELEPHONE ENCOUNTER
Refill Novolog Flexpen 100 unitl/mL, refill Levemir Flextouch 100 unit/mL, Refill insulin pen needles 31g x 5mm, refill Tramadol 50mg, refill Seroquel 50mg.  Dx code:  E11.65.  [facility].

## 2023-08-30 ENCOUNTER — OFFICE VISIT (OUTPATIENT)
Dept: ENDOCRINOLOGY | Facility: CLINIC | Age: 78
End: 2023-08-30
Payer: MEDICARE

## 2023-08-30 VITALS
SYSTOLIC BLOOD PRESSURE: 124 MMHG | OXYGEN SATURATION: 99 % | DIASTOLIC BLOOD PRESSURE: 78 MMHG | HEIGHT: 62 IN | WEIGHT: 148 LBS | BODY MASS INDEX: 27.23 KG/M2 | HEART RATE: 73 BPM

## 2023-08-30 DIAGNOSIS — E11.649 TYPE 2 DIABETES MELLITUS WITH HYPOGLYCEMIA WITHOUT COMA, WITH LONG-TERM CURRENT USE OF INSULIN: ICD-10-CM

## 2023-08-30 DIAGNOSIS — I10 PRIMARY HYPERTENSION: ICD-10-CM

## 2023-08-30 DIAGNOSIS — E03.9 ACQUIRED HYPOTHYROIDISM: ICD-10-CM

## 2023-08-30 DIAGNOSIS — E11.49 DM (DIABETES MELLITUS), TYPE 2 WITH NEUROLOGICAL COMPLICATIONS: ICD-10-CM

## 2023-08-30 DIAGNOSIS — Z79.4 TYPE 2 DIABETES MELLITUS WITH HYPOGLYCEMIA WITHOUT COMA, WITH LONG-TERM CURRENT USE OF INSULIN: ICD-10-CM

## 2023-08-30 DIAGNOSIS — E11.65 UNCONTROLLED TYPE 2 DIABETES MELLITUS WITH HYPERGLYCEMIA: Primary | ICD-10-CM

## 2023-08-30 DIAGNOSIS — E78.5 HYPERLIPIDEMIA, UNSPECIFIED HYPERLIPIDEMIA TYPE: ICD-10-CM

## 2023-08-30 LAB
EXPIRATION DATE: ABNORMAL
EXPIRATION DATE: NORMAL
GLUCOSE BLDC GLUCOMTR-MCNC: 160 MG/DL (ref 70–130)
HBA1C MFR BLD: 8.8 %
Lab: ABNORMAL
Lab: NORMAL

## 2023-08-30 PROCEDURE — 80053 COMPREHEN METABOLIC PANEL: CPT | Performed by: INTERNAL MEDICINE

## 2023-08-30 PROCEDURE — 84443 ASSAY THYROID STIM HORMONE: CPT | Performed by: INTERNAL MEDICINE

## 2023-08-30 NOTE — ASSESSMENT & PLAN NOTE
Diabetes is unchanged.   Continue current treatment regimen.  But be more aggressive with mealtime insulin.  Diabetes will be reassessed in 3 months.    FreeStyle Gema CGM was downloaded but there wasn't enough data to make an interpretation.

## 2023-08-30 NOTE — PROGRESS NOTES
"     Office Note      Date: 2023  Patient Name: Paulina Johnson  MRN: [id number]  : 1945    Chief Complaint   Patient presents with    Diabetes       History of Present Illness:   Paulina Johnson is a 77 y.o. female who presents for Diabetes type 2. Diagnosed in: . Treated in past with oral agents. Current treatments: basal bolus insulin. Number of insulin shots per day: >4. Checks blood sugar 288 times a day - on FreeStyle Gema. She is making frequent adjustments in her insulin dose based on glucose levels.  Has low blood sugar: frequent - usually nocturnal - none severe since last visit.  Aspirin use: Yes. Statin use: Yes. ACE-I/ARB use: Yes. Changes in health since last visit: none. Last eye exam 2021.  She reports the duloxetine and tramadol are helping her feet but is having more back and leg pain.     She remains on T4 137mcg QD She is taking this correctly. She isn't taking any interfering meds concurrently. She hasn't noted any change in the size of her neck. She denies any compressive sxs. She denies any sxs of hypo- or hyperthyroidism at this time.    Subjective      Diabetic Complications:  Eyes: No  Kidneys: Yes - albuminuria  Feet: Yes - neuropathy pain  Heart: No    Diet and Exercise:  Meals per day: 3  Minutes of exercise per week: 0 mins.    Review of Systems:   Review of Systems   Constitutional:  Positive for fatigue.   Cardiovascular: Negative.    Gastrointestinal: Negative.    Endocrine: Negative.      The following portions of the patient's history were reviewed and updated as appropriate: allergies, current medications, past family history, past medical history, past social history, past surgical history, and problem list.    Objective     Visit Vitals  /78   Pulse 73   Ht 157.5 cm (62\")   Wt 67.1 kg (148 lb)   SpO2 99%   BMI 27.07 kg/mý       Physical Exam:  Physical Exam  Constitutional:       Appearance: Normal appearance.   Neurological:      Mental Status: " She is alert.       Labs:    HbA1c  Lab Results   Component Value Date    HGBA1C 8.8 08/30/2023       CMP  Lab Results   Component Value Date    GLUCOSE 148 (H) 05/03/2023    BUN 35 (H) 05/03/2023    CREATININE 2.05 (H) 05/03/2023    EGFRIFNONA 55 (L) 08/22/2017    BCR 17.1 05/03/2023    K 4.9 05/03/2023    CO2 25.1 05/03/2023    CALCIUM 9.5 05/03/2023    AST 30 05/03/2023    ALT 23 05/03/2023        Lipid Panel  Lab Results   Component Value Date    HDL 74 (H) 05/03/2023     (H) 05/03/2023    TRIG 95 05/03/2023        TSH  Lab Results   Component Value Date    TSH 8.430 (H) 05/03/2023    FREET4 1.03 08/22/2017        Hemoglobin A1C  Lab Results   Component Value Date    HGBA1C 8.8 08/30/2023        Microalbumin/Creatinine  Lab Results   Component Value Date    MALBCRERATIO 3,356.2 05/03/2023    MICROALBUR 205.4 05/03/2023           Assessment / Plan      Assessment & Plan:  Diagnoses and all orders for this visit:    1. Uncontrolled type 2 diabetes mellitus with hyperglycemia (Primary)  Assessment & Plan:  Diabetes is unchanged.   Continue current treatment regimen.  But be more aggressive with mealtime insulin.  Diabetes will be reassessed in 3 months.    FreeStyle Gema CGM was downloaded but there wasn't enough data to make an interpretation.    Orders:  -     Comprehensive Metabolic Panel; Future    2. DM (diabetes mellitus), type 2 with neurological complications  Assessment & Plan:  She is being evaluated for spinal cord stimulator.  I think this could be very helpful for her.    Orders:  -     POC Glucose, Blood  -     POC Glycosylated Hemoglobin (Hb A1C)    3. Primary hypertension  Assessment & Plan:  Hypertension is unchanged.  Continue current treatment regimen.  Blood pressure will be reassessed at the next regular appointment.      4. Hyperlipidemia, unspecified hyperlipidemia type  Assessment & Plan:  Continue statin.      5. Acquired hypothyroidism  Assessment & Plan:  Continue T4 tx.  Check  TSH.    Orders:  -     TSH; Future    6. Type 2 diabetes mellitus with hypoglycemia without coma, with long-term current use of insulin  Assessment & Plan:  Continue CGM.      Other orders  -     glucose blood test strip; Use as instructed 4x per day: ICD-10 E11.65; Z79.4  Dispense: 400 each; Refill: 3      Current Outpatient Medications   Medication Instructions    ascorbic acid (VITAMIN C) 500 mg, Oral, Daily    aspirin 81 mg, Oral, Daily    clopidogrel (PLAVIX) 75 mg, Oral, Daily, Resume in 1 week.    cyclobenzaprine (FLEXERIL) 10 mg, Oral, 3 Times Daily PRN    docusate sodium (COLACE) 100 mg, Oral, Daily PRN    DULoxetine (CYMBALTA) 60 mg, Oral, Daily    famotidine (PEPCID) 20 mg, Oral, 2 Times Daily    ferrous sulfate 325 mg, Oral, Daily With Breakfast    folic acid (FOLVITE) 500 mcg, Oral, Daily    furosemide (LASIX) 40 mg, Oral, Daily    glucose blood (FreeStyle Precision Geo Test) test strip 1 daily to use with the freestyle aleks device    glucose blood test strip Use as instructed 4x per day: ICD-10 E11.65; Z79.4    ibuprofen (ADVIL,MOTRIN) 600 mg, Oral, Every 6 Hours PRN    insulin aspart (NovoLOG FlexPen) 100 UNIT/ML solution pen-injector sc pen INJECT 10 UNITS WITH B'FAST AND LUNCH AND 12 UNITS WITH SUPPER    Insulin Pen Needle 31G X 5 MM misc 1 each, Does not apply, 4 Times Daily    Levemir FlexTouch 14 Units, Subcutaneous, 2 Times Daily    levothyroxine (SYNTHROID, LEVOTHROID) 137 mcg, Oral, Daily    losartan (COZAAR) 100 mg, Oral, Daily    polyethylene glycol (MIRALAX) 17 g, Oral, Daily PRN    QUEtiapine (SEROQUEL) 50 mg, Oral, Every Night at Bedtime    raloxifene (EVISTA) 60 mg, Oral, Daily    simvastatin (ZOCOR) 20 mg, Oral, Daily    traMADol (ULTRAM) 50 mg, Oral, 2 Times Daily      Return in about 3 months (around 11/30/2023) for Recheck with A1c, CMP, TSH.    Anmol Robertson MD   08/30/2023

## 2023-08-31 LAB
ALBUMIN SERPL-MCNC: 4 G/DL (ref 3.5–5.2)
ALBUMIN/GLOB SERPL: 1.3 G/DL
ALP SERPL-CCNC: 101 U/L (ref 39–117)
ALT SERPL W P-5'-P-CCNC: 6 U/L (ref 1–33)
ANION GAP SERPL CALCULATED.3IONS-SCNC: 13 MMOL/L (ref 5–15)
AST SERPL-CCNC: 16 U/L (ref 1–32)
BILIRUB SERPL-MCNC: 0.2 MG/DL (ref 0–1.2)
BUN SERPL-MCNC: 32 MG/DL (ref 8–23)
BUN/CREAT SERPL: 13.7 (ref 7–25)
CALCIUM SPEC-SCNC: 9.8 MG/DL (ref 8.6–10.5)
CHLORIDE SERPL-SCNC: 102 MMOL/L (ref 98–107)
CO2 SERPL-SCNC: 26 MMOL/L (ref 22–29)
CREAT SERPL-MCNC: 2.34 MG/DL (ref 0.57–1)
EGFRCR SERPLBLD CKD-EPI 2021: 20.8 ML/MIN/1.73
GLOBULIN UR ELPH-MCNC: 3.2 GM/DL
GLUCOSE SERPL-MCNC: 159 MG/DL (ref 65–99)
POTASSIUM SERPL-SCNC: 4.4 MMOL/L (ref 3.5–5.2)
PROT SERPL-MCNC: 7.2 G/DL (ref 6–8.5)
SODIUM SERPL-SCNC: 141 MMOL/L (ref 136–145)
TSH SERPL DL<=0.05 MIU/L-ACNC: 8.47 UIU/ML (ref 0.27–4.2)

## 2023-08-31 RX ORDER — LEVOTHYROXINE SODIUM 0.15 MG/1
150 TABLET ORAL DAILY
Qty: 90 TABLET | Refills: 3 | Status: SHIPPED | OUTPATIENT
Start: 2023-08-31

## 2023-10-02 ENCOUNTER — TELEPHONE (OUTPATIENT)
Dept: ENDOCRINOLOGY | Facility: CLINIC | Age: 78
End: 2023-10-02
Payer: MEDICARE

## 2023-10-02 NOTE — TELEPHONE ENCOUNTER
PT CALLED STATING SHE IS OUT OF AKILAH SENSORS. P STATED SHE GOT THEM THROUGH A MAIL ORDER SERVICE, BUT SHE IS UNSURE WHERE SHE WAS GETTING THEM. PT HAS NO OTHER WAY TO CHECK HER BS. SHE REQUESTED WE SEND IN A TEST KIT, STRIPS, AND LANCETS UNTIL WE GET THIS TAKEN CARE OF.

## 2023-10-04 RX ORDER — BLOOD-GLUCOSE METER
1 KIT MISCELLANEOUS SEE ADMIN INSTRUCTIONS
Qty: 1 EACH | Refills: 0 | Status: SHIPPED | OUTPATIENT
Start: 2023-10-04

## 2023-10-04 RX ORDER — LANCETS 33 GAUGE
1 EACH MISCELLANEOUS 3 TIMES DAILY
Qty: 300 EACH | Refills: 3 | Status: SHIPPED | OUTPATIENT
Start: 2023-10-04

## 2023-10-04 NOTE — TELEPHONE ENCOUNTER
Spoke with patient.  Rx's sent for testing supplies per protocol.  Spoke with [facility] DME and they state her Gema order is being processed and they have all of the needed documentation.

## 2023-10-18 ENCOUNTER — TELEPHONE (OUTPATIENT)
Dept: ENDOCRINOLOGY | Facility: CLINIC | Age: 78
End: 2023-10-18
Payer: MEDICARE

## 2023-10-18 NOTE — TELEPHONE ENCOUNTER
"Caller: Paulina Johnson \"JUNE\"    Relationship to patient: Self    Best call back number: [phone] - CAN BE REACHED ANYTIME    Patient is needing: PATIENT HAS AN APPOINTMENT 5/7/24, BUT WOULD LIKE A LAB ORDER SENT TO HER PCP TO GET LABS DONE IN NOVEMBER. IS THIS POSSIBLE?    ALSO NEEDS A CALL TO FOLLOW-UP ON HER AKILAH CGM -- SHE SAID THEY WERE TRYING TO GET A REPLACEMENT. THANKS!  "

## 2023-10-19 NOTE — TELEPHONE ENCOUNTER
Spoke with patient.  She states her Gema 14 day has stopped working.  She states that she would like to upgrade to Gema 2.  She uses [facility] [employer] [facility] [employer] is her company.  The phone number that she gave, [phone] and is not a working number.   New order sent to [facility].  She also states she does not needs labs in November and will discuss at her upcoming appointment.

## 2023-10-25 ENCOUNTER — DOCUMENTATION (OUTPATIENT)
Dept: ENDOCRINOLOGY | Facility: CLINIC | Age: 78
End: 2023-10-25

## 2023-10-25 ENCOUNTER — OFFICE VISIT (OUTPATIENT)
Dept: ENDOCRINOLOGY | Facility: CLINIC | Age: 78
End: 2023-10-25
Payer: MEDICARE

## 2023-10-25 VITALS
DIASTOLIC BLOOD PRESSURE: 74 MMHG | WEIGHT: 148 LBS | HEART RATE: 81 BPM | BODY MASS INDEX: 27.23 KG/M2 | SYSTOLIC BLOOD PRESSURE: 120 MMHG | OXYGEN SATURATION: 98 % | HEIGHT: 62 IN

## 2023-10-25 DIAGNOSIS — E78.5 HYPERLIPIDEMIA, UNSPECIFIED HYPERLIPIDEMIA TYPE: ICD-10-CM

## 2023-10-25 DIAGNOSIS — Z79.4 TYPE 2 DIABETES MELLITUS WITH HYPOGLYCEMIA WITHOUT COMA, WITH LONG-TERM CURRENT USE OF INSULIN: ICD-10-CM

## 2023-10-25 DIAGNOSIS — I10 PRIMARY HYPERTENSION: ICD-10-CM

## 2023-10-25 DIAGNOSIS — E11.49 DM (DIABETES MELLITUS), TYPE 2 WITH NEUROLOGICAL COMPLICATIONS: ICD-10-CM

## 2023-10-25 DIAGNOSIS — E03.9 ACQUIRED HYPOTHYROIDISM: ICD-10-CM

## 2023-10-25 DIAGNOSIS — E11.649 TYPE 2 DIABETES MELLITUS WITH HYPOGLYCEMIA WITHOUT COMA, WITH LONG-TERM CURRENT USE OF INSULIN: ICD-10-CM

## 2023-10-25 DIAGNOSIS — E11.65 UNCONTROLLED TYPE 2 DIABETES MELLITUS WITH HYPERGLYCEMIA: Primary | ICD-10-CM

## 2023-10-25 LAB
ALBUMIN SERPL-MCNC: 3.6 G/DL (ref 3.5–5.2)
ALBUMIN/GLOB SERPL: 1.2 G/DL
ALP SERPL-CCNC: 72 U/L (ref 39–117)
ALT SERPL W P-5'-P-CCNC: 9 U/L (ref 1–33)
ANION GAP SERPL CALCULATED.3IONS-SCNC: 10.5 MMOL/L (ref 5–15)
AST SERPL-CCNC: 17 U/L (ref 1–32)
BILIRUB SERPL-MCNC: <0.2 MG/DL (ref 0–1.2)
BUN SERPL-MCNC: 36 MG/DL (ref 8–23)
BUN/CREAT SERPL: 13.5 (ref 7–25)
CALCIUM SPEC-SCNC: 9.3 MG/DL (ref 8.6–10.5)
CHLORIDE SERPL-SCNC: 102 MMOL/L (ref 98–107)
CO2 SERPL-SCNC: 25.5 MMOL/L (ref 22–29)
CREAT SERPL-MCNC: 2.67 MG/DL (ref 0.57–1)
EGFRCR SERPLBLD CKD-EPI 2021: 17.7 ML/MIN/1.73
EXPIRATION DATE: ABNORMAL
GLOBULIN UR ELPH-MCNC: 3 GM/DL
GLUCOSE BLDC GLUCOMTR-MCNC: 197 MG/DL (ref 70–130)
GLUCOSE SERPL-MCNC: 182 MG/DL (ref 65–99)
HBA1C MFR BLD: 8.8 % (ref 4.8–5.6)
Lab: ABNORMAL
POTASSIUM SERPL-SCNC: 4.5 MMOL/L (ref 3.5–5.2)
PROT SERPL-MCNC: 6.6 G/DL (ref 6–8.5)
SODIUM SERPL-SCNC: 138 MMOL/L (ref 136–145)
TSH SERPL DL<=0.05 MIU/L-ACNC: 0.77 UIU/ML (ref 0.27–4.2)

## 2023-10-25 PROCEDURE — 1160F RVW MEDS BY RX/DR IN RCRD: CPT | Performed by: INTERNAL MEDICINE

## 2023-10-25 PROCEDURE — 80053 COMPREHEN METABOLIC PANEL: CPT | Performed by: INTERNAL MEDICINE

## 2023-10-25 PROCEDURE — 83036 HEMOGLOBIN GLYCOSYLATED A1C: CPT | Performed by: INTERNAL MEDICINE

## 2023-10-25 PROCEDURE — 99214 OFFICE O/P EST MOD 30 MIN: CPT | Performed by: INTERNAL MEDICINE

## 2023-10-25 PROCEDURE — 84443 ASSAY THYROID STIM HORMONE: CPT | Performed by: INTERNAL MEDICINE

## 2023-10-25 PROCEDURE — 3078F DIAST BP <80 MM HG: CPT | Performed by: INTERNAL MEDICINE

## 2023-10-25 PROCEDURE — 1159F MED LIST DOCD IN RCRD: CPT | Performed by: INTERNAL MEDICINE

## 2023-10-25 PROCEDURE — 82947 ASSAY GLUCOSE BLOOD QUANT: CPT | Performed by: INTERNAL MEDICINE

## 2023-10-25 PROCEDURE — 3074F SYST BP LT 130 MM HG: CPT | Performed by: INTERNAL MEDICINE

## 2023-10-25 RX ORDER — DULOXETIN HYDROCHLORIDE 60 MG/1
60 CAPSULE, DELAYED RELEASE ORAL DAILY
Qty: 90 CAPSULE | Refills: 3 | Status: SHIPPED | OUTPATIENT
Start: 2023-10-25

## 2023-10-25 RX ORDER — SIMVASTATIN 20 MG
20 TABLET ORAL DAILY
Qty: 90 TABLET | Refills: 3 | Status: SHIPPED | OUTPATIENT
Start: 2023-10-25

## 2023-10-25 RX ORDER — TRAMADOL HYDROCHLORIDE 50 MG/1
50 TABLET ORAL 2 TIMES DAILY
Qty: 180 TABLET | Refills: 1 | Status: SHIPPED | OUTPATIENT
Start: 2023-10-25

## 2023-10-25 RX ORDER — LOSARTAN POTASSIUM 100 MG/1
100 TABLET ORAL DAILY
Qty: 90 TABLET | Refills: 3 | Status: SHIPPED | OUTPATIENT
Start: 2023-10-25

## 2023-10-25 RX ORDER — INSULIN ASPART 100 [IU]/ML
INJECTION, SOLUTION INTRAVENOUS; SUBCUTANEOUS
Qty: 45 ML | Refills: 3 | Status: SHIPPED | OUTPATIENT
Start: 2023-10-25

## 2023-10-25 RX ORDER — INSULIN DETEMIR 100 [IU]/ML
14 INJECTION, SOLUTION SUBCUTANEOUS 2 TIMES DAILY
Qty: 27 ML | Refills: 3 | Status: SHIPPED | OUTPATIENT
Start: 2023-10-25

## 2023-10-25 RX ORDER — LEVOTHYROXINE SODIUM 0.15 MG/1
150 TABLET ORAL DAILY
Qty: 90 TABLET | Refills: 3 | Status: SHIPPED | OUTPATIENT
Start: 2023-10-25

## 2023-10-25 NOTE — ASSESSMENT & PLAN NOTE
Diabetes is unchanged.   Continue current treatment regimen.  But titrate up insulin.  Diabetes will be reassessed in 3 months.

## 2023-10-25 NOTE — PROGRESS NOTES
"     Office Note      Date: 10/25/2023  Patient Name: Paulina Johnson  MRN: [id number]  : 1945    Chief Complaint   Patient presents with    Diabetes       History of Present Illness:   Paulina Johnson is a 78 y.o. female who presents for Diabetes type 2. Diagnosed in: . Treated in past with oral agents. Current treatments: basal bolus insulin. Number of insulin shots per day: >4. Checks blood sugar 288 times a day - on FreeStyle Gema 14 day. She is making frequent adjustments in her insulin dose based on glucose levels.  Has low blood sugar: frequent - usually nocturnal - none severe since last visit.  Aspirin use: Yes. Statin use: Yes. ACE-I/ARB use: Yes. Changes in health since last visit: admission for hypertensive urgency. Last eye exam 2022.  She reports the duloxetine and tramadol are helping her feet.     She remains on T4 150mcg QD She is taking this correctly. She isn't taking any interfering meds concurrently. She hasn't noted any change in the size of her neck. She denies any compressive sxs. She denies any sxs of hypo- or hyperthyroidism at this time.    Subjective      Diabetic Complications:  Eyes: No  Kidneys: Yes - albuminuria  Feet: Yes - neuropathy pain  Heart: No    Diet and Exercise:  Meals per day: 3  Minutes of exercise per week: 0 mins.    Review of Systems:   Review of Systems   Constitutional:  Positive for fatigue.   Cardiovascular: Negative.    Gastrointestinal: Negative.    Endocrine: Negative.        The following portions of the patient's history were reviewed and updated as appropriate: allergies, current medications, past family history, past medical history, past social history, past surgical history, and problem list.    Objective     Visit Vitals  /74   Pulse 81   Ht 157.5 cm (62\")   Wt 67.1 kg (148 lb)   SpO2 98%   BMI 27.07 kg/m²       Physical Exam:  Physical Exam  Constitutional:       Appearance: Normal appearance.   Neurological:      Mental " Status: She is alert.         Labs:    HbA1c  Lab Results   Component Value Date    HGBA1C 8.8 08/30/2023       CMP  Lab Results   Component Value Date    GLUCOSE 159 (H) 08/30/2023    BUN 32 (H) 08/30/2023    CREATININE 2.34 (H) 08/30/2023    EGFRIFNONA 55 (L) 08/22/2017    BCR 13.7 08/30/2023    K 4.4 08/30/2023    CO2 26.0 08/30/2023    CALCIUM 9.8 08/30/2023    AST 16 08/30/2023    ALT 6 08/30/2023        Lipid Panel  Lab Results   Component Value Date    HDL 74 (H) 05/03/2023     (H) 05/03/2023    TRIG 95 05/03/2023        TSH  Lab Results   Component Value Date    TSH 8.470 (H) 08/30/2023    FREET4 1.03 08/22/2017        Hemoglobin A1C  Lab Results   Component Value Date    HGBA1C 8.8 08/30/2023        Microalbumin/Creatinine  Lab Results   Component Value Date    MALBCRERATIO 3,356.2 05/03/2023    MICROALBUR 205.4 05/03/2023           Assessment / Plan      Assessment & Plan:  Diagnoses and all orders for this visit:    1. Uncontrolled type 2 diabetes mellitus with hyperglycemia (Primary)  Assessment & Plan:  Diabetes is unchanged.   Continue current treatment regimen.  But titrate up insulin.  Diabetes will be reassessed in 3 months.    Orders:  -     POC Glucose, Blood  -     Cancel: POC Glycosylated Hemoglobin (Hb A1C)  -     Comprehensive Metabolic Panel; Future  -     Hemoglobin A1c; Future    2. Type 2 diabetes mellitus with hypoglycemia without coma, with long-term current use of insulin  Assessment & Plan:  We discussed upgrade CGM from FreeStyle Gema 14 to Gema 3.      3. DM (diabetes mellitus), type 2 with neurological complications  Assessment & Plan:  Continue tramadol and duloxetine.    Orders:  -     traMADol (ULTRAM) 50 MG tablet; Take 1 tablet by mouth 2 (Two) Times a Day.  Dispense: 180 tablet; Refill: 1    4. Primary hypertension  Assessment & Plan:  Hypertension is unchanged.  Continue current treatment regimen.  Blood pressure will be reassessed at the next regular  appointment.      5. Hyperlipidemia, unspecified hyperlipidemia type  Assessment & Plan:  Continue statin.      6. Acquired hypothyroidism  Assessment & Plan:  Continue T4 tx.  Check TSH.    Orders:  -     TSH; Future    Other orders  -     DULoxetine (CYMBALTA) 60 MG capsule; Take 1 capsule by mouth Daily.  Dispense: 90 capsule; Refill: 3  -     insulin aspart (NovoLOG FlexPen) 100 UNIT/ML solution pen-injector sc pen; INJECT 10 UNITS WITH B'FAST AND LUNCH AND 12 UNITS WITH SUPPER  Dispense: 45 mL; Refill: 3  -     insulin detemir (Levemir FlexTouch) 100 UNIT/ML injection; Inject 14 Units under the skin into the appropriate area as directed 2 (Two) Times a Day.  Dispense: 27 mL; Refill: 3  -     Insulin Pen Needle 31G X 5 MM misc; Use 1 each 4 (Four) Times a Day.  Dispense: 400 each; Refill: 3  -     levothyroxine (SYNTHROID, LEVOTHROID) 150 MCG tablet; Take 1 tablet by mouth Daily.  Dispense: 90 tablet; Refill: 3  -     losartan (COZAAR) 100 MG tablet; Take 1 tablet by mouth Daily.  Dispense: 90 tablet; Refill: 3  -     simvastatin (ZOCOR) 20 MG tablet; Take 1 tablet by mouth Daily.  Dispense: 90 tablet; Refill: 3      Current Outpatient Medications   Medication Instructions    ascorbic acid (VITAMIN C) 500 mg, Oral, Daily    aspirin 81 mg, Oral, Daily    clopidogrel (PLAVIX) 75 mg, Oral, Daily, Resume in 1 week.    cyclobenzaprine (FLEXERIL) 10 mg, Oral, 3 Times Daily PRN    docusate sodium (COLACE) 100 mg, Oral, Daily PRN    DULoxetine (CYMBALTA) 60 mg, Oral, Daily    famotidine (PEPCID) 20 mg, Oral, 2 Times Daily    ferrous sulfate 325 mg, Oral, Daily With Breakfast    folic acid (FOLVITE) 500 mcg, Oral, Daily    furosemide (LASIX) 40 mg, Oral, Daily    glucose blood (FreeStyle Precision Geo Test) test strip 1 daily to use with the freestyle aleks device    glucose blood test strip Use as instructed 3x per day: ICD-10 E11.65; Z79.4   Dispense insurance preference.    glucose monitor monitoring kit 1 each, Does  not apply, See Admin Instructions, Use to test blood sugar 3 times daily.  Dx E11.65  Dispense insurance preference.    ibuprofen (ADVIL,MOTRIN) 600 mg, Oral, Every 6 Hours PRN    insulin aspart (NovoLOG FlexPen) 100 UNIT/ML solution pen-injector sc pen INJECT 10 UNITS WITH B'FAST AND LUNCH AND 12 UNITS WITH SUPPER    Insulin Pen Needle 31G X 5 MM misc 1 each, Does not apply, 4 Times Daily    Lancets 33G misc 1 each, Does not apply, 3 times daily, Use to check blood sugar 3 times daily.  Dx E11.65.  Dispense insurance preference.    Levemir FlexTouch 14 Units, Subcutaneous, 2 Times Daily    levothyroxine (SYNTHROID, LEVOTHROID) 150 mcg, Oral, Daily    losartan (COZAAR) 100 mg, Oral, Daily    polyethylene glycol (MIRALAX) 17 g, Oral, Daily PRN    QUEtiapine (SEROQUEL) 50 mg, Oral, Every Night at Bedtime    raloxifene (EVISTA) 60 mg, Oral, Daily    simvastatin (ZOCOR) 20 mg, Oral, Daily    traMADol (ULTRAM) 50 mg, Oral, 2 Times Daily      Return in about 3 months (around 1/25/2024) for Recheck with A1c, CMP, lipid, TSH, microalbumin, foot exam, controlled substance agreement.    Anmol Robertson MD   10/25/2023

## 2023-10-25 NOTE — PROGRESS NOTES
Assisted with [employer] set up, pt connected for data sharing using [employer] isa.

## 2023-10-26 ENCOUNTER — TELEPHONE (OUTPATIENT)
Dept: ENDOCRINOLOGY | Facility: CLINIC | Age: 78
End: 2023-10-26
Payer: MEDICARE

## 2023-10-26 NOTE — TELEPHONE ENCOUNTER
Spoke to pt and [facility].  They will fax a new prescription for aleks 2 to clinic fax

## 2023-10-26 NOTE — TELEPHONE ENCOUNTER
"    Caller: Paulina Johnson \"JUNE\"    Relationship: Self    Best call back number: [phone]     Requested Prescriptions:      FREESTYLE AKILAH 2 SENSORS    Pharmacy where request should be sent:  [facility]  PHONE # [phone]    Last office visit with prescribing clinician: 10/25/2023     Next office visit with prescribing clinician: 5/14/2024     Additional details provided by patient:  PATIENT STATES SHE IS COMPLETELY OUT OF FREESTYLE AKILAH 2 SENSORS    Does the patient have less than a 3 day supply:  [x] Yes  [] No    Would you like a call back once the refill request has been completed: [x] Yes [] No    If the office needs to give you a call back, can they leave a voicemail: [] Yes [x] No    David Hilliard Rep   10/26/23 11:18 EDT       "

## 2023-11-09 NOTE — TELEPHONE ENCOUNTER
"    Caller: Paulina Johnson \"JUNE\"    Relationship: Self    Best call back number: [phone] (home)       Requested Prescriptions:   Requested Prescriptions     Pending Prescriptions Disp Refills    Continuous Blood Gluc Sensor (FreeStyle Gema 2 Sensor) misc 2 each 3     Sig: Use 1 each Every 14 (Fourteen) Days.        Pharmacy where request should be sent:  [facility] [phone]     Last office visit with prescribing clinician: 10/25/2023   Last telemedicine visit with prescribing clinician: Visit date not found   Next office visit with prescribing clinician: 5/14/2024     Additional details provided by patient:     Does the patient have less than a 3 day supply:  [x] Yes  [] No    Would you like a call back once the refill request has been completed: [x] Yes [] No    If the office needs to give you a call back, can they leave a voicemail: [] Yes [] No    David Lyle Rep   11/09/23 15:45 EST       "

## 2023-11-16 LAB
ALBUMIN/CREATININE RATIO, URINE: 4451
HBA1C MFR BLD: 9.3 %

## 2024-02-09 NOTE — TELEPHONE ENCOUNTER
PAT lab called to give us some abnormal results. Paulina Johnson is schedules for sx on 7/5/17  A1C is 9.6  Hemoglobin is 9.7   Calm/Appropriate

## 2024-04-08 ENCOUNTER — TELEPHONE (OUTPATIENT)
Dept: ENDOCRINOLOGY | Facility: CLINIC | Age: 79
End: 2024-04-08
Payer: MEDICARE

## 2024-04-08 RX ORDER — INSULIN DEGLUDEC 100 U/ML
14 INJECTION, SOLUTION SUBCUTANEOUS 2 TIMES DAILY
Qty: 27 ML | Refills: 0 | Status: SHIPPED | OUTPATIENT
Start: 2024-04-08

## 2024-04-08 NOTE — TELEPHONE ENCOUNTER
Pt called states Levemir flextouch 100 unit is no longer covered under insurance. Pt would like to be notified of changes. Please send prescription to [facility] in [place], [place]

## 2024-04-19 ENCOUNTER — TELEPHONE (OUTPATIENT)
Dept: ENDOCRINOLOGY | Facility: CLINIC | Age: 79
End: 2024-04-19

## 2024-04-19 NOTE — TELEPHONE ENCOUNTER
Pt's primary care calling to ask if Dr. Robertson could give this doctor a call to speak about patient:    Dr Thompson  [phone]

## 2024-05-14 ENCOUNTER — OFFICE VISIT (OUTPATIENT)
Dept: ENDOCRINOLOGY | Facility: CLINIC | Age: 79
End: 2024-05-14
Payer: MEDICARE

## 2024-05-14 ENCOUNTER — DOCUMENTATION (OUTPATIENT)
Dept: ENDOCRINOLOGY | Facility: CLINIC | Age: 79
End: 2024-05-14

## 2024-05-14 VITALS
OXYGEN SATURATION: 99 % | BODY MASS INDEX: 25.95 KG/M2 | WEIGHT: 141 LBS | HEIGHT: 62 IN | SYSTOLIC BLOOD PRESSURE: 130 MMHG | HEART RATE: 75 BPM | DIASTOLIC BLOOD PRESSURE: 78 MMHG

## 2024-05-14 DIAGNOSIS — E11.649 TYPE 2 DIABETES MELLITUS WITH HYPOGLYCEMIA WITHOUT COMA, WITH LONG-TERM CURRENT USE OF INSULIN: ICD-10-CM

## 2024-05-14 DIAGNOSIS — E11.65 UNCONTROLLED TYPE 2 DIABETES MELLITUS WITH HYPERGLYCEMIA: Primary | ICD-10-CM

## 2024-05-14 DIAGNOSIS — E03.9 ACQUIRED HYPOTHYROIDISM: ICD-10-CM

## 2024-05-14 DIAGNOSIS — N18.4 CKD (CHRONIC KIDNEY DISEASE) STAGE 4, GFR 15-29 ML/MIN: ICD-10-CM

## 2024-05-14 DIAGNOSIS — I10 PRIMARY HYPERTENSION: ICD-10-CM

## 2024-05-14 DIAGNOSIS — Z79.4 TYPE 2 DIABETES MELLITUS WITH HYPOGLYCEMIA WITHOUT COMA, WITH LONG-TERM CURRENT USE OF INSULIN: ICD-10-CM

## 2024-05-14 DIAGNOSIS — E11.49 DM (DIABETES MELLITUS), TYPE 2 WITH NEUROLOGICAL COMPLICATIONS: ICD-10-CM

## 2024-05-14 DIAGNOSIS — E78.5 HYPERLIPIDEMIA, UNSPECIFIED HYPERLIPIDEMIA TYPE: ICD-10-CM

## 2024-05-14 LAB
EXPIRATION DATE: NORMAL
GLUCOSE BLDC GLUCOMTR-MCNC: 110 MG/DL (ref 70–130)
HBA1C MFR BLD: 8.2 % (ref 4.8–5.6)
Lab: NORMAL

## 2024-05-14 PROCEDURE — 3078F DIAST BP <80 MM HG: CPT | Performed by: INTERNAL MEDICINE

## 2024-05-14 PROCEDURE — 3075F SYST BP GE 130 - 139MM HG: CPT | Performed by: INTERNAL MEDICINE

## 2024-05-14 PROCEDURE — 99214 OFFICE O/P EST MOD 30 MIN: CPT | Performed by: INTERNAL MEDICINE

## 2024-05-14 PROCEDURE — 82947 ASSAY GLUCOSE BLOOD QUANT: CPT | Performed by: INTERNAL MEDICINE

## 2024-05-14 RX ORDER — NIFEDIPINE 60 MG/1
60 TABLET, EXTENDED RELEASE ORAL DAILY
COMMUNITY

## 2024-05-14 RX ORDER — INSULIN DEGLUDEC 100 U/ML
18 INJECTION, SOLUTION SUBCUTANEOUS DAILY
Qty: 15 ML | Refills: 3 | Status: SHIPPED | OUTPATIENT
Start: 2024-05-14

## 2024-05-14 RX ORDER — CARVEDILOL 25 MG/1
25 TABLET ORAL
COMMUNITY
Start: 2024-03-08

## 2024-05-14 RX ORDER — HYDRALAZINE HYDROCHLORIDE 25 MG/1
25 TABLET, FILM COATED ORAL 3 TIMES DAILY
COMMUNITY
Start: 2024-03-08

## 2024-05-14 NOTE — ASSESSMENT & PLAN NOTE
Diabetes is  fluctuating .  Recent A1c improved to 8.2%.  Having some hypoglycemia, especially at night.  Decrease basal insulin.  Change to once daily at lower dose.  Diabetes will be reassessed in 3 months.    FreeStyle Gema 2 CGM was downloaded today.  Data was reviewed from 5/1/24 to 5/14/24.  This showed some nocturnal hypoglycemia.  Some postprandial spikes.  Time in range was 54%.  Low 6%.

## 2024-05-14 NOTE — PROGRESS NOTES
"     Office Note      Date: 2024  Patient Name: Paulina Johnson  MRN: [id number]  : 1945    Chief Complaint   Patient presents with    Diabetes       History of Present Illness:   Paulina Johnson is a 78 y.o. female who presents for Diabetes type 2. Diagnosed in: . Treated in past with oral agents. Current treatments: basal bolus insulin. Number of insulin shots per day: >4. Checks blood sugar 288 times a day - on FreeStyle Gema 14 day. She is making frequent adjustments in her insulin dose based on glucose levels.  Has low blood sugar: frequent - usually nocturnal.  Aspirin use: Yes. Statin use: Yes. ACE-I/ARB use: Yes. Changes in health since last visit: admission for hyperkalemia and worsening renal function - may need dialysis. Last eye exam 2022.  She reports the duloxetine and tramadol are helping her feet.     She remains on T4 150mcg QD She is taking this correctly. She isn't taking any interfering meds concurrently. She hasn't noted any change in the size of her neck. She denies any compressive sxs. She denies any sxs of hypo- or hyperthyroidism at this time.    Subjective      Diabetic Complications:  Eyes: No  Kidneys: Yes - CKD and albuminuria  Feet: Yes - neuropathy pain  Heart: No    Diet and Exercise:  Meals per day: 3  Minutes of exercise per week: 0 mins.    Review of Systems:   Review of Systems   Constitutional:  Positive for fatigue.   Cardiovascular: Negative.    Gastrointestinal: Negative.    Endocrine: Negative.        The following portions of the patient's history were reviewed and updated as appropriate: allergies, current medications, past family history, past medical history, past social history, past surgical history, and problem list.    Objective     Visit Vitals  /78 (BP Location: Left arm, Patient Position: Sitting, Cuff Size: Adult)   Pulse 75   Ht 157.5 cm (62\")   Wt 64 kg (141 lb)   SpO2 99%   BMI 25.79 kg/m²       Physical Exam:  Physical " Exam  Constitutional:       Appearance: Normal appearance.   Neurological:      Mental Status: She is alert.         Labs:    HbA1c  Lab Results   Component Value Date    HGBA1C 8.20 (A) 03/14/2024       CMP  Lab Results   Component Value Date    GLUCOSE 182 (H) 10/25/2023    BUN 36 (H) 10/25/2023    CREATININE 2.67 (H) 10/25/2023    EGFRIFNONA 55 (L) 08/22/2017    BCR 13.5 10/25/2023    K 4.5 10/25/2023    CO2 25.5 10/25/2023    CALCIUM 9.3 10/25/2023    AST 17 10/25/2023    ALT 9 10/25/2023        Lipid Panel  Lab Results   Component Value Date    HDL 74 (H) 05/03/2023     (H) 05/03/2023    TRIG 95 05/03/2023        TSH  Lab Results   Component Value Date    TSH 0.769 10/25/2023    FREET4 1.03 08/22/2017        Hemoglobin A1C  Lab Results   Component Value Date    HGBA1C 8.20 (A) 03/14/2024        Microalbumin/Creatinine  Lab Results   Component Value Date    MALBCRERATIO 3,356.2 05/03/2023    MICROALBUR 205.4 05/03/2023           Assessment / Plan      Assessment & Plan:  Diagnoses and all orders for this visit:    1. Uncontrolled type 2 diabetes mellitus with hyperglycemia (Primary)  Assessment & Plan:  Diabetes is  fluctuating .  Recent A1c improved to 8.2%.  Having some hypoglycemia, especially at night.  Decrease basal insulin.  Change to once daily at lower dose.  Diabetes will be reassessed in 3 months.    FreeStyle Gema 2 CGM was downloaded today.  Data was reviewed from 5/1/24 to 5/14/24.  This showed some nocturnal hypoglycemia.  Some postprandial spikes.  Time in range was 54%.  Low 6%.    Orders:  -     POC Glucose, Blood    2. Type 2 diabetes mellitus with hypoglycemia without coma, with long-term current use of insulin  Assessment & Plan:  Continue CGM.  Will send Rx for glucagon.      3. DM (diabetes mellitus), type 2 with neurological complications  Assessment & Plan:  Continue tramadol.  Controlled substance agreement was signed today.      4. Primary hypertension  Assessment &  Plan:  Hypertension is stable and controlled  Continue current treatment regimen.  Blood pressure will be reassessed in 6 months.      5. Hyperlipidemia, unspecified hyperlipidemia type  Assessment & Plan:  Continue statin.      6. Acquired hypothyroidism  Assessment & Plan:  Continue current T4 dose.    Recent TSH improved.      7. CKD (chronic kidney disease) stage 4, GFR 15-29 ml/min  Assessment & Plan:  Her PCP has made referral to nephrologist.      Other orders  -     Glucagon 1 MG/0.2ML solution auto-injector; Inject 1 mg under the skin into the appropriate area as directed As Needed (severe hypoglycemia).  Dispense: 0.2 mL; Refill: 3  -     insulin degludec (Tresiba FlexTouch) 100 UNIT/ML solution pen-injector injection; Inject 18 Units under the skin into the appropriate area as directed Daily.  Dispense: 15 mL; Refill: 3      Current Outpatient Medications   Medication Instructions    ascorbic acid (VITAMIN C) 500 mg, Oral, Daily    aspirin 81 mg, Oral, Daily    carvedilol (COREG) 25 mg, Oral    clopidogrel (PLAVIX) 75 mg, Oral, Daily, Resume in 1 week.    Continuous Blood Gluc Sensor (FreeStyle Gema 2 Sensor) misc 1 each, Does not apply, Every 14 Days    cyclobenzaprine (FLEXERIL) 10 mg, Oral, 3 Times Daily PRN    docusate sodium (COLACE) 100 mg, Oral, Daily PRN    DULoxetine (CYMBALTA) 60 mg, Oral, Daily    famotidine (PEPCID) 20 mg, Oral, 2 Times Daily    ferrous sulfate 325 mg, Oral, Daily With Breakfast    folic acid (FOLVITE) 500 mcg, Oral, Daily    furosemide (LASIX) 40 mg, Oral, Daily    Glucagon 1 mg, Subcutaneous, As Needed    glucose blood (FreeStyle Precision Geo Test) test strip 1 daily to use with the freestyle gema device    glucose blood test strip Use as instructed 3x per day: ICD-10 E11.65; Z79.4   Dispense insurance preference.    glucose monitor monitoring kit 1 each, Does not apply, See Admin Instructions, Use to test blood sugar 3 times daily.  Dx E11.65  Dispense insurance  preference.    hydrALAZINE (APRESOLINE) 25 mg, Oral, 3 Times Daily    ibuprofen (ADVIL,MOTRIN) 600 mg, Oral, Every 6 Hours PRN    insulin aspart (NovoLOG FlexPen) 100 UNIT/ML solution pen-injector sc pen INJECT 10 UNITS WITH B'FAST AND LUNCH AND 12 UNITS WITH SUPPER    Insulin Pen Needle 31G X 5 MM misc 1 each, Does not apply, 4 Times Daily    Lancets 33G misc 1 each, Does not apply, 3 times daily, Use to check blood sugar 3 times daily.  Dx E11.65.  Dispense insurance preference.    levothyroxine (SYNTHROID, LEVOTHROID) 150 mcg, Oral, Daily    losartan (COZAAR) 100 mg, Oral, Daily    NIFEdipine XL (PROCARDIA XL) 60 mg, Oral, Daily    polyethylene glycol (MIRALAX) 17 g, Oral, Daily PRN    QUEtiapine (SEROQUEL) 50 mg, Oral, Every Night at Bedtime    raloxifene (EVISTA) 60 mg, Oral, Daily    Sertraline HCl (ZOLOFT PO) 40 mg daily    simvastatin (ZOCOR) 20 mg, Oral, Daily    traMADol (ULTRAM) 50 mg, Oral, 2 Times Daily    Tresiba FlexTouch 18 Units, Subcutaneous, Daily      Return in about 3 months (around 8/14/2024) for Recheck with A1c, TSH.    Electronically signed by: Anmol Robertson MD  05/14/2024

## 2024-08-27 ENCOUNTER — TELEPHONE (OUTPATIENT)
Dept: ENDOCRINOLOGY | Facility: CLINIC | Age: 79
End: 2024-08-27
Payer: MEDICARE

## 2024-08-27 ENCOUNTER — OFFICE VISIT (OUTPATIENT)
Dept: ENDOCRINOLOGY | Facility: CLINIC | Age: 79
End: 2024-08-27
Payer: MEDICARE

## 2024-08-27 VITALS
DIASTOLIC BLOOD PRESSURE: 74 MMHG | WEIGHT: 144 LBS | HEIGHT: 62 IN | BODY MASS INDEX: 26.5 KG/M2 | HEART RATE: 78 BPM | OXYGEN SATURATION: 98 % | SYSTOLIC BLOOD PRESSURE: 122 MMHG

## 2024-08-27 DIAGNOSIS — E03.9 ACQUIRED HYPOTHYROIDISM: ICD-10-CM

## 2024-08-27 DIAGNOSIS — E11.65 UNCONTROLLED TYPE 2 DIABETES MELLITUS WITH HYPERGLYCEMIA: Primary | ICD-10-CM

## 2024-08-27 LAB
EXPIRATION DATE: ABNORMAL
EXPIRATION DATE: ABNORMAL
GLUCOSE BLDC GLUCOMTR-MCNC: 450 MG/DL (ref 70–130)
HBA1C MFR BLD: 11.2 % (ref 4.5–5.7)
Lab: ABNORMAL
Lab: ABNORMAL

## 2024-08-27 PROCEDURE — 3074F SYST BP LT 130 MM HG: CPT | Performed by: PHYSICIAN ASSISTANT

## 2024-08-27 PROCEDURE — 3078F DIAST BP <80 MM HG: CPT | Performed by: PHYSICIAN ASSISTANT

## 2024-08-27 PROCEDURE — 95251 CONT GLUC MNTR ANALYSIS I&R: CPT | Performed by: PHYSICIAN ASSISTANT

## 2024-08-27 PROCEDURE — 3046F HEMOGLOBIN A1C LEVEL >9.0%: CPT | Performed by: PHYSICIAN ASSISTANT

## 2024-08-27 PROCEDURE — 99214 OFFICE O/P EST MOD 30 MIN: CPT | Performed by: PHYSICIAN ASSISTANT

## 2024-08-27 PROCEDURE — 83036 HEMOGLOBIN GLYCOSYLATED A1C: CPT | Performed by: PHYSICIAN ASSISTANT

## 2024-08-27 RX ORDER — INSULIN ASPART 100 [IU]/ML
INJECTION, SOLUTION INTRAVENOUS; SUBCUTANEOUS
Qty: 45 ML | Refills: 3 | Status: SHIPPED | OUTPATIENT
Start: 2024-08-27

## 2024-08-27 RX ORDER — INSULIN DEGLUDEC 100 U/ML
22 INJECTION, SOLUTION SUBCUTANEOUS EVERY MORNING
Qty: 15 ML | Refills: 3 | Status: SHIPPED | OUTPATIENT
Start: 2024-08-27

## 2024-08-27 RX ORDER — CARVEDILOL 6.25 MG/1
1 TABLET ORAL EVERY 12 HOURS SCHEDULED
COMMUNITY
Start: 2024-08-12

## 2024-08-27 RX ORDER — NIFEDIPINE 30 MG
1 TABLET, EXTENDED RELEASE ORAL DAILY
COMMUNITY
Start: 2024-08-12

## 2024-08-27 NOTE — PATIENT INSTRUCTIONS
In the morning 15 minutes before breakfast take: Tresiba 22 units and Humalog 10 units    15 minutes before dinner take: Humalog 10 units

## 2024-08-27 NOTE — TELEPHONE ENCOUNTER
Paged by pt due to high glucose.  She was in the office earlier today for visit.  Her glucose was high at that time.  She took 22u of tresiba and 12u of humalog upon leaving the office and when eating lunch about 5.5 hours ago.  Her glucose is still high.  She is getting ready to eat dinner.  I instructed her to take an extra 10u of humalog (20u total) with dinner tonight.  Then continue with plan as per office note.

## 2024-08-27 NOTE — PROGRESS NOTES
Office Note      Date: 2024  Patient Name: Paulina Johnson  MRN: [id number]  : 1945    Chief Complaint   Patient presents with    Diabetes       History of Present Illness:   Paulina Johnson is a 78 y.o. female who presents for Diabetes type 2.   Diagnosed:   Current RX: Tresiba 14 units BID, Humalog 12 units BID    Bg checks are done: continuously with aleks  Hypoglycemia :occasionally    Pt has appointment tomorrow with Nephrologist. Has not needed dialysis yet.  Continuing to follow closely    She is often missing doses of insulin due to fear of hypoglycemia.    Last A1c:  Hemoglobin A1C   Date Value Ref Range Status   2024 11.2 (A) 4.5 - 5.7 % Final   2024 8.20 (A) 4.80 - 5.60 % Final   2023 9.3  Final     The last 2 weeks of CGM data are reviewed.  3 % are low  27 % are in range  14 % are 180-250  56 % are >250  GMI: 9.8  The glycemic pattern shows: wide glucose variability; overnight hyperglycemia with lows in the afternoon    Changes in health since last visit: none.     DM Health Maintenance:  Ophtho: scheduled at the end of September  Monofilament / Foot exam: due  Lipids/Statin: taking a statin with last FLP showing LDL 5/3/13 113  MRATINE: 23  TSH: ordered  Aspirin: taking  ACE/ARB: losartan     Subjective     Diabetic Complications:  Eyes: No  Kidneys: Yes - CKD and albuminuria  Feet: Yes - neuropathy pain  Heart: No         Review of Systems:   Review of Systems   Constitutional:  Negative for activity change, appetite change and fatigue.   Respiratory:  Negative for chest tightness and shortness of breath.    Gastrointestinal:  Negative for abdominal pain.   Musculoskeletal:  Negative for myalgias.   Neurological:  Negative for numbness.   Psychiatric/Behavioral:  The patient is not nervous/anxious.        The following portions of the patient's history were reviewed and updated as appropriate: allergies, current medications, past family history, past  "medical history, past social history, past surgical history, and problem list.    Objective     Visit Vitals  /74   Pulse 78   Ht 157.5 cm (62\")   Wt 65.3 kg (144 lb)   SpO2 98%   BMI 26.34 kg/m²           Physical Exam:  Physical Exam  Vitals and nursing note reviewed.   Constitutional:       Appearance: She is well-developed.   HENT:      Head: Normocephalic and atraumatic.   Eyes:      Conjunctiva/sclera: Conjunctivae normal.   Cardiovascular:      Rate and Rhythm: Normal rate and regular rhythm.   Pulmonary:      Effort: Pulmonary effort is normal.      Breath sounds: Normal breath sounds.   Musculoskeletal:         General: Normal range of motion.      Cervical back: Normal range of motion.   Skin:     General: Skin is warm and dry.   Psychiatric:         Behavior: Behavior normal.          Assessment / Plan      Assessment & Plan:  Diagnoses and all orders for this visit:    1. Uncontrolled type 2 diabetes mellitus with hyperglycemia (Primary)  Assessment & Plan:  Diabetes is worsening.   Medication changes per orders.  Reminded to bring in blood sugar diary at next visit.  Recommended an ADA diet.  Discussed ways to avoid symptomatic hypoglycemia.  Reminded to get yearly retinal exam.    Review of CGM data shows days with persistent hyperglycemia and appearance of no insulin being administered.  Patient is anxious about hypoglycemia so she is skipping one of her doses of Tresiba most days.  Will have her switch to taking Tresiba in 1 dose of 22 units in the morning.      She also needs to make sure she is taking her Humalog before meals instead of at bedtime.  Will have her take Humalog 10 units before breakfast and before dinner.  Continue to monitor closely with aleks.  Can make further adjustments in her insulin doses when we are sure that she is taking it routinely.    Gave patient order for her to get fasting labs before next appointment.    Diabetes will be reassessed in 3 months    Orders:  -     " POC Glucose, Blood  -     POC Glycosylated Hemoglobin (Hb A1C)  -     Cancel: Comprehensive Metabolic Panel; Future  -     Cancel: Lipid Panel; Future  -     Comprehensive Metabolic Panel; Future  -     Lipid Panel; Future    2. Acquired hypothyroidism  Assessment & Plan:  Clinically euthyroid on levothyroxine 150 mcg daily.  Gave patient order to have thyroid levels checked with fasting labs before next appointment.  Further recommendations on dose and recheck of labs based on results.    Orders:  -     Cancel: T4, Free; Future  -     Cancel: TSH; Future  -     T4, Free; Future  -     TSH; Future    Other orders  -     insulin degludec (Tresiba FlexTouch) 100 UNIT/ML solution pen-injector injection; Inject 22 Units under the skin into the appropriate area as directed Every Morning.  Dispense: 15 mL; Refill: 3  -     insulin aspart (NovoLOG FlexPen) 100 UNIT/ML solution pen-injector sc pen; INJECT 10 UNITS WITH B'FAST AND LUNCH AND 10 UNITS BEFORE SUPPER  Dispense: 45 mL; Refill: 3        Return for Next scheduled follow up.    Portions of this note were completed with voice recognition program.  Electronically signed by Casandra Pineda PA-C  [facility] [place]  08/27/2024

## 2024-08-28 NOTE — ASSESSMENT & PLAN NOTE
Clinically euthyroid on levothyroxine 150 mcg daily.  Gave patient order to have thyroid levels checked with fasting labs before next appointment.  Further recommendations on dose and recheck of labs based on results.

## 2024-08-28 NOTE — ASSESSMENT & PLAN NOTE
Diabetes is worsening.   Medication changes per orders.  Reminded to bring in blood sugar diary at next visit.  Recommended an ADA diet.  Discussed ways to avoid symptomatic hypoglycemia.  Reminded to get yearly retinal exam.    Review of CGM data shows days with persistent hyperglycemia and appearance of no insulin being administered.  Patient is anxious about hypoglycemia so she is skipping one of her doses of Tresiba most days.  Will have her switch to taking Tresiba in 1 dose of 22 units in the morning.      She also needs to make sure she is taking her Humalog before meals instead of at bedtime.  Will have her take Humalog 10 units before breakfast and before dinner.  Continue to monitor closely with aleks.  Can make further adjustments in her insulin doses when we are sure that she is taking it routinely.    Gave patient order for her to get fasting labs before next appointment.    Diabetes will be reassessed in 3 months

## 2025-02-10 ENCOUNTER — TRANSCRIBE ORDERS (OUTPATIENT)
Dept: ADMINISTRATIVE | Facility: HOSPITAL | Age: 80
End: 2025-02-10
Payer: MEDICARE

## 2025-02-10 DIAGNOSIS — Z99.2 CHRONIC KIDNEY DISEASE REQUIRING CHRONIC DIALYSIS: Primary | ICD-10-CM

## 2025-02-10 DIAGNOSIS — N18.6 CHRONIC KIDNEY DISEASE REQUIRING CHRONIC DIALYSIS: Primary | ICD-10-CM

## (undated) DEVICE — PK NEURO DISC 10

## (undated) DEVICE — IRRIGATOR BULB ASEPTO 60CC STRL

## (undated) DEVICE — KT DRN EVAC WND PVC PCH WTROC RND 10F400

## (undated) DEVICE — ELECTRD BLD EXT EDGE/INSUL 1P 4IN

## (undated) DEVICE — DRSNG WND GZ PAD BORDERED 4X8IN STRL

## (undated) DEVICE — CANNULA,ADULT,SOFT-TOUCH,7TUBE,SC: Brand: MEDLINE

## (undated) DEVICE — GOWN,REINF,POLY,ECL,PP SLV,XXL: Brand: MEDLINE

## (undated) DEVICE — ADAPT ST INFUS ADMIN SYR 70IN

## (undated) DEVICE — ENCORE® LATEX MICRO SIZE 7, STERILE LATEX POWDER-FREE SURGICAL GLOVE: Brand: ENCORE

## (undated) DEVICE — MEDI-VAC YANKAUER SUCTION HANDLE W/BULBOUS TIP: Brand: CARDINAL HEALTH

## (undated) DEVICE — 3M™ STERI-DRAPE™ INSTRUMENT POUCH 1018: Brand: STERI-DRAPE™

## (undated) DEVICE — ADHS LIQ MASTISOL 2/3ML

## (undated) DEVICE — ST PRIM GRVTY NDLESS 3 INJ PORT 105IN

## (undated) DEVICE — ENCORE® LATEX MICRO SIZE 6.5, STERILE LATEX POWDER-FREE SURGICAL GLOVE: Brand: ENCORE

## (undated) DEVICE — 3M™ STERI-STRIP™ REINFORCED ADHESIVE SKIN CLOSURES, R1547, 1/2 IN X 4 IN (12 MM X 100 MM), 6 STRIPS/ENVELOPE: Brand: 3M™ STERI-STRIP™

## (undated) DEVICE — SOL LR 1000ML

## (undated) DEVICE — SUT VIC PLS CTD ANTIB BR 3/0 8/18IN 45CM

## (undated) DEVICE — ACCY PA700 LUBRICANT DIFFUSER MR7 4 PACK: Brand: MIDAS REX

## (undated) DEVICE — SUT ETHLN 3/0 FS1 30IN 669H

## (undated) DEVICE — TOOL 14MH30 LEGEND 14CM 3MM: Brand: MIDAS REX ™

## (undated) DEVICE — JACKSON TABLE POSITIONER KIT: Brand: MEDLINE INDUSTRIES, INC.

## (undated) DEVICE — SUT VIC 0 CTD BR 18IN UNDYE VCP724D

## (undated) DEVICE — DRAPE,TOP,102X53,STERILE: Brand: MEDLINE

## (undated) DEVICE — DISPOSABLE IRRIGATION BIPOLAR CORD, M1000 TYPE: Brand: KIRWAN

## (undated) DEVICE — DRSNG WND BORDR/ADHS NONADHR/GZ LF 4X4IN STRL

## (undated) DEVICE — COVADERM PLUS: Brand: DEROYAL

## (undated) DEVICE — TOOL 14MH30D LEGEND 14CM 3MM MH DIAM: Brand: MIDAS REX ™

## (undated) DEVICE — 3M™ STERI-STRIP™ ANTIMICROBIAL SKIN CLOSURES 1/2 INCH X 4 INCHES 50/CARTON 4 CARTONS/CASE A1847: Brand: 3M™ STERI-STRIP™

## (undated) DEVICE — TRAP,MUCUS SPECIMEN,40CC: Brand: MEDLINE

## (undated) DEVICE — SNAP KOVER: Brand: UNBRANDED

## (undated) DEVICE — AIRWY 90MM NO9

## (undated) DEVICE — C-ARM DRAPE: Brand: DEROYAL

## (undated) DEVICE — SPHR MARKR STEALTH STATION

## (undated) DEVICE — ANTIBACTERIAL UNDYED BRAIDED (POLYGLACTIN 910), SYNTHETIC ABSORBABLE SUTURE: Brand: COATED VICRYL

## (undated) DEVICE — ENCORE® LATEX MICRO SIZE 7.5, STERILE LATEX POWDER-FREE SURGICAL GLOVE: Brand: ENCORE

## (undated) DEVICE — SHEET,DRAPE,40X58,STERILE: Brand: MEDLINE

## (undated) DEVICE — MEDI-VAC NON-CONDUCTIVE SUCTION TUBING: Brand: CARDINAL HEALTH

## (undated) DEVICE — PACK,UNIVERSAL,NO GOWNS: Brand: MEDLINE

## (undated) DEVICE — FLOSEAL MATRIX IS INDICATED IN SURGICAL PROCEDURES (OTHER THAN IN OPHTHALMIC) AS AN ADJUNCT TO HEMOSTASIS WHEN CONTROL OF BLEEDING BY LIGATURE OR CONVENTIONALPROCEDURES IS INEFFECTIVE OR IMPRACTICAL.: Brand: FLOSEAL HEMOSTATIC MATRIX